# Patient Record
Sex: FEMALE | Race: WHITE
[De-identification: names, ages, dates, MRNs, and addresses within clinical notes are randomized per-mention and may not be internally consistent; named-entity substitution may affect disease eponyms.]

---

## 2017-10-06 ENCOUNTER — HOSPITAL ENCOUNTER (OUTPATIENT)
Dept: HOSPITAL 75 - LAB | Age: 57
LOS: 90 days | Discharge: HOME | End: 2018-01-04
Attending: INTERNAL MEDICINE
Payer: COMMERCIAL

## 2017-10-06 DIAGNOSIS — M34.9: Primary | ICD-10-CM

## 2017-10-06 LAB
ALBUMIN SERPL-MCNC: 4.1 GM/DL (ref 3.2–4.5)
ALP SERPL-CCNC: 140 U/L (ref 40–136)
ALT SERPL-CCNC: 100 U/L (ref 0–55)
BASOPHILS # BLD AUTO: 0 10^3/UL (ref 0–0.1)
BASOPHILS NFR BLD AUTO: 1 % (ref 0–10)
BILIRUB SERPL-MCNC: 0.5 MG/DL (ref 0.1–1)
BUN/CREAT SERPL: 14
CALCIUM SERPL-MCNC: 9.9 MG/DL (ref 8.5–10.1)
CHLORIDE SERPL-SCNC: 108 MMOL/L (ref 98–107)
CO2 SERPL-SCNC: 23 MMOL/L (ref 21–32)
CREAT SERPL-MCNC: 0.65 MG/DL (ref 0.6–1.3)
EOSINOPHIL # BLD AUTO: 0.1 10^3/UL (ref 0–0.3)
EOSINOPHIL NFR BLD AUTO: 1 % (ref 0–10)
ERYTHROCYTE [DISTWIDTH] IN BLOOD BY AUTOMATED COUNT: 13.8 % (ref 10–14.5)
GFR SERPLBLD BASED ON 1.73 SQ M-ARVRAT: > 60 ML/MIN
GLUCOSE SERPL-MCNC: 106 MG/DL (ref 70–105)
HCT VFR BLD CALC: 39 % (ref 35–52)
HGB BLD-MCNC: 12.5 G/DL (ref 11.5–16)
LYMPHOCYTES # BLD AUTO: 1.6 X 10^3 (ref 1–4)
LYMPHOCYTES NFR BLD AUTO: 36 % (ref 12–44)
MANUAL DIFFERENTIAL PERFORMED BLD QL: NO
MCH RBC QN AUTO: 29 PG (ref 25–34)
MCHC RBC AUTO-ENTMCNC: 32 G/DL (ref 32–36)
MCV RBC AUTO: 92 FL (ref 80–99)
MONOCYTES # BLD AUTO: 0.5 X 10^3 (ref 0–1)
MONOCYTES NFR BLD AUTO: 12 % (ref 0–12)
NEUTROPHILS # BLD AUTO: 2.3 X 10^3 (ref 1.8–7.8)
NEUTROPHILS NFR BLD AUTO: 51 % (ref 42–75)
PLATELET # BLD: 426 10^3/UL (ref 130–400)
PMV BLD AUTO: 8.9 FL (ref 7.4–10.4)
POTASSIUM SERPL-SCNC: 3.9 MMOL/L (ref 3.6–5)
PROT SERPL-MCNC: 8.6 GM/DL (ref 6.4–8.2)
RBC # BLD AUTO: 4.29 10^6/UL (ref 4.35–5.85)
SODIUM SERPL-SCNC: 138 MMOL/L (ref 135–145)
WBC # BLD AUTO: 4.6 10^3/UL (ref 4.3–11)

## 2017-10-06 PROCEDURE — 36415 COLL VENOUS BLD VENIPUNCTURE: CPT

## 2017-10-06 PROCEDURE — 85025 COMPLETE CBC W/AUTO DIFF WBC: CPT

## 2017-10-06 PROCEDURE — 80053 COMPREHEN METABOLIC PANEL: CPT

## 2018-02-19 ENCOUNTER — HOSPITAL ENCOUNTER (OUTPATIENT)
Dept: HOSPITAL 75 - WOUNDCARE | Age: 58
End: 2018-02-19
Attending: SURGERY
Payer: COMMERCIAL

## 2018-02-19 DIAGNOSIS — L97.312: ICD-10-CM

## 2018-02-19 DIAGNOSIS — L97.322: ICD-10-CM

## 2018-02-19 DIAGNOSIS — I70.244: ICD-10-CM

## 2018-02-19 DIAGNOSIS — M34.9: ICD-10-CM

## 2018-02-19 DIAGNOSIS — L97.422: ICD-10-CM

## 2018-02-19 DIAGNOSIS — I70.234: Primary | ICD-10-CM

## 2018-02-19 DIAGNOSIS — L97.412: ICD-10-CM

## 2018-02-19 PROCEDURE — 99215 OFFICE O/P EST HI 40 MIN: CPT

## 2018-02-28 ENCOUNTER — HOSPITAL ENCOUNTER (OUTPATIENT)
Dept: HOSPITAL 75 - WOUNDCARE | Age: 58
End: 2018-02-28
Attending: SURGERY
Payer: COMMERCIAL

## 2018-02-28 DIAGNOSIS — L97.412: ICD-10-CM

## 2018-02-28 DIAGNOSIS — L97.322: ICD-10-CM

## 2018-02-28 DIAGNOSIS — I70.244: ICD-10-CM

## 2018-02-28 DIAGNOSIS — L97.312: ICD-10-CM

## 2018-02-28 DIAGNOSIS — M34.9: ICD-10-CM

## 2018-02-28 DIAGNOSIS — I70.234: Primary | ICD-10-CM

## 2018-02-28 DIAGNOSIS — L97.422: ICD-10-CM

## 2018-02-28 PROCEDURE — 11042 DBRDMT SUBQ TIS 1ST 20SQCM/<: CPT

## 2018-02-28 PROCEDURE — 11045 DBRDMT SUBQ TISS EACH ADDL: CPT

## 2018-03-12 ENCOUNTER — HOSPITAL ENCOUNTER (OUTPATIENT)
Dept: HOSPITAL 75 - WOUNDCARE | Age: 58
End: 2018-03-12
Attending: SURGERY
Payer: COMMERCIAL

## 2018-03-12 DIAGNOSIS — L97.322: ICD-10-CM

## 2018-03-12 DIAGNOSIS — I87.333: ICD-10-CM

## 2018-03-12 DIAGNOSIS — I70.244: ICD-10-CM

## 2018-03-12 DIAGNOSIS — I70.231: Primary | ICD-10-CM

## 2018-03-12 DIAGNOSIS — M34.9: ICD-10-CM

## 2018-03-12 DIAGNOSIS — L97.312: ICD-10-CM

## 2018-03-12 PROCEDURE — 99214 OFFICE O/P EST MOD 30 MIN: CPT

## 2018-04-04 ENCOUNTER — HOSPITAL ENCOUNTER (OUTPATIENT)
Dept: HOSPITAL 75 - WOUNDCARE | Age: 58
End: 2018-04-04
Attending: SURGERY
Payer: COMMERCIAL

## 2018-04-04 ENCOUNTER — HOSPITAL ENCOUNTER (OUTPATIENT)
Dept: HOSPITAL 75 - LAB | Age: 58
End: 2018-04-04
Attending: SURGERY
Payer: COMMERCIAL

## 2018-04-04 DIAGNOSIS — I70.234: Primary | ICD-10-CM

## 2018-04-04 DIAGNOSIS — L97.312: ICD-10-CM

## 2018-04-04 DIAGNOSIS — M34.9: ICD-10-CM

## 2018-04-04 DIAGNOSIS — L97.322: ICD-10-CM

## 2018-04-04 DIAGNOSIS — I70.244: ICD-10-CM

## 2018-04-04 DIAGNOSIS — I87.333: ICD-10-CM

## 2018-04-04 LAB
ALBUMIN SERPL-MCNC: 4 GM/DL (ref 3.2–4.5)
ALP SERPL-CCNC: 98 U/L (ref 40–136)
ALT SERPL-CCNC: 72 U/L (ref 0–55)
BASOPHILS # BLD AUTO: 0 10^3/UL (ref 0–0.1)
BASOPHILS NFR BLD AUTO: 0 % (ref 0–10)
BILIRUB SERPL-MCNC: 0.4 MG/DL (ref 0.1–1)
BUN/CREAT SERPL: 12
CALCIUM SERPL-MCNC: 9.3 MG/DL (ref 8.5–10.1)
CHLORIDE SERPL-SCNC: 109 MMOL/L (ref 98–107)
CO2 SERPL-SCNC: 25 MMOL/L (ref 21–32)
CREAT SERPL-MCNC: 0.57 MG/DL (ref 0.6–1.3)
EOSINOPHIL # BLD AUTO: 0.1 10^3/UL (ref 0–0.3)
EOSINOPHIL NFR BLD AUTO: 3 % (ref 0–10)
ERYTHROCYTE [DISTWIDTH] IN BLOOD BY AUTOMATED COUNT: 13.6 % (ref 10–14.5)
GFR SERPLBLD BASED ON 1.73 SQ M-ARVRAT: > 60 ML/MIN
GLUCOSE SERPL-MCNC: 98 MG/DL (ref 70–105)
HCT VFR BLD CALC: 36 % (ref 35–52)
HGB BLD-MCNC: 11.9 G/DL (ref 11.5–16)
LYMPHOCYTES # BLD AUTO: 1.4 X 10^3 (ref 1–4)
LYMPHOCYTES NFR BLD AUTO: 25 % (ref 12–44)
MANUAL DIFFERENTIAL PERFORMED BLD QL: NO
MCH RBC QN AUTO: 31 PG (ref 25–34)
MCHC RBC AUTO-ENTMCNC: 33 G/DL (ref 32–36)
MCV RBC AUTO: 92 FL (ref 80–99)
MONOCYTES # BLD AUTO: 0.6 X 10^3 (ref 0–1)
MONOCYTES NFR BLD AUTO: 10 % (ref 0–12)
NEUTROPHILS # BLD AUTO: 3.6 X 10^3 (ref 1.8–7.8)
NEUTROPHILS NFR BLD AUTO: 62 % (ref 42–75)
PLATELET # BLD: 316 10^3/UL (ref 130–400)
PMV BLD AUTO: 9.1 FL (ref 7.4–10.4)
POTASSIUM SERPL-SCNC: 3.8 MMOL/L (ref 3.6–5)
PROT SERPL-MCNC: 8 GM/DL (ref 6.4–8.2)
RBC # BLD AUTO: 3.9 10^6/UL (ref 4.35–5.85)
SODIUM SERPL-SCNC: 140 MMOL/L (ref 135–145)
WBC # BLD AUTO: 5.7 10^3/UL (ref 4.3–11)

## 2018-04-04 PROCEDURE — 36415 COLL VENOUS BLD VENIPUNCTURE: CPT

## 2018-04-04 PROCEDURE — 80053 COMPREHEN METABOLIC PANEL: CPT

## 2018-04-04 PROCEDURE — 11045 DBRDMT SUBQ TISS EACH ADDL: CPT

## 2018-04-04 PROCEDURE — 85025 COMPLETE CBC W/AUTO DIFF WBC: CPT

## 2018-04-04 PROCEDURE — 11042 DBRDMT SUBQ TIS 1ST 20SQCM/<: CPT

## 2018-06-18 ENCOUNTER — HOSPITAL ENCOUNTER (OUTPATIENT)
Dept: HOSPITAL 75 - LAB | Age: 58
End: 2018-06-18
Attending: INTERNAL MEDICINE
Payer: COMMERCIAL

## 2018-06-18 DIAGNOSIS — R94.5: Primary | ICD-10-CM

## 2018-06-18 PROCEDURE — 82550 ASSAY OF CK (CPK): CPT

## 2018-06-18 PROCEDURE — 82085 ASSAY OF ALDOLASE: CPT

## 2018-06-18 PROCEDURE — 36415 COLL VENOUS BLD VENIPUNCTURE: CPT

## 2018-06-18 PROCEDURE — 82552 ASSAY OF CPK IN BLOOD: CPT

## 2018-09-10 ENCOUNTER — HOSPITAL ENCOUNTER (OUTPATIENT)
Dept: HOSPITAL 75 - CARD | Age: 58
End: 2018-09-10
Attending: FAMILY MEDICINE
Payer: COMMERCIAL

## 2018-09-10 DIAGNOSIS — M34.9: Primary | ICD-10-CM

## 2018-09-10 PROCEDURE — 93306 TTE W/DOPPLER COMPLETE: CPT

## 2018-09-10 PROCEDURE — 94060 EVALUATION OF WHEEZING: CPT

## 2018-09-10 PROCEDURE — 94726 PLETHYSMOGRAPHY LUNG VOLUMES: CPT

## 2018-09-10 PROCEDURE — 94729 DIFFUSING CAPACITY: CPT

## 2018-11-27 ENCOUNTER — HOSPITAL ENCOUNTER (OUTPATIENT)
Dept: HOSPITAL 75 - RAD | Age: 58
End: 2018-11-27
Attending: NURSE PRACTITIONER
Payer: COMMERCIAL

## 2018-11-27 DIAGNOSIS — J98.4: Primary | ICD-10-CM

## 2018-11-27 DIAGNOSIS — R94.2: ICD-10-CM

## 2018-11-27 DIAGNOSIS — M34.9: ICD-10-CM

## 2018-11-27 DIAGNOSIS — J98.11: ICD-10-CM

## 2018-11-27 LAB
ALBUMIN SERPL-MCNC: 3.8 GM/DL (ref 3.2–4.5)
ALP SERPL-CCNC: 118 U/L (ref 40–136)
ALT SERPL-CCNC: 36 U/L (ref 0–55)
BASOPHILS # BLD AUTO: 0 10^3/UL (ref 0–0.1)
BASOPHILS NFR BLD AUTO: 1 % (ref 0–10)
BILIRUB SERPL-MCNC: 0.3 MG/DL (ref 0.1–1)
BUN/CREAT SERPL: 21
CALCIUM SERPL-MCNC: 9.7 MG/DL (ref 8.5–10.1)
CHLORIDE SERPL-SCNC: 109 MMOL/L (ref 98–107)
CO2 SERPL-SCNC: 20 MMOL/L (ref 21–32)
CREAT SERPL-MCNC: 0.58 MG/DL (ref 0.6–1.3)
EOSINOPHIL # BLD AUTO: 0.3 10^3/UL (ref 0–0.3)
EOSINOPHIL NFR BLD AUTO: 6 % (ref 0–10)
ERYTHROCYTE [DISTWIDTH] IN BLOOD BY AUTOMATED COUNT: 15.3 % (ref 10–14.5)
GFR SERPLBLD BASED ON 1.73 SQ M-ARVRAT: > 60 ML/MIN
GLUCOSE SERPL-MCNC: 97 MG/DL (ref 70–105)
HCT VFR BLD CALC: 34 % (ref 35–52)
HGB BLD-MCNC: 10.6 G/DL (ref 11.5–16)
LYMPHOCYTES # BLD AUTO: 1.4 X 10^3 (ref 1–4)
LYMPHOCYTES NFR BLD AUTO: 31 % (ref 12–44)
MANUAL DIFFERENTIAL PERFORMED BLD QL: NO
MCH RBC QN AUTO: 28 PG (ref 25–34)
MCHC RBC AUTO-ENTMCNC: 31 G/DL (ref 32–36)
MCV RBC AUTO: 92 FL (ref 80–99)
MONOCYTES # BLD AUTO: 0.4 X 10^3 (ref 0–1)
MONOCYTES NFR BLD AUTO: 10 % (ref 0–12)
NEUTROPHILS # BLD AUTO: 2.3 X 10^3 (ref 1.8–7.8)
NEUTROPHILS NFR BLD AUTO: 52 % (ref 42–75)
PLATELET # BLD: 415 10^3/UL (ref 130–400)
PMV BLD AUTO: 8.9 FL (ref 7.4–10.4)
POTASSIUM SERPL-SCNC: 3.8 MMOL/L (ref 3.6–5)
PROT SERPL-MCNC: 8.2 GM/DL (ref 6.4–8.2)
SODIUM SERPL-SCNC: 139 MMOL/L (ref 135–145)
WBC # BLD AUTO: 4.3 10^3/UL (ref 4.3–11)

## 2018-11-27 PROCEDURE — 71275 CT ANGIOGRAPHY CHEST: CPT

## 2018-12-14 ENCOUNTER — HOSPITAL ENCOUNTER (OUTPATIENT)
Dept: HOSPITAL 75 - RAD | Age: 58
End: 2018-12-14
Attending: INTERNAL MEDICINE
Payer: COMMERCIAL

## 2018-12-14 DIAGNOSIS — M19.031: ICD-10-CM

## 2018-12-14 DIAGNOSIS — M19.032: ICD-10-CM

## 2018-12-14 DIAGNOSIS — M34.9: Primary | ICD-10-CM

## 2018-12-14 PROCEDURE — 77075 RADEX OSSEOUS SURVEY COMPL: CPT

## 2018-12-14 NOTE — DIAGNOSTIC IMAGING REPORT
EXAMINATION: Bone survey.



INDICATION: Scleroderma.



Multiple images of the axial and appendicular skeleton were

obtained.



FINDINGS: There is no fracture or acute bony abnormality

identified. There is no lytic or blastic lesion identified

either. However, there is severe degenerative disease involving

both wrists and carpal bones. The right wrist is the more

severely affected.



The soft tissues are unremarkable.



IMPRESSION:

1. There is no evidence for an acute bony abnormality. There is

no lytic or blastic lesion identified either.

2. There is severe degenerative disease involving the wrist

joints and the carpal bones.



Dictated by: 



  Dictated on workstation # [id number]

## 2019-01-10 LAB
BASOPHILS # BLD AUTO: 0 10^3/UL (ref 0–0.1)
BASOPHILS NFR BLD AUTO: 1 % (ref 0–10)
BASOPHILS NFR BLD MANUAL: 0 %
EOSINOPHIL # BLD AUTO: 0.2 10^3/UL (ref 0–0.3)
EOSINOPHIL NFR BLD AUTO: 5 % (ref 0–10)
EOSINOPHIL NFR BLD MANUAL: 5 %
ERYTHROCYTE [DISTWIDTH] IN BLOOD BY AUTOMATED COUNT: 14.4 % (ref 10–14.5)
HCT VFR BLD CALC: 39 % (ref 35–52)
HGB BLD-MCNC: 12.4 G/DL (ref 11.5–16)
LYMPHOCYTES # BLD AUTO: 1.3 X 10^3 (ref 1–4)
LYMPHOCYTES NFR BLD AUTO: 34 % (ref 12–44)
MCH RBC QN AUTO: 29 PG (ref 25–34)
MCHC RBC AUTO-ENTMCNC: 32 G/DL (ref 32–36)
MCV RBC AUTO: 90 FL (ref 80–99)
MONOCYTES # BLD AUTO: 0.4 X 10^3 (ref 0–1)
MONOCYTES NFR BLD AUTO: 11 % (ref 0–12)
MONOCYTES NFR BLD: 12 %
NEUTROPHILS # BLD AUTO: 2 X 10^3 (ref 1.8–7.8)
NEUTROPHILS NFR BLD AUTO: 50 % (ref 42–75)
NEUTS BAND NFR BLD MANUAL: 46 %
NEUTS BAND NFR BLD: 5 %
PLATELET # BLD: 413 10^3/UL (ref 130–400)
PMV BLD AUTO: 9 FL (ref 7.4–10.4)
RBC MORPH BLD: NORMAL
RETICS #: 39 10E9/L (ref 24–90)
RETICS/RBC NFR: 0.89 % (ref 0.5–2.4)
VARIANT LYMPHS NFR BLD MANUAL: 32 %
WBC # BLD AUTO: 3.9 10^3/UL (ref 4.3–11)

## 2019-01-24 ENCOUNTER — HOSPITAL ENCOUNTER (OUTPATIENT)
Dept: HOSPITAL 75 - ONC | Age: 59
LOS: 32 days | Discharge: HOME | End: 2019-02-25
Attending: INTERNAL MEDICINE
Payer: COMMERCIAL

## 2019-01-24 DIAGNOSIS — M34.9: ICD-10-CM

## 2019-01-24 DIAGNOSIS — J98.4: ICD-10-CM

## 2019-01-24 DIAGNOSIS — D47.2: Primary | ICD-10-CM

## 2019-01-24 DIAGNOSIS — I10: ICD-10-CM

## 2019-01-24 DIAGNOSIS — D64.9: ICD-10-CM

## 2019-01-24 DIAGNOSIS — Z79.899: ICD-10-CM

## 2019-01-24 PROCEDURE — 85045 AUTOMATED RETICULOCYTE COUNT: CPT

## 2019-01-24 PROCEDURE — 84155 ASSAY OF PROTEIN SERUM: CPT

## 2019-01-24 PROCEDURE — 82570 ASSAY OF URINE CREATININE: CPT

## 2019-01-24 PROCEDURE — 82746 ASSAY OF FOLIC ACID SERUM: CPT

## 2019-01-24 PROCEDURE — 82728 ASSAY OF FERRITIN: CPT

## 2019-01-24 PROCEDURE — 99213 OFFICE O/P EST LOW 20 MIN: CPT

## 2019-01-24 PROCEDURE — 86335 IMMUNFIX E-PHORSIS/URINE/CSF: CPT

## 2019-01-24 PROCEDURE — 82232 ASSAY OF BETA-2 PROTEIN: CPT

## 2019-01-24 PROCEDURE — 88237 TISSUE CULTURE BONE MARROW: CPT

## 2019-01-24 PROCEDURE — 36415 COLL VENOUS BLD VENIPUNCTURE: CPT

## 2019-01-24 PROCEDURE — 80053 COMPREHEN METABOLIC PANEL: CPT

## 2019-01-24 PROCEDURE — 85007 BL SMEAR W/DIFF WBC COUNT: CPT

## 2019-01-24 PROCEDURE — 88264 CHROMOSOME ANALYSIS 20-25: CPT

## 2019-01-24 PROCEDURE — 84156 ASSAY OF PROTEIN URINE: CPT

## 2019-01-24 PROCEDURE — 38221 DX BONE MARROW BIOPSIES: CPT

## 2019-01-24 PROCEDURE — 86334 IMMUNOFIX E-PHORESIS SERUM: CPT

## 2019-01-24 PROCEDURE — 82784 ASSAY IGA/IGD/IGG/IGM EACH: CPT

## 2019-01-24 PROCEDURE — 85025 COMPLETE CBC W/AUTO DIFF WBC: CPT

## 2019-01-24 PROCEDURE — 83540 ASSAY OF IRON: CPT

## 2019-01-24 PROCEDURE — 84165 PROTEIN E-PHORESIS SERUM: CPT

## 2019-01-24 PROCEDURE — 83883 ASSAY NEPHELOMETRY NOT SPEC: CPT

## 2019-01-24 PROCEDURE — 85027 COMPLETE CBC AUTOMATED: CPT

## 2019-01-24 PROCEDURE — 84166 PROTEIN E-PHORESIS/URINE/CSF: CPT

## 2019-01-24 PROCEDURE — 82607 VITAMIN B-12: CPT

## 2019-03-12 ENCOUNTER — HOSPITAL ENCOUNTER (OUTPATIENT)
Dept: HOSPITAL 75 - LAB | Age: 59
End: 2019-03-12
Attending: INTERNAL MEDICINE
Payer: COMMERCIAL

## 2019-03-12 DIAGNOSIS — Z79.899: ICD-10-CM

## 2019-03-12 DIAGNOSIS — M34.9: Primary | ICD-10-CM

## 2019-03-12 LAB
ALBUMIN SERPL-MCNC: 3.9 GM/DL (ref 3.2–4.5)
ALP SERPL-CCNC: 104 U/L (ref 40–136)
ALT SERPL-CCNC: 32 U/L (ref 0–55)
BASOPHILS # BLD AUTO: 0 10^3/UL (ref 0–0.1)
BASOPHILS NFR BLD AUTO: 1 % (ref 0–10)
BILIRUB SERPL-MCNC: 0.5 MG/DL (ref 0.1–1)
BUN/CREAT SERPL: 15
CALCIUM SERPL-MCNC: 9.6 MG/DL (ref 8.5–10.1)
CHLORIDE SERPL-SCNC: 106 MMOL/L (ref 98–107)
CO2 SERPL-SCNC: 24 MMOL/L (ref 21–32)
CREAT SERPL-MCNC: 0.6 MG/DL (ref 0.6–1.3)
EOSINOPHIL # BLD AUTO: 0.1 10^3/UL (ref 0–0.3)
EOSINOPHIL NFR BLD AUTO: 3 % (ref 0–10)
ERYTHROCYTE [DISTWIDTH] IN BLOOD BY AUTOMATED COUNT: 14.5 % (ref 10–14.5)
ERYTHROCYTE [SEDIMENTATION RATE] IN BLOOD: 56 MM/HR (ref 0–30)
GFR SERPLBLD BASED ON 1.73 SQ M-ARVRAT: > 60 ML/MIN
GLUCOSE SERPL-MCNC: 94 MG/DL (ref 70–105)
HCT VFR BLD CALC: 37 % (ref 35–52)
HGB BLD-MCNC: 12.1 G/DL (ref 11.5–16)
LYMPHOCYTES # BLD AUTO: 1.6 X 10^3 (ref 1–4)
LYMPHOCYTES NFR BLD AUTO: 42 % (ref 12–44)
MANUAL DIFFERENTIAL PERFORMED BLD QL: NO
MCH RBC QN AUTO: 29 PG (ref 25–34)
MCHC RBC AUTO-ENTMCNC: 33 G/DL (ref 32–36)
MCV RBC AUTO: 90 FL (ref 80–99)
MONOCYTES # BLD AUTO: 0.3 X 10^3 (ref 0–1)
MONOCYTES NFR BLD AUTO: 9 % (ref 0–12)
NEUTROPHILS # BLD AUTO: 1.8 X 10^3 (ref 1.8–7.8)
NEUTROPHILS NFR BLD AUTO: 46 % (ref 42–75)
PLATELET # BLD: 349 10^3/UL (ref 130–400)
PMV BLD AUTO: 8.8 FL (ref 7.4–10.4)
POTASSIUM SERPL-SCNC: 3.8 MMOL/L (ref 3.6–5)
PROT SERPL-MCNC: 8 GM/DL (ref 6.4–8.2)
SODIUM SERPL-SCNC: 139 MMOL/L (ref 135–145)
WBC # BLD AUTO: 3.9 10^3/UL (ref 4.3–11)

## 2019-03-12 PROCEDURE — 85652 RBC SED RATE AUTOMATED: CPT

## 2019-03-12 PROCEDURE — 86141 C-REACTIVE PROTEIN HS: CPT

## 2019-03-12 PROCEDURE — 80053 COMPREHEN METABOLIC PANEL: CPT

## 2019-03-12 PROCEDURE — 85025 COMPLETE CBC W/AUTO DIFF WBC: CPT

## 2019-03-12 PROCEDURE — 36415 COLL VENOUS BLD VENIPUNCTURE: CPT

## 2019-04-08 LAB
ALBUMIN SERPL-MCNC: 3.9 GM/DL (ref 3.2–4.5)
ALP SERPL-CCNC: 103 U/L (ref 40–136)
ALT SERPL-CCNC: 30 U/L (ref 0–55)
BASOPHILS # BLD AUTO: 0 10^3/UL (ref 0–0.1)
BASOPHILS NFR BLD AUTO: 1 % (ref 0–10)
BILIRUB SERPL-MCNC: 0.5 MG/DL (ref 0.1–1)
BUN/CREAT SERPL: 16
CALCIUM SERPL-MCNC: 9.7 MG/DL (ref 8.5–10.1)
CHLORIDE SERPL-SCNC: 107 MMOL/L (ref 98–107)
CO2 SERPL-SCNC: 23 MMOL/L (ref 21–32)
CREAT SERPL-MCNC: 0.63 MG/DL (ref 0.6–1.3)
EOSINOPHIL # BLD AUTO: 0.1 10^3/UL (ref 0–0.3)
EOSINOPHIL NFR BLD AUTO: 3 % (ref 0–10)
ERYTHROCYTE [DISTWIDTH] IN BLOOD BY AUTOMATED COUNT: 14.5 % (ref 10–14.5)
GFR SERPLBLD BASED ON 1.73 SQ M-ARVRAT: > 60 ML/MIN
GLUCOSE SERPL-MCNC: 92 MG/DL (ref 70–105)
HCT VFR BLD CALC: 37 % (ref 35–52)
HGB BLD-MCNC: 12.3 G/DL (ref 11.5–16)
LYMPHOCYTES # BLD AUTO: 1.5 X 10^3 (ref 1–4)
LYMPHOCYTES NFR BLD AUTO: 40 % (ref 12–44)
MANUAL DIFFERENTIAL PERFORMED BLD QL: NO
MCH RBC QN AUTO: 30 PG (ref 25–34)
MCHC RBC AUTO-ENTMCNC: 33 G/DL (ref 32–36)
MCV RBC AUTO: 92 FL (ref 80–99)
MONOCYTES # BLD AUTO: 0.4 X 10^3 (ref 0–1)
MONOCYTES NFR BLD AUTO: 11 % (ref 0–12)
NEUTROPHILS # BLD AUTO: 1.7 X 10^3 (ref 1.8–7.8)
NEUTROPHILS NFR BLD AUTO: 46 % (ref 42–75)
PLATELET # BLD: 329 10^3/UL (ref 130–400)
PMV BLD AUTO: 9.4 FL (ref 7.4–10.4)
POTASSIUM SERPL-SCNC: 4.2 MMOL/L (ref 3.6–5)
PROT SERPL-MCNC: 7.7 GM/DL (ref 6.4–8.2)
SODIUM SERPL-SCNC: 137 MMOL/L (ref 135–145)
WBC # BLD AUTO: 3.8 10^3/UL (ref 4.3–11)

## 2019-04-25 ENCOUNTER — HOSPITAL ENCOUNTER (OUTPATIENT)
Dept: HOSPITAL 75 - ONC | Age: 59
LOS: 73 days | Discharge: HOME | End: 2019-07-07
Attending: INTERNAL MEDICINE
Payer: COMMERCIAL

## 2019-04-25 DIAGNOSIS — D64.9: ICD-10-CM

## 2019-04-25 DIAGNOSIS — D47.2: Primary | ICD-10-CM

## 2019-04-25 DIAGNOSIS — Z79.899: ICD-10-CM

## 2019-04-25 DIAGNOSIS — I10: ICD-10-CM

## 2019-04-25 DIAGNOSIS — J98.4: ICD-10-CM

## 2019-04-25 DIAGNOSIS — M34.9: ICD-10-CM

## 2019-04-25 PROCEDURE — 83615 LACTATE (LD) (LDH) ENZYME: CPT

## 2019-04-25 PROCEDURE — 84155 ASSAY OF PROTEIN SERUM: CPT

## 2019-04-25 PROCEDURE — 82232 ASSAY OF BETA-2 PROTEIN: CPT

## 2019-04-25 PROCEDURE — 84156 ASSAY OF PROTEIN URINE: CPT

## 2019-04-25 PROCEDURE — 83883 ASSAY NEPHELOMETRY NOT SPEC: CPT

## 2019-04-25 PROCEDURE — 82784 ASSAY IGA/IGD/IGG/IGM EACH: CPT

## 2019-04-25 PROCEDURE — 85025 COMPLETE CBC W/AUTO DIFF WBC: CPT

## 2019-04-25 PROCEDURE — 80053 COMPREHEN METABOLIC PANEL: CPT

## 2019-04-25 PROCEDURE — 84165 PROTEIN E-PHORESIS SERUM: CPT

## 2019-04-25 PROCEDURE — 36415 COLL VENOUS BLD VENIPUNCTURE: CPT

## 2019-04-25 PROCEDURE — 84166 PROTEIN E-PHORESIS/URINE/CSF: CPT

## 2019-04-25 PROCEDURE — 82570 ASSAY OF URINE CREATININE: CPT

## 2019-04-25 PROCEDURE — 99213 OFFICE O/P EST LOW 20 MIN: CPT

## 2019-06-05 ENCOUNTER — HOSPITAL ENCOUNTER (OUTPATIENT)
Dept: HOSPITAL 75 - LAB | Age: 59
End: 2019-06-05
Attending: INTERNAL MEDICINE
Payer: COMMERCIAL

## 2019-06-05 DIAGNOSIS — Z79.899: ICD-10-CM

## 2019-06-05 DIAGNOSIS — Z51.81: Primary | ICD-10-CM

## 2019-06-05 PROCEDURE — 36415 COLL VENOUS BLD VENIPUNCTURE: CPT

## 2019-09-01 ENCOUNTER — HOSPITAL ENCOUNTER (OUTPATIENT)
Dept: HOSPITAL 75 - LAB | Age: 59
End: 2019-09-01
Attending: INTERNAL MEDICINE
Payer: COMMERCIAL

## 2019-09-01 DIAGNOSIS — Z79.899: ICD-10-CM

## 2019-09-01 DIAGNOSIS — M34.9: Primary | ICD-10-CM

## 2019-09-01 LAB
ALBUMIN SERPL-MCNC: 3.8 GM/DL (ref 3.2–4.5)
ALP SERPL-CCNC: 131 U/L (ref 40–136)
ALT SERPL-CCNC: 24 U/L (ref 0–55)
BASOPHILS # BLD AUTO: 0 10^3/UL (ref 0–0.1)
BASOPHILS NFR BLD AUTO: 1 % (ref 0–10)
BILIRUB SERPL-MCNC: 0.3 MG/DL (ref 0.1–1)
BUN/CREAT SERPL: 39
CALCIUM SERPL-MCNC: 9.3 MG/DL (ref 8.5–10.1)
CHLORIDE SERPL-SCNC: 109 MMOL/L (ref 98–107)
CK SERPL-CCNC: 106 U/L (ref 29–168)
CO2 SERPL-SCNC: 20 MMOL/L (ref 21–32)
CREAT SERPL-MCNC: 0.59 MG/DL (ref 0.6–1.3)
EOSINOPHIL # BLD AUTO: 0.1 10^3/UL (ref 0–0.3)
EOSINOPHIL NFR BLD AUTO: 3 % (ref 0–10)
ERYTHROCYTE [DISTWIDTH] IN BLOOD BY AUTOMATED COUNT: 15.5 % (ref 10–14.5)
ERYTHROCYTE [SEDIMENTATION RATE] IN BLOOD: 73 MM/HR (ref 0–30)
GFR SERPLBLD BASED ON 1.73 SQ M-ARVRAT: > 60 ML/MIN
GLUCOSE SERPL-MCNC: 99 MG/DL (ref 70–105)
HCT VFR BLD CALC: 36 % (ref 35–52)
HGB BLD-MCNC: 11.2 G/DL (ref 11.5–16)
LYMPHOCYTES # BLD AUTO: 1.6 X 10^3 (ref 1–4)
LYMPHOCYTES NFR BLD AUTO: 46 % (ref 12–44)
MANUAL DIFFERENTIAL PERFORMED BLD QL: NO
MCH RBC QN AUTO: 28 PG (ref 25–34)
MCHC RBC AUTO-ENTMCNC: 32 G/DL (ref 32–36)
MCV RBC AUTO: 89 FL (ref 80–99)
MONOCYTES # BLD AUTO: 0.4 X 10^3 (ref 0–1)
MONOCYTES NFR BLD AUTO: 11 % (ref 0–12)
NEUTROPHILS # BLD AUTO: 1.4 X 10^3 (ref 1.8–7.8)
NEUTROPHILS NFR BLD AUTO: 39 % (ref 42–75)
PLATELET # BLD: 403 10^3/UL (ref 130–400)
PMV BLD AUTO: 9 FL (ref 7.4–10.4)
POTASSIUM SERPL-SCNC: 4 MMOL/L (ref 3.6–5)
PROT SERPL-MCNC: 8.8 GM/DL (ref 6.4–8.2)
SODIUM SERPL-SCNC: 136 MMOL/L (ref 135–145)
WBC # BLD AUTO: 3.5 10^3/UL (ref 4.3–11)

## 2019-09-01 PROCEDURE — 82550 ASSAY OF CK (CPK): CPT

## 2019-09-01 PROCEDURE — 80053 COMPREHEN METABOLIC PANEL: CPT

## 2019-09-01 PROCEDURE — 85025 COMPLETE CBC W/AUTO DIFF WBC: CPT

## 2019-09-01 PROCEDURE — 85652 RBC SED RATE AUTOMATED: CPT

## 2019-09-01 PROCEDURE — 36415 COLL VENOUS BLD VENIPUNCTURE: CPT

## 2019-09-01 PROCEDURE — 86141 C-REACTIVE PROTEIN HS: CPT

## 2019-10-10 LAB
ALBUMIN SERPL-MCNC: 3.7 GM/DL (ref 3.2–4.5)
ALP SERPL-CCNC: 110 U/L (ref 40–136)
ALT SERPL-CCNC: 23 U/L (ref 0–55)
BASOPHILS # BLD AUTO: 0 10^3/UL (ref 0–0.1)
BASOPHILS NFR BLD AUTO: 1 % (ref 0–10)
BILIRUB SERPL-MCNC: 0.5 MG/DL (ref 0.1–1)
BUN/CREAT SERPL: 18
CALCIUM SERPL-MCNC: 9.2 MG/DL (ref 8.5–10.1)
CHLORIDE SERPL-SCNC: 107 MMOL/L (ref 98–107)
CO2 SERPL-SCNC: 24 MMOL/L (ref 21–32)
CREAT SERPL-MCNC: 0.61 MG/DL (ref 0.6–1.3)
EOSINOPHIL # BLD AUTO: 0.1 10^3/UL (ref 0–0.3)
EOSINOPHIL NFR BLD AUTO: 2 % (ref 0–10)
ERYTHROCYTE [DISTWIDTH] IN BLOOD BY AUTOMATED COUNT: 14.4 % (ref 10–14.5)
GFR SERPLBLD BASED ON 1.73 SQ M-ARVRAT: > 60 ML/MIN
GLUCOSE SERPL-MCNC: 98 MG/DL (ref 70–105)
HCT VFR BLD CALC: 36 % (ref 35–52)
HGB BLD-MCNC: 11.6 G/DL (ref 11.5–16)
LYMPHOCYTES # BLD AUTO: 1.4 X 10^3 (ref 1–4)
LYMPHOCYTES NFR BLD AUTO: 37 % (ref 12–44)
MANUAL DIFFERENTIAL PERFORMED BLD QL: NO
MCH RBC QN AUTO: 29 PG (ref 25–34)
MCHC RBC AUTO-ENTMCNC: 32 G/DL (ref 32–36)
MCV RBC AUTO: 89 FL (ref 80–99)
MONOCYTES # BLD AUTO: 0.3 X 10^3 (ref 0–1)
MONOCYTES NFR BLD AUTO: 9 % (ref 0–12)
NEUTROPHILS # BLD AUTO: 1.9 X 10^3 (ref 1.8–7.8)
NEUTROPHILS NFR BLD AUTO: 51 % (ref 42–75)
PLATELET # BLD: 336 10^3/UL (ref 130–400)
PMV BLD AUTO: 8.9 FL (ref 7.4–10.4)
POTASSIUM SERPL-SCNC: 3.8 MMOL/L (ref 3.6–5)
PROT SERPL-MCNC: 8.1 GM/DL (ref 6.4–8.2)
SODIUM SERPL-SCNC: 138 MMOL/L (ref 135–145)
WBC # BLD AUTO: 3.8 10^3/UL (ref 4.3–11)

## 2019-10-11 LAB
PROT 24H UR-MCNC: 8 MG/DL (ref 6–12)
PROT 24H UR-MRATE: 144 MG/24H (ref 0–149)

## 2019-10-24 ENCOUNTER — HOSPITAL ENCOUNTER (OUTPATIENT)
Dept: HOSPITAL 75 - ONC | Age: 59
LOS: 76 days | Discharge: HOME | End: 2020-01-08
Attending: INTERNAL MEDICINE
Payer: COMMERCIAL

## 2019-10-24 DIAGNOSIS — D47.2: Primary | ICD-10-CM

## 2019-10-24 DIAGNOSIS — J98.4: ICD-10-CM

## 2019-10-24 DIAGNOSIS — I10: ICD-10-CM

## 2019-10-24 DIAGNOSIS — M34.9: ICD-10-CM

## 2019-10-24 DIAGNOSIS — D64.9: ICD-10-CM

## 2019-10-24 DIAGNOSIS — Z79.899: ICD-10-CM

## 2019-10-24 PROCEDURE — 90471 IMMUNIZATION ADMIN: CPT

## 2019-10-24 PROCEDURE — 83615 LACTATE (LD) (LDH) ENZYME: CPT

## 2019-10-24 PROCEDURE — 99213 OFFICE O/P EST LOW 20 MIN: CPT

## 2019-10-24 PROCEDURE — 85025 COMPLETE CBC W/AUTO DIFF WBC: CPT

## 2019-10-24 PROCEDURE — 84156 ASSAY OF PROTEIN URINE: CPT

## 2019-10-24 PROCEDURE — 84155 ASSAY OF PROTEIN SERUM: CPT

## 2019-10-24 PROCEDURE — 84165 PROTEIN E-PHORESIS SERUM: CPT

## 2019-10-24 PROCEDURE — 83883 ASSAY NEPHELOMETRY NOT SPEC: CPT

## 2019-10-24 PROCEDURE — 82784 ASSAY IGA/IGD/IGG/IGM EACH: CPT

## 2019-10-24 PROCEDURE — 80053 COMPREHEN METABOLIC PANEL: CPT

## 2020-03-18 ENCOUNTER — HOSPITAL ENCOUNTER (INPATIENT)
Dept: HOSPITAL 75 - ER | Age: 60
LOS: 9 days | Discharge: NURSING, REHAB OR LONG-TERM CARE | DRG: 871 | End: 2020-03-27
Attending: FAMILY MEDICINE | Admitting: FAMILY MEDICINE
Payer: COMMERCIAL

## 2020-03-18 VITALS — SYSTOLIC BLOOD PRESSURE: 92 MMHG | DIASTOLIC BLOOD PRESSURE: 60 MMHG

## 2020-03-18 VITALS — DIASTOLIC BLOOD PRESSURE: 52 MMHG | SYSTOLIC BLOOD PRESSURE: 90 MMHG

## 2020-03-18 VITALS — DIASTOLIC BLOOD PRESSURE: 61 MMHG | SYSTOLIC BLOOD PRESSURE: 115 MMHG

## 2020-03-18 VITALS — SYSTOLIC BLOOD PRESSURE: 90 MMHG | DIASTOLIC BLOOD PRESSURE: 53 MMHG

## 2020-03-18 VITALS — SYSTOLIC BLOOD PRESSURE: 97 MMHG | DIASTOLIC BLOOD PRESSURE: 57 MMHG

## 2020-03-18 VITALS — DIASTOLIC BLOOD PRESSURE: 52 MMHG | SYSTOLIC BLOOD PRESSURE: 84 MMHG

## 2020-03-18 VITALS — DIASTOLIC BLOOD PRESSURE: 71 MMHG | SYSTOLIC BLOOD PRESSURE: 102 MMHG

## 2020-03-18 VITALS — HEIGHT: 67.99 IN | WEIGHT: 178.35 LBS | BODY MASS INDEX: 27.03 KG/M2

## 2020-03-18 VITALS — DIASTOLIC BLOOD PRESSURE: 58 MMHG | SYSTOLIC BLOOD PRESSURE: 98 MMHG

## 2020-03-18 VITALS — DIASTOLIC BLOOD PRESSURE: 68 MMHG | SYSTOLIC BLOOD PRESSURE: 101 MMHG

## 2020-03-18 VITALS — DIASTOLIC BLOOD PRESSURE: 60 MMHG | SYSTOLIC BLOOD PRESSURE: 92 MMHG

## 2020-03-18 VITALS — SYSTOLIC BLOOD PRESSURE: 89 MMHG | DIASTOLIC BLOOD PRESSURE: 58 MMHG

## 2020-03-18 DIAGNOSIS — M79.10: ICD-10-CM

## 2020-03-18 DIAGNOSIS — Z66: ICD-10-CM

## 2020-03-18 DIAGNOSIS — R10.9: ICD-10-CM

## 2020-03-18 DIAGNOSIS — R44.3: ICD-10-CM

## 2020-03-18 DIAGNOSIS — R53.1: ICD-10-CM

## 2020-03-18 DIAGNOSIS — A41.01: Primary | ICD-10-CM

## 2020-03-18 DIAGNOSIS — R16.1: ICD-10-CM

## 2020-03-18 DIAGNOSIS — D64.9: ICD-10-CM

## 2020-03-18 DIAGNOSIS — E87.2: ICD-10-CM

## 2020-03-18 DIAGNOSIS — L95.9: ICD-10-CM

## 2020-03-18 DIAGNOSIS — K21.9: ICD-10-CM

## 2020-03-18 DIAGNOSIS — R82.79: ICD-10-CM

## 2020-03-18 DIAGNOSIS — E87.6: ICD-10-CM

## 2020-03-18 DIAGNOSIS — E83.42: ICD-10-CM

## 2020-03-18 DIAGNOSIS — E86.0: ICD-10-CM

## 2020-03-18 DIAGNOSIS — I66.8: ICD-10-CM

## 2020-03-18 DIAGNOSIS — R51: ICD-10-CM

## 2020-03-18 DIAGNOSIS — I10: ICD-10-CM

## 2020-03-18 DIAGNOSIS — I63.89: ICD-10-CM

## 2020-03-18 DIAGNOSIS — G93.41: ICD-10-CM

## 2020-03-18 DIAGNOSIS — K80.20: ICD-10-CM

## 2020-03-18 DIAGNOSIS — R65.21: ICD-10-CM

## 2020-03-18 DIAGNOSIS — M34.9: ICD-10-CM

## 2020-03-18 DIAGNOSIS — J10.1: ICD-10-CM

## 2020-03-18 DIAGNOSIS — N17.0: ICD-10-CM

## 2020-03-18 LAB
ALBUMIN SERPL-MCNC: 2.4 GM/DL (ref 3.2–4.5)
ALBUMIN SERPL-MCNC: 3 GM/DL (ref 3.2–4.5)
ALP SERPL-CCNC: 268 U/L (ref 40–136)
ALP SERPL-CCNC: 328 U/L (ref 40–136)
ALT SERPL-CCNC: 62 U/L (ref 0–55)
ALT SERPL-CCNC: 74 U/L (ref 0–55)
AMORPH SED URNS QL MICRO: (no result) /LPF
ANISOCYTOSIS BLD QL SMEAR: SLIGHT
APTT BLD: 34 SEC (ref 24–35)
APTT PPP: YELLOW S
BACTERIA #/AREA URNS HPF: (no result) /HPF
BASOPHILS # BLD AUTO: 0 10^3/UL (ref 0–0.1)
BASOPHILS # BLD AUTO: 0.1 10^3/UL (ref 0–0.1)
BASOPHILS NFR BLD AUTO: 0 % (ref 0–10)
BASOPHILS NFR BLD AUTO: 0 % (ref 0–10)
BASOPHILS NFR BLD MANUAL: 0 %
BILIRUB SERPL-MCNC: 1.9 MG/DL (ref 0.1–1)
BILIRUB SERPL-MCNC: 2.3 MG/DL (ref 0.1–1)
BILIRUB UR QL STRIP: (no result)
BLD SMEAR INTERP: YES
BUN/CREAT SERPL: 17
BUN/CREAT SERPL: 19
BURR CELLS BLD QL SMEAR: SLIGHT
CALCIUM SERPL-MCNC: 9.4 MG/DL (ref 8.5–10.1)
CALCIUM SERPL-MCNC: 9.6 MG/DL (ref 8.5–10.1)
CHLORIDE SERPL-SCNC: 100 MMOL/L (ref 98–107)
CHLORIDE SERPL-SCNC: 97 MMOL/L (ref 98–107)
CO2 SERPL-SCNC: 15 MMOL/L (ref 21–32)
CO2 SERPL-SCNC: 16 MMOL/L (ref 21–32)
CREAT SERPL-MCNC: 2.54 MG/DL (ref 0.6–1.3)
CREAT SERPL-MCNC: 2.95 MG/DL (ref 0.6–1.3)
EOSINOPHIL # BLD AUTO: 0 10^3/UL (ref 0–0.3)
EOSINOPHIL # BLD AUTO: 0 10^3/UL (ref 0–0.3)
EOSINOPHIL NFR BLD AUTO: 0 % (ref 0–10)
EOSINOPHIL NFR BLD AUTO: 0 % (ref 0–10)
EOSINOPHIL NFR BLD MANUAL: 0 %
ERYTHROCYTE [DISTWIDTH] IN BLOOD BY AUTOMATED COUNT: 17.3 % (ref 10–14.5)
ERYTHROCYTE [DISTWIDTH] IN BLOOD BY AUTOMATED COUNT: 17.7 % (ref 10–14.5)
FIBRINOGEN PPP-MCNC: (no result) MG/DL
GFR SERPLBLD BASED ON 1.73 SQ M-ARVRAT: 16 ML/MIN
GFR SERPLBLD BASED ON 1.73 SQ M-ARVRAT: 19 ML/MIN
GLUCOSE SERPL-MCNC: 60 MG/DL (ref 70–105)
GLUCOSE SERPL-MCNC: 82 MG/DL (ref 70–105)
GLUCOSE UR STRIP-MCNC: NEGATIVE MG/DL
GRAN CASTS #/AREA URNS LPF: (no result) /LPF
HCT VFR BLD CALC: 38 % (ref 35–52)
HCT VFR BLD CALC: 39 % (ref 35–52)
HGB BLD-MCNC: 12 G/DL (ref 11.5–16)
HGB BLD-MCNC: 12.6 G/DL (ref 11.5–16)
HYALINE CASTS #/AREA URNS LPF: (no result) /LPF
INR PPP: 1.1 (ref 0.8–1.4)
KETONES UR QL STRIP: (no result)
LEUKOCYTE ESTERASE UR QL STRIP: NEGATIVE
LYMPHOCYTES # BLD AUTO: 1.6 X 10^3 (ref 1–4)
LYMPHOCYTES # BLD AUTO: 1.9 X 10^3 (ref 1–4)
LYMPHOCYTES NFR BLD AUTO: 10 % (ref 12–44)
LYMPHOCYTES NFR BLD AUTO: 10 % (ref 12–44)
MAGNESIUM SERPL-MCNC: 1.9 MG/DL (ref 1.6–2.4)
MANUAL DIFFERENTIAL PERFORMED BLD QL: NO
MANUAL DIFFERENTIAL PERFORMED BLD QL: YES
MCH RBC QN AUTO: 28 PG (ref 25–34)
MCH RBC QN AUTO: 29 PG (ref 25–34)
MCHC RBC AUTO-ENTMCNC: 31 G/DL (ref 32–36)
MCHC RBC AUTO-ENTMCNC: 33 G/DL (ref 32–36)
MCV RBC AUTO: 87 FL (ref 80–99)
MCV RBC AUTO: 92 FL (ref 80–99)
MONOCYTES # BLD AUTO: 0.8 X 10^3 (ref 0–1)
MONOCYTES # BLD AUTO: 0.9 X 10^3 (ref 0–1)
MONOCYTES NFR BLD AUTO: 5 % (ref 0–12)
MONOCYTES NFR BLD AUTO: 5 % (ref 0–12)
MONOCYTES NFR BLD: 5 %
NEUTROPHILS # BLD AUTO: 12.8 X 10^3 (ref 1.8–7.8)
NEUTROPHILS # BLD AUTO: 15.9 X 10^3 (ref 1.8–7.8)
NEUTROPHILS NFR BLD AUTO: 84 % (ref 42–75)
NEUTROPHILS NFR BLD AUTO: 85 % (ref 42–75)
NEUTS BAND NFR BLD MANUAL: 69 %
NEUTS BAND NFR BLD: 18 %
NITRITE UR QL STRIP: NEGATIVE
PH UR STRIP: 5 [PH] (ref 5–9)
PHOSPHATE SERPL-MCNC: 2.9 MG/DL (ref 2.3–4.7)
PLATELET # BLD: 164 10^3/UL (ref 130–400)
PLATELET # BLD: 223 10^3/UL (ref 130–400)
PMV BLD AUTO: 10.7 FL (ref 7.4–10.4)
PMV BLD AUTO: 10.9 FL (ref 7.4–10.4)
POTASSIUM SERPL-SCNC: 3.2 MMOL/L (ref 3.6–5)
POTASSIUM SERPL-SCNC: 3.3 MMOL/L (ref 3.6–5)
PROT SERPL-MCNC: 6.6 GM/DL (ref 6.4–8.2)
PROT SERPL-MCNC: 7.7 GM/DL (ref 6.4–8.2)
PROT UR QL STRIP: (no result)
PROTHROMBIN TIME: 14.1 SEC (ref 12.2–14.7)
RBC #/AREA URNS HPF: (no result) /HPF
SODIUM SERPL-SCNC: 132 MMOL/L (ref 135–145)
SODIUM SERPL-SCNC: 135 MMOL/L (ref 135–145)
SP GR UR STRIP: 1.02 (ref 1.02–1.02)
TOXIC GRANULES BLD QL SMEAR: (no result)
VARIANT LYMPHS NFR BLD MANUAL: 1 %
VARIANT LYMPHS NFR BLD MANUAL: 7 %
WBC # BLD AUTO: 15.3 10^3/UL (ref 4.3–11)
WBC # BLD AUTO: 18.8 10^3/UL (ref 4.3–11)
WBC #/AREA URNS HPF: (no result) /HPF

## 2020-03-18 PROCEDURE — 94760 N-INVAS EAR/PLS OXIMETRY 1: CPT

## 2020-03-18 PROCEDURE — 85610 PROTHROMBIN TIME: CPT

## 2020-03-18 PROCEDURE — 87040 BLOOD CULTURE FOR BACTERIA: CPT

## 2020-03-18 PROCEDURE — 94640 AIRWAY INHALATION TREATMENT: CPT

## 2020-03-18 PROCEDURE — 71045 X-RAY EXAM CHEST 1 VIEW: CPT

## 2020-03-18 PROCEDURE — 70498 CT ANGIOGRAPHY NECK: CPT

## 2020-03-18 PROCEDURE — 81000 URINALYSIS NONAUTO W/SCOPE: CPT

## 2020-03-18 PROCEDURE — 70551 MRI BRAIN STEM W/O DYE: CPT

## 2020-03-18 PROCEDURE — 87081 CULTURE SCREEN ONLY: CPT

## 2020-03-18 PROCEDURE — 82140 ASSAY OF AMMONIA: CPT

## 2020-03-18 PROCEDURE — 51702 INSERT TEMP BLADDER CATH: CPT

## 2020-03-18 PROCEDURE — 82150 ASSAY OF AMYLASE: CPT

## 2020-03-18 PROCEDURE — 96361 HYDRATE IV INFUSION ADD-ON: CPT

## 2020-03-18 PROCEDURE — 83605 ASSAY OF LACTIC ACID: CPT

## 2020-03-18 PROCEDURE — 80053 COMPREHEN METABOLIC PANEL: CPT

## 2020-03-18 PROCEDURE — 85025 COMPLETE CBC W/AUTO DIFF WBC: CPT

## 2020-03-18 PROCEDURE — 87186 SC STD MICRODIL/AGAR DIL: CPT

## 2020-03-18 PROCEDURE — 87185 SC STD ENZYME DETCJ PER NZM: CPT

## 2020-03-18 PROCEDURE — 70450 CT HEAD/BRAIN W/O DYE: CPT

## 2020-03-18 PROCEDURE — 93880 EXTRACRANIAL BILAT STUDY: CPT

## 2020-03-18 PROCEDURE — 93325 DOPPLER ECHO COLOR FLOW MAPG: CPT

## 2020-03-18 PROCEDURE — 87088 URINE BACTERIA CULTURE: CPT

## 2020-03-18 PROCEDURE — 85007 BL SMEAR W/DIFF WBC COUNT: CPT

## 2020-03-18 PROCEDURE — 96365 THER/PROPH/DIAG IV INF INIT: CPT

## 2020-03-18 PROCEDURE — 93970 EXTREMITY STUDY: CPT

## 2020-03-18 PROCEDURE — 87449 NOS EACH ORGANISM AG IA: CPT

## 2020-03-18 PROCEDURE — 85730 THROMBOPLASTIN TIME PARTIAL: CPT

## 2020-03-18 PROCEDURE — 87184 SC STD DISK METHOD PER PLATE: CPT

## 2020-03-18 PROCEDURE — 87899 AGENT NOS ASSAY W/OPTIC: CPT

## 2020-03-18 PROCEDURE — 80048 BASIC METABOLIC PNL TOTAL CA: CPT

## 2020-03-18 PROCEDURE — 96375 TX/PRO/DX INJ NEW DRUG ADDON: CPT

## 2020-03-18 PROCEDURE — 87205 SMEAR GRAM STAIN: CPT

## 2020-03-18 PROCEDURE — 82962 GLUCOSE BLOOD TEST: CPT

## 2020-03-18 PROCEDURE — 80076 HEPATIC FUNCTION PANEL: CPT

## 2020-03-18 PROCEDURE — 76937 US GUIDE VASCULAR ACCESS: CPT

## 2020-03-18 PROCEDURE — 36569 INSJ PICC 5 YR+ W/O IMAGING: CPT

## 2020-03-18 PROCEDURE — 84100 ASSAY OF PHOSPHORUS: CPT

## 2020-03-18 PROCEDURE — 87070 CULTURE OTHR SPECIMN AEROBIC: CPT

## 2020-03-18 PROCEDURE — 36415 COLL VENOUS BLD VENIPUNCTURE: CPT

## 2020-03-18 PROCEDURE — 02HV33Z INSERTION OF INFUSION DEVICE INTO SUPERIOR VENA CAVA, PERCUTANEOUS APPROACH: ICD-10-PCS | Performed by: SURGERY

## 2020-03-18 PROCEDURE — 83735 ASSAY OF MAGNESIUM: CPT

## 2020-03-18 PROCEDURE — 87804 INFLUENZA ASSAY W/OPTIC: CPT

## 2020-03-18 PROCEDURE — 85027 COMPLETE CBC AUTOMATED: CPT

## 2020-03-18 PROCEDURE — 87077 CULTURE AEROBIC IDENTIFY: CPT

## 2020-03-18 PROCEDURE — 83690 ASSAY OF LIPASE: CPT

## 2020-03-18 PROCEDURE — 74176 CT ABD & PELVIS W/O CONTRAST: CPT

## 2020-03-18 RX ADMIN — POTASSIUM CHLORIDE SCH MLS/HR: 200 INJECTION, SOLUTION INTRAVENOUS at 20:26

## 2020-03-18 RX ADMIN — SODIUM CHLORIDE SCH MLS/HR: 900 INJECTION INTRAVENOUS at 23:39

## 2020-03-18 RX ADMIN — POTASSIUM CHLORIDE SCH MLS/HR: 200 INJECTION, SOLUTION INTRAVENOUS at 19:12

## 2020-03-18 RX ADMIN — Medication SCH MLS/HR: at 23:22

## 2020-03-18 RX ADMIN — OSELTAMIVIR PHOSPHATE ONE MG: 75 CAPSULE ORAL at 10:58

## 2020-03-18 RX ADMIN — VASOPRESSIN SCH MLS/HR: 20 INJECTION INTRAVENOUS at 20:01

## 2020-03-18 RX ADMIN — IPRATROPIUM BROMIDE AND ALBUTEROL SULFATE SCH ML: .5; 3 SOLUTION RESPIRATORY (INHALATION) at 18:56

## 2020-03-18 RX ADMIN — VASOPRESSIN SCH MLS/HR: 20 INJECTION INTRAVENOUS at 14:42

## 2020-03-18 RX ADMIN — IPRATROPIUM BROMIDE AND ALBUTEROL SULFATE SCH ML: .5; 3 SOLUTION RESPIRATORY (INHALATION) at 22:30

## 2020-03-18 RX ADMIN — SODIUM CHLORIDE, SODIUM LACTATE, POTASSIUM CHLORIDE, AND CALCIUM CHLORIDE SCH MLS/HR: 600; 310; 30; 20 INJECTION, SOLUTION INTRAVENOUS at 23:39

## 2020-03-18 RX ADMIN — Medication SCH MLS/HR: at 14:42

## 2020-03-18 RX ADMIN — OSELTAMIVIR PHOSPHATE ONE MG: 75 CAPSULE ORAL at 10:39

## 2020-03-18 NOTE — DIAGNOSTIC IMAGING REPORT
Indication: Sepsis workup.



Time of exam 9:32 AM



No prior studies are available for comparison.



The heart size is normal. The pulmonary vascularity is

unremarkable. The lungs are clear. No infiltrate, effusion or

pneumothorax is detected.



Impression: No acute cardiopulmonary process is detected.



Dictated by: 



  Dictated on workstation # [id number]

## 2020-03-18 NOTE — ED GENERAL
General


Chief Complaint:  General Problems/Pain


Stated Complaint:  MULTIPLE COMPLAINTS


Nursing Triage Note:  


TO ED PER  [facility]  CALLED EARLIER AND REPORTED SHE HAD A COUGH 


FOR 6 MONTHS, HAS SCLERDERMA, ULCERS ON ANKLES, HALUCINATIONS, PATIENT  UNSURE 


WHY SHE IS HERE.  REPORTS IS ABLE TO TURN SELF IN BED. UNSURE OF PMH OF MEDS. 


PLACED ON 02 ON ADMIT SAO2 UP TO 97%


Nursing Sepsis Screen:  No Definite Risk


Source of Information:  Patient, Family


Exam Limitations:  No Limitations





History of Present Illness


Date Seen by Provider:  Mar 18, 2020


Time Seen by Provider:  09:25


Initial Comments


Here with report of cough, body aches and overall not feeling well. Also has 

abdominal pain. Does have history of scleroderma. She has been taking Aleve for 

the pain. She does arrive with a fever. Denies nausea or vomiting. Last bowel 

movement was yesterday. States that she has decreased urination.


Timing/Duration:  24 Hours, Getting Worse


Severity:  Moderate


Associated Systoms:  Fever/Chills





Allergies and Home Medications


Allergies


Coded Allergies:  


     Penicillins (Verified  Allergy, Unknown, 3/18/20)





Patient Home Medication List


Home Medication List Reviewed:  Yes





Review of Systems


Review of Systems


Constitutional:  see HPI, chills, fever


EENTM:  no symptoms reported


Respiratory:  cough; No short of breath


Cardiovascular:  No chest pain, No edema


Gastrointestinal:  abdominal pain; No nausea, No vomiting


Genitourinary:  decreased output; No dysuria


Pregnant:  No


Musculoskeletal:  muscle pain, muscle stiffness


Skin:  change in color, lesions (Scleroderma. Multiple superficial wounds.)


Psychiatric/Neurological:  No Symptoms Reported


Hematologic/Lymphatic:  No Symptoms Reported





All Other Systems Reviewed


Negative Unless Noted:  Yes





Past Medical-Social-Family Hx


Past Med/Social Hx:  Reviewed Nursing Past Med/Soc Hx


Patient Social History


Alcohol Use:  Denies Use


Recreational Drug Use:  No


Smoking Status:  Never a Smoker


Recent Foreign Travel:  No


Contact w/Someone Who Travel:  No


Recent Infectious Disease Expo:  No





Past Medical History


Surgeries:  No


Cardiac:  Yes


Hypertension


Genitourinary:  No


Gastrointestinal:  Yes


Gastroesophageal Reflux


Integumentary:  Yes (SCLERODERMA)





Family Medical History


Reviewed Nursing Family Hx


No Pertinent Family Hx





Physical Exam-Suspected Sepsis


Physical Exam


Vital Signs





Vital Signs - First Documented








 3/18/20





 09:20


 


Temp 37.3


 


Pulse 99


 


Resp 18


 


B/P (MAP) 99/55 (70)


 


Pulse Ox 88


 


O2 Delivery Room Air


 


O2 Flow Rate 2.00





Capillary Refill : Less Than 3 Seconds








Blood Pressure Mean:                    70








Height, Weight, BMI


Height: '"


Weight: lbs. oz. kg; 22.00 BMI


Method:


General Appearance:  No Apparent Distress, WD/WN


HEENT:  PERRL/EOMI, Pharynx Normal


Neck:  Non Tender, Supple


Respiratory:  Lungs Clear, Normal Breath Sounds


Cardiovascular:  Regular Rate, Rhythm, No Murmur


Gastrointestinal:  Non Tender, Soft


Back:  Normal Inspection, No CVA Tenderness, No Vertebral Tenderness


Extremity:  Non Tender, Other (decreased range of motion due to scleroderma 

scarring and contractures to both upper and lower extremities)


Neurologic/Psychiatric:  Alert, Oriented x3


Skin:  warm/dry, other (scleroderma skin changes noted to face and extremities)





Focused Exam


Lactate Level


3/18/20 09:40: Lactic Acid Level 2.07*H





Lactic Acid Level





Laboratory Tests








Test


 3/18/20


09:40


 


Lactic Acid Level


 2.07 MMOL/L


(0.50-2.00)  *H











Progress/Results/Core Measures


Suspected Sepsis


Recent Fever Within 48 Hours:  No


Infection Criteria Present:  None


New/Unexplained  Altered Menta:  No


Sepsis Screen:  No Definite Risk


SIRS


Temperature: 


Pulse: 99 


Respiratory Rate: 18


 


Laboratory Tests


3/18/20 09:40: White Blood Count 18.8H


Blood Pressure 99 /55 


Mean: 70


 


3/18/20 09:40: Lactic Acid Level 2.07*H


Laboratory Tests


3/18/20 09:40: 


Creatinine 2.95H, INR Comment 1.1, Platelet Count 223, Total Bilirubin 2.3H








Results/Orders


Lab Results





Laboratory Tests








Test


 3/18/20


09:40 3/18/20


10:49 Range/Units


 


 


White Blood Count


 18.8 H


 


 4.3-11.0


10^3/uL


 


Red Blood Count


 4.44 


 


 4.35-5.85


10^6/uL


 


Hemoglobin 12.6   11.5-16.0  G/DL


 


Hematocrit 39   35-52  %


 


Mean Corpuscular Volume 87   80-99  FL


 


Mean Corpuscular Hemoglobin 28   25-34  PG


 


Mean Corpuscular Hemoglobin


Concent 33 


 


 32-36  G/DL





 


Red Cell Distribution Width 17.3 H  10.0-14.5  %


 


Platelet Count


 223 


 


 130-400


10^3/uL


 


Mean Platelet Volume 10.9 H  7.4-10.4  FL


 


Neutrophils (%) (Auto) 85 H  42-75  %


 


Lymphocytes (%) (Auto) 10 L  12-44  %


 


Monocytes (%) (Auto) 5   0-12  %


 


Eosinophils (%) (Auto) 0   0-10  %


 


Basophils (%) (Auto) 0   0-10  %


 


Neutrophils # (Auto) 15.9 H  1.8-7.8  X 10^3


 


Lymphocytes # (Auto) 1.9   1.0-4.0  X 10^3


 


Monocytes # (Auto) 0.9   0.0-1.0  X 10^3


 


Eosinophils # (Auto)


 0.0 


 


 0.0-0.3


10^3/uL


 


Basophils # (Auto)


 0.0 


 


 0.0-0.1


10^3/uL


 


Neutrophils % (Manual) 69    %


 


Lymphocytes % (Manual) 7    %


 


Monocytes % (Manual) 5    %


 


Eosinophils % (Manual) 0    %


 


Basophils % (Manual) 0    %


 


Band Neutrophils 18    %


 


Reactive Lymphocytes 1    %


 


Toxic Granulation 2+    


 


Anisocytosis SLIGHT    


 


James Cells SLIGHT    


 


Prothrombin Time 14.1   12.2-14.7  SEC


 


INR Comment 1.1   0.8-1.4  


 


Activated Partial


Thromboplast Time 34 


 


 24-35  SEC





 


Sodium Level 135   135-145  MMOL/L


 


Potassium Level 3.3 L  3.6-5.0  MMOL/L


 


Chloride Level 97 L    MMOL/L


 


Carbon Dioxide Level 16 L  21-32  MMOL/L


 


Anion Gap 22 H  5-14  MMOL/L


 


Blood Urea Nitrogen 49 H  7-18  MG/DL


 


Creatinine


 2.95 H


 


 0.60-1.30


MG/DL


 


Estimat Glomerular Filtration


Rate 16 


 


  





 


BUN/Creatinine Ratio 17    


 


Glucose Level 60 *L    MG/DL


 


Lactic Acid Level


 2.07 *H


 


 0.50-2.00


MMOL/L


 


Calcium Level 9.6   8.5-10.1  MG/DL


 


Corrected Calcium 10.4 H  8.5-10.1  MG/DL


 


Total Bilirubin 2.3 H  0.1-1.0  MG/DL


 


Aspartate Amino Transf


(AST/SGOT) 161 H


 


 5-34  U/L





 


Alanine Aminotransferase


(ALT/SGPT) 74 H


 


 0-55  U/L





 


Alkaline Phosphatase 328 H    U/L


 


Total Protein 7.7   6.4-8.2  GM/DL


 


Albumin 3.0 L  3.2-4.5  GM/DL


 


Urine Color  YELLOW   


 


Urine Clarity  CLOUDY   


 


Urine pH  5.0  5-9  


 


Urine Specific Gravity  1.025 H 1.016-1.022  


 


Urine Protein  3+ H NEGATIVE  


 


Urine Glucose (UA)  NEGATIVE  NEGATIVE  


 


Urine Ketones  1+ H NEGATIVE  


 


Urine Nitrite  NEGATIVE  NEGATIVE  


 


Urine Bilirubin  2+ H NEGATIVE  


 


Urine Urobilinogen  1.0  < = 1.0  MG/DL


 


Urine Leukocyte Esterase  NEGATIVE  NEGATIVE  


 


Urine RBC (Auto)  NEGATIVE  NEGATIVE  


 


Urine RBC  NONE   /HPF


 


Urine WBC  5-10 H  /HPF


 


Urine Squamous Epithelial


Cells 


 10-25 H


  /HPF





 


Urine Crystals  PRESENT H  /LPF


 


Urine Amorphous Sediment


 


 MOD ROGER


URATES H  /LPF





 


Urine Bacteria  FEW H  /HPF


 


Urine Casts  PRESENT   /LPF


 


Urine Hyaline Casts  2-5 H  /LPF


 


Urine Granular Casts  2-5 H  /LPF


 


Urine Mucus  MODERATE H  /LPF


 


Urine Culture Indicated


 


 CULTURE


PENDING  











Micro Results





Microbiology


3/18/20 Influenza Types A,B Antigen (ANG) - Final, Complete


          





My Orders





Orders - SHERIDAN MONTILLA MD


Cbc With Automated Diff (3/18/20 09:24)


Comprehensive Metabolic Panel (3/18/20 09:24)


Blood Culture (3/18/20 09:24)


Sputum Culture (3/18/20 09:24)


Urinalysis (3/18/20 09:24)


Urine Culture (3/18/20 09:24)


Protime With Inr (3/18/20 09:24)


Partial Thromboplastin Time (3/18/20 09:24)


Chest 1 View, Ap/Pa Only (3/18/20 09:24)


Ed Iv/Invasive Line Start (3/18/20 09:24)


Ed Iv/Invasive Line Start (3/18/20 09:24)


Vital Signs Adult Sepsis Patie Q15M (3/18/20 09:24)


O2 (3/18/20 09:24)


Remove Rings In Anticipation O (3/18/20 09:24)


Lactic Acid Analyzer (3/18/20 09:24)


Influenza A And B Antigens (3/18/20 09:46)


Lactated Ringers (Lr 1000 Ml Iv Solution (3/18/20 09:46)


Fentanyl  Injection (Sublimaze Injection (3/18/20 09:46)


Catheter(Urinary) Insert & Ass 03,15 (3/18/20 09:46)


Manual Differential (3/18/20 09:40)


Oseltamivir  75 Mg Capsule (Tamiflu  75 (3/18/20 10:30)


D5 Ns 1000 Ml Iv Solution (Dextrose 5%/0 (3/18/20 10:30)


Oseltamivir 30 Mg Capsule (Tamiflu 30 Mg (3/18/20 11:00)


Lactated Ringers (Lr 1000 Ml Iv Solution (3/18/20 11:00)


Cefepime Injection (Maxipime Injection) (3/18/20 12:00)





Medications Given in ED





Current Medications








 Medications  Dose


 Ordered  Sig/Cynthia


 Route  Start Time


 Stop Time Status Last Admin


Dose Admin


 


 Lactated Ringer's  1,000 ml @ 


 0 mls/hr  Q0M ONCE


 IV  3/18/20 11:00


 3/18/20 11:01 DC 3/18/20 11:08


1,000 MLS/HR


 


 Oseltamivir


 Phosphate  30 mg  ONCE  ONCE


 PO  3/18/20 11:00


 3/18/20 11:01 DC 3/18/20 11:08


30 MG








Vital Signs/I&O











 3/18/20 3/18/20





 09:20 09:20


 


Temp 37.3 


 


Pulse 99 


 


Resp 18 


 


B/P (MAP) 99/55 (70) 


 


Pulse Ox 88 88


 


O2 Delivery Room Air Nasal Cannula


 


O2 Flow Rate  2.00





Capillary Refill : Less Than 3 Seconds








Blood Pressure Mean:                    70








Progress Note :  


Progress Note


Seen and evaluated. IV, labs, UA, Cespedes catheter, chest x-ray, blood cultures, 

lactic acid and influenza screen ordered. LR 1 L bolus. Fentanyl 50 g IV for 

pain. Monitor patient. 1026: Patient is influenza B positive. Tamiflu 75 mg by 

mouth ordered. This was changed to Tamiflu 30 mg by mouth as she was found to be

in acute renal failure and this is the renal dosing. D5NS at 125 an hour 

initiated due to lower glucose. Monitor patient. 1040: I did discuss the case 

with Dr. Garcia and she accepts patient for admission, inpatient status. 

Pending UA. 1055: Small amount of urine obtained now. This is dark and cloudy. 

We will give additional LR 1 L bolus and send UA. Findings concerns discussed 

with patient and family who agree with plan. 1150: Does have questionable 

urinary tract infection. She is also had blood pressures that have been below 90

systolic. There is a question on if its because of her arm being bent and the 

scleroderma and contractures. Blood pressure improves with appropriate 

positioning of the arm. That being said, she is normally hypertensive and this 

would be a low blood pressure. We will continue IV fluids. I will add cefepime 1

g IV now. I have updated Dr. Garcia and she agrees. Patient will go to the ICU.

Admit, inpatient status. Patient agrees with plan. Patient is receiving fluids 

in range greater than 30 mL/kg IV. I attest to focused exam at this time.





Diagnostic Imaging





   Diagonstic Imaging:  Xray


   Plain Films/CT/US/NM/MRI:  chest


Comments


                 [facility].


                                [place], [place]





NAME:   YOEL DARDEN J


MED REC#:   [id number]


ACCOUNT#:   [id number]


PT STATUS:   REG ER


:   1960


PHYSICIAN:   SHERIDAN MONTILLA MD


ADMIT DATE:   20/ER


                                   ***Draft***


Date of Exam:20





CHEST 1 VIEW, AP/PA ONLY








Indication: Sepsis workup.





Time of exam 9:32 AM





No prior studies are available for comparison.





The heart size is normal. The pulmonary vascularity is


unremarkable. The lungs are clear. No infiltrate, effusion or


pneumothorax is detected.





Impression: No acute cardiopulmonary process is detected.





  Dictated on workstation # [id number]








Dict:   [id number]


Trans:   [id number]


CVB [id number]





Interpreted by:     ROBERTO RANOLD MD


Electronically signed by:





Departure


Communication (Admissions)


Time/Spoke to Admitting Phy:  10:40





Impression





   Primary Impression:  


   Influenza B


   Additional Impressions:  


   Acute renal failure


   Qualified Codes:  N17.9 - Acute kidney failure, unspecified


   Dehydration


   UTI (urinary tract infection)


   Qualified Codes:  N30.00 - Acute cystitis without hematuria


Disposition:   ADMITTED AS INPATIENT


Condition:  Stable





Admissions


Decision to Admit Reason:  Admit from ER (General)


Decision to Admit/Date:  Mar 18, 2020


Time/Decision to Admit Time:  10:40





Departure-Patient Inst.


Referrals:  


YENY GARCIA DO (PCP/Family)


Primary Care Physician











SHERIDAN MONTILLA MD          Mar 18, 2020 10:23

## 2020-03-18 NOTE — XMS REPORT
Continuity of Care Document

                             Created on: 2020



YEOL DARDEN

External Reference #: [id number]

: 1960

Sex: Female



Demographics





                          Address                   [address] at the [facility] [place]  [address]

 

                          Home Phone                [phone]

 

                          Preferred Language        Unknown

 

                          Marital Status            Unknown

 

                          Moravian Affiliation     Unknown

 

                          Race                      Unknown

 

                          Ethnic Group              Unknown





Author





                          Organization              Unknown

 

                          Address                   Unknown

 

                          Phone                     Unavailable



              



Allergies

      



             Active           Description           Code           Type         

  Severity   

                Reaction           Onset           Reported/Identified          

 

Relationship to Patient                 Clinical Status        

 

                Yes             No Allergy Information Available           

[id number]           

Drug Allergy           Unknown           N/A                             

018   

                                                             



                  



Medications

      



There is no data.                  



Problems

      



             Date Dx Coded           Attending           Type           Code    

       

Diagnosis                               Diagnosed By        

 

                10/09/2017           MERLIN MINA, FABRICIO WHARTON Ot    

          M34.9    

                          SYSTEMIC SCLEROSIS, UNSPECIFIED                    

 

                10/18/2017           MERLIN MINA, FABRICIO WHARTON Ot    

          M34.9    

                          SYSTEMIC SCLEROSIS, UNSPECIFIED                    

 

                11/15/2017           MERLIN MINA, FABRICIO WHARTON Ot    

          M34.9    

                          SYSTEMIC SCLEROSIS, UNSPECIFIED                    

 

                2018           MERLIN MINA, FABRICIO WHARTON Ot    

          M34.9    

                          SYSTEMIC SCLEROSIS, UNSPECIFIED                    

 

                2018           MERLIN MINA, FABRICIO WHARTON Ot    

          M34.9    

                          SYSTEMIC SCLEROSIS, UNSPECIFIED                    

 

                2018           CIARA ARCHER MD           Ot           

   I70.234         

                          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              

      

 

                2018           CIARA ARCHER MD           Ot           

   I70.244         

                          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              

      

 

                2018           CIARA ARCHER MD           Ot           

   L97.312         

                          NON-PRS CHRONIC ULCER OF RIGHT ANKLE W F              

      

 

                2018           CIARA ARCHER MD           Ot           

   L97.322         

                          NON-PRESSURE CHRONIC ULCER OF LEFT ANKLE              

      

 

                2018           CIARA ARCHER MD           Ot           

   L97.412         

                          NON-PRS CHR ULCER OF RIGHT HEEL AND MIDF              

      

 

                2018           CIARA ARCHER MD           Ot           

   L97.422         

                          NON-PRS CHR ULCER OF LEFT HEEL AND MIDFO              

      

 

             2018           CIARA ARCHER MD, Ot           M34

.9           

SYSTEMIC SCLEROSIS, UNSPECIFIED                    

 

                2018           CIARA ARCHER MD           Ot           

   I70.234         

                          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              

      

 

                2018           CIARA ARCHER MD           Ot           

   I70.244         

                          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              

      

 

                2018           CIARA ARCHER MD           Ot           

   L97.312         

                          NON-PRS CHRONIC ULCER OF RIGHT ANKLE W F              

      

 

                2018           CIARA ARCHER MD           Ot           

   L97.322         

                          NON-PRESSURE CHRONIC ULCER OF LEFT ANKLE              

      

 

                2018           CIARA ARCHER MD           Ot           

   L97.412         

                          NON-PRS CHR ULCER OF RIGHT HEEL AND MIDF              

      

 

                2018           CIARA ARCHER MD           Ot           

   L97.422         

                          NON-PRS CHR ULCER OF LEFT HEEL AND MIDFO              

      

 

             2018           CIARA ARCHER MD, Ot           M34

.9           

SYSTEMIC SCLEROSIS, UNSPECIFIED                    

 

                2018           CIARA ARCHER MD           Ot           

   I70.234         

                          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              

      

 

                2018           CIARA ARCHER MD           Ot           

   I70.244         

                          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              

      

 

                2018           CIARA ARCHER MD           Ot           

   L97.312         

                          NON-PRS CHRONIC ULCER OF RIGHT ANKLE W F              

      

 

                2018           CIARA ARCHER MD           Ot           

   L97.322         

                          NON-PRESSURE CHRONIC ULCER OF LEFT ANKLE              

      

 

                2018           CIARA ARCHER MD           Ot           

   L97.412         

                          NON-PRS CHR ULCER OF RIGHT HEEL AND MIDF              

      

 

                2018           CIARA ARCHER MD           Ot           

   L97.422         

                          NON-PRS CHR ULCER OF LEFT HEEL AND MIDFO              

      

 

             2018           CIARA ARCHER MD, Ot           M34

.9           

SYSTEMIC SCLEROSIS, UNSPECIFIED                    

 

                2018           CIARA ARCHER MD           Ot           

   I70.231         

                          ATHSCL NATIVE ARTERIES OF RIGHT LEG W UL              

      

 

                2018           CIARA ARCHER MD           Ot           

   I70.244         

                          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              

      

 

                2018           CIARA ARCHER MD           Ot           

   I87.333         

                          CHRONIC VENOUS HTN W ULCER AND INFLAM OF              

      

 

                2018           CIARA ARCHER MD           Ot           

   L97.312         

                          NON-PRS CHRONIC ULCER OF RIGHT ANKLE W F              

      

 

                2018           CIARA ARCHER MD           Ot           

   L97.322         

                          NON-PRESSURE CHRONIC ULCER OF LEFT ANKLE              

      

 

             2018           CIARA ARCHER MD           Ot           M34

.9           

SYSTEMIC SCLEROSIS, UNSPECIFIED                    

 

                2018           CIARA ARCHER MD           Ot           

   I70.234         

                          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              

      

 

                2018           CIARA ARCHER MD           Ot           

   I70.244         

                          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              

      

 

                2018           CIARA ARCHER MD           Ot           

   I87.333         

                          CHRONIC VENOUS HTN W ULCER AND INFLAM OF              

      

 

                2018           CIARA ARCHER MD           Ot           

   L97.312         

                          NON-PRS CHRONIC ULCER OF RIGHT ANKLE W F              

      

 

                2018           CIARA ARCHER MD, Ot           

   L97.322         

                          NON-PRESSURE CHRONIC ULCER OF LEFT ANKLE              

      

 

             2018           CIARA ARCHER MD, Ot           M34

.9           

SYSTEMIC SCLEROSIS, UNSPECIFIED                    

 

                2018           CIARA ARCHER MD           Ot           

   I70.234         

                          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              

      

 

                2018           CIARA ARCHER MD           Ot           

   L97.312         

                          NON-PRS CHRONIC ULCER OF RIGHT ANKLE W F              

      

 

                2018           CIARA ARCHER MD           Ot           

   I70.234         

                          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              

      

 

                2018           CIARA ARCHER MD, Ot           

   L97.312         

                          NON-PRS CHRONIC ULCER OF RIGHT ANKLE W F              

      

 

             2018           JSOH MINA, WILLIAM P           Ot           R94.

5           

ABNORMAL RESULTS OF LIVER FUNCTION STUDI                    

 

             2018           JOSH MINA, WILLIAM P           Ot           R94.

5           

ABNORMAL RESULTS OF LIVER FUNCTION STUDI                    

 

                2018           CIARA ARCHER MD           Ot           

   I70.234         

                          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              

      

 

                2018           CIARA ARCHER MD           Ot           

   I70.244         

                          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              

      

 

                2018           CIARA ARCHER MD           Ot           

   L97.312         

                          NON-PRS CHRONIC ULCER OF RIGHT ANKLE W F              

      

 

                2018           CIARA ARCHER MD           Ot           

   L97.322         

                          NON-PRESSURE CHRONIC ULCER OF LEFT ANKLE              

      

 

                2018           CIARA ARCHER MD           Ot           

   L97.412         

                          NON-PRS CHR ULCER OF RIGHT HEEL AND MIDF              

      

 

                2018           CIARA ARCHER MD           Ot           

   L97.422         

                          NON-PRS CHR ULCER OF LEFT HEEL AND MIDFO              

      

 

             2018           CIARA ARCHER MD, Ot           M34

.9           

SYSTEMIC SCLEROSIS, UNSPECIFIED                    

 

                2018           CIARA ARCHER MD           Ot           

   I70.234         

                          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              

      

 

                2018           CIARA ARCHER MD           Ot           

   I70.244         

                          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              

      

 

                2018           CIARA ARCHER MD           Ot           

   L97.312         

                          NON-PRS CHRONIC ULCER OF RIGHT ANKLE W F              

      

 

                2018           CIARA ARCHER MD           Ot           

   L97.322         

                          NON-PRESSURE CHRONIC ULCER OF LEFT ANKLE              

      

 

                2018           CIARA ARCHER MD           Ot           

   L97.412         

                          NON-PRS CHR ULCER OF RIGHT HEEL AND MIDF              

      

 

                2018           CIARA ARCHER MD           Ot           

   L97.422         

                          NON-PRS CHR ULCER OF LEFT HEEL AND MIDFO              

      

 

             2018           CIARA ARCHER MD, Ot           M34

.9           

SYSTEMIC SCLEROSIS, UNSPECIFIED                    

 

                2018           CIARA ARCHER MD           Ot           

   I70.231         

                          ATHSCL NATIVE ARTERIES OF RIGHT LEG W UL              

      

 

                2018           CIARA ARCHER MD           Ot           

   I70.244         

                          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              

      

 

                2018           CIARA ACRHER MD           Ot           

   I87.333         

                          CHRONIC VENOUS HTN W ULCER AND INFLAM OF              

      

 

                2018           CIARA ARCHER MD           Ot           

   L97.312         

                          NON-PRS CHRONIC ULCER OF RIGHT ANKLE W F              

      

 

                2018           CIARA ARCHER MD           Ot           

   L97.322         

                          NON-PRESSURE CHRONIC ULCER OF LEFT ANKLE              

      

 

             2018           CIARA ARCHER MD, Ot           M34

.9           

SYSTEMIC SCLEROSIS, UNSPECIFIED                    

 

                2018           CIARA ARCHER MD           Ot           

   I70.234         

                          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              

      

 

                2018           CIARA ARCHER MD           Ot           

   I70.244         

                          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              

      

 

                2018           CIARA ARCHER MD           Ot           

   I87.333         

                          CHRONIC VENOUS HTN W ULCER AND INFLAM OF              

      

 

                2018           CIARA ARCHER MD           Ot           

   L97.312         

                          NON-PRS CHRONIC ULCER OF RIGHT ANKLE W F              

      

 

                2018           CIARA ARCHER MD           Ot           

   L97.322         

                          NON-PRESSURE CHRONIC ULCER OF LEFT ANKLE              

      

 

             2018           CIARA ARCHER MD, Ot           M34

.9           

SYSTEMIC SCLEROSIS, UNSPECIFIED                    

 

                2018           YENY DINH DO           Ot      

        M34.9      

                          SYSTEMIC SCLEROSIS, UNSPECIFIED                    

 

                2018           YENY DINH DO           Ot      

        M34.9      

                          SYSTEMIC SCLEROSIS, UNSPECIFIED                    

 

                2018           CIARA ARCHER MD           Ot           

   I70.234         

                          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              

      

 

                2018           CIARA ARCHER MD           Ot           

   I70.244         

                          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              

      

 

                2018           CIARA ARCHER MD           Ot           

   L97.312         

                          NON-PRS CHRONIC ULCER OF RIGHT ANKLE W F              

      

 

                2018           CIARA ARCHER MD           Ot           

   L97.322         

                          NON-PRESSURE CHRONIC ULCER OF LEFT ANKLE              

      

 

                2018           CIARA ARCHER MD           Ot           

   L97.412         

                          NON-PRS CHR ULCER OF RIGHT HEEL AND MIDF              

      

 

                2018           CIARA ARCHER MD           Ot           

   L97.422         

                          NON-PRS CHR ULCER OF LEFT HEEL AND MIDFO              

      

 

             2018           CIARA ARCHER MD, Ot           M34

.9           

SYSTEMIC SCLEROSIS, UNSPECIFIED                    

 

                2018           CIARA ARCHER MD           Ot           

   I70.234         

                          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              

      

 

                2018           CIARA ARCHER MD           Ot           

   I70.244         

                          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              

      

 

                2018           CIARA ARCHER MD           Ot           

   L97.312         

                          NON-PRS CHRONIC ULCER OF RIGHT ANKLE W F              

      

 

                2018           CIARA ARCHER MD           Ot           

   L97.322         

                          NON-PRESSURE CHRONIC ULCER OF LEFT ANKLE              

      

 

                2018           CIARA ARCHER MD           Ot           

   L97.412         

                          NON-PRS CHR ULCER OF RIGHT HEEL AND MIDF              

      

 

                2018           CIARA ARCHER MD           Ot           

   L97.422         

                          NON-PRS CHR ULCER OF LEFT HEEL AND MIDFO              

      

 

             2018           CIARA ARCHER MD, Ot           M34

.9           

SYSTEMIC SCLEROSIS, UNSPECIFIED                    

 

                2018           CIARA ARCHER MD           Ot           

   I70.231         

                          ATHSCL NATIVE ARTERIES OF RIGHT LEG W UL              

      

 

                2018           CIARA ARCHER MD           Ot           

   I70.244         

                          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              

      

 

                2018           CIARA ARCHER MD           Ot           

   I87.333         

                          CHRONIC VENOUS HTN W ULCER AND INFLAM OF              

      

 

                2018           CIARA ARCHER MD           Ot           

   L97.312         

                          NON-PRS CHRONIC ULCER OF RIGHT ANKLE W F              

      

 

                2018           CIARA ARCHER MD           Ot           

   L97.322         

                          NON-PRESSURE CHRONIC ULCER OF LEFT ANKLE              

      

 

             2018           CIARA ARCHER MD, Ot           M34

.9           

SYSTEMIC SCLEROSIS, UNSPECIFIED                    

 

                2018           CIARA ARCHER MD           Ot           

   I70.234         

                          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              

      

 

                2018           CIARA ARCHER MD           Ot           

   I70.244         

                          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              

      

 

                2018           CIARA ARCHER MD           Ot           

   I87.333         

                          CHRONIC VENOUS HTN W ULCER AND INFLAM OF              

      

 

                2018           CIARA ARCHER MD, Ot           

   L97.312         

                          NON-PRS CHRONIC ULCER OF RIGHT ANKLE W F              

      

 

                2018           CIARA ARCHER MD           Ot           

   L97.322         

                          NON-PRESSURE CHRONIC ULCER OF LEFT ANKLE              

      

 

             2018           CIARA ARCHER MD, Ot           M34

.9           

SYSTEMIC SCLEROSIS, UNSPECIFIED                    

 

                2018           YENY DINH DO           Ot      

        M34.9      

                          SYSTEMIC SCLEROSIS, UNSPECIFIED                    

 

                2018           MERLIN MINA, FABRICIO WHARTON           Ot    

          M34.9    

                          SYSTEMIC SCLEROSIS, UNSPECIFIED                    

 

                2018           ROBINA QUEEN           Ot      

        J98.11     

                          ATELECTASIS                        

 

                2018           ROBINA QUEEN APRN           Ot      

        J98.4      

                          OTHER DISORDERS OF LUNG                    

 

                2018           ROBINA QUEEN           Ot      

        M34.9      

                          SYSTEMIC SCLEROSIS, UNSPECIFIED                    

 

                2018           ROBINA QUEEN           Ot      

        R94.2      

                          ABNORMAL RESULTS OF PULMONARY FUNCTION S              

      

 

                2018           CIARA ARCHER MD           Ot           

   I70.234         

                          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              

      

 

                2018           CIARA ARCHER MD           Ot           

   I70.244         

                          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              

      

 

                2018           CIARA ARCHER MD           Ot           

   L97.312         

                          NON-PRS CHRONIC ULCER OF RIGHT ANKLE W F              

      

 

                2018           CIARA ARCHER MD           Ot           

   L97.322         

                          NON-PRESSURE CHRONIC ULCER OF LEFT ANKLE              

      

 

                2018           CIARA ARCHER MD           Ot           

   L97.412         

                          NON-PRS CHR ULCER OF RIGHT HEEL AND MIDF              

      

 

                2018           CIARA ARCHER MD           Ot           

   L97.422         

                          NON-PRS CHR ULCER OF LEFT HEEL AND MIDFO              

      

 

             2018           CIARA ARCHER MD, Ot           M34

.9           

SYSTEMIC SCLEROSIS, UNSPECIFIED                    

 

                2018           CIARA ARCHER MD           Ot           

   I70.234         

                          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              

      

 

                2018           CIARA ARCHER MD           Ot           

   I70.244         

                          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              

      

 

                2018           CIARA ARCHER MD           Ot           

   L97.312         

                          NON-PRS CHRONIC ULCER OF RIGHT ANKLE W F              

      

 

                2018           CIARA ARCHER MD, Ot           

   L97.322         

                          NON-PRESSURE CHRONIC ULCER OF LEFT ANKLE              

      

 

                2018           CIARA ARCHER MD, Ot           

   L97.412         

                          NON-PRS CHR ULCER OF RIGHT HEEL AND MIDF              

      

 

                2018           CIARA ARCHER MD, Ot           

   L97.422         

                          NON-PRS CHR ULCER OF LEFT HEEL AND MIDFO              

      

 

             2018           CIARA ARCHER MD, Ot           M34

.9           

SYSTEMIC SCLEROSIS, UNSPECIFIED                    

 

                2018           CIARA ARCHER MD           Ot           

   I70.231         

                          ATHSCL NATIVE ARTERIES OF RIGHT LEG W UL              

      

 

                2018           CIARA ARCHER MD, Ot           

   I70.244         

                          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              

      

 

                2018           CIARA ARCHER MD, Ot           

   I87.333         

                          CHRONIC VENOUS HTN W ULCER AND INFLAM OF              

      

 

                2018           CIARA ARCHER MD, Ot           

   L97.312         

                          NON-PRS CHRONIC ULCER OF RIGHT ANKLE W F              

      

 

                2018           CIARA ARCHER MD, Ot           

   L97.322         

                          NON-PRESSURE CHRONIC ULCER OF LEFT ANKLE              

      

 

             2018           CIARA ARCHER MD, Ot           M34

.9           

SYSTEMIC SCLEROSIS, UNSPECIFIED                    

 

                2018           CIARA ARCHER MD           Ot           

   I70.234         

                          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              

      

 

                2018           CIARA ARCHER MD           Ot           

   I70.244         

                          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              

      

 

                2018           CIARA ARCHER MD           Ot           

   I87.333         

                          CHRONIC VENOUS HTN W ULCER AND INFLAM OF              

      

 

                2018           CIARA ARCHER MD           Ot           

   L97.312         

                          NON-PRS CHRONIC ULCER OF RIGHT ANKLE W F              

      

 

                2018           CIARA ARCHER MD, Ot           

   L97.322         

                          NON-PRESSURE CHRONIC ULCER OF LEFT ANKLE              

      

 

             2018           CIARA ARCHER MD, Ot           M34

.9           

SYSTEMIC SCLEROSIS, UNSPECIFIED                    

 

                2018           YENY DINH DO           Ot      

        M34.9      

                          SYSTEMIC SCLEROSIS, UNSPECIFIED                    

 

             2018           LYNSEY SANTILLAN           Ot           D47.2  

         

MONOCLONAL GAMMOPATHY                            

 

             2018           LYNSEY SANTILLAN           Ot           D64.9  

         

ANEMIA, UNSPECIFIED                              

 

             2018           LYNSEY SANTILLAN           Ot           I10    

       

ESSENTIAL (PRIMARY) HYPERTENSION                    

 

             2018           LYNSEY SANTILLAN           Ot           J98.4  

         

OTHER DISORDERS OF LUNG                          

 

             2018           LYNSEY SANTILLAN N           Ot           M34.9  

         

SYSTEMIC SCLEROSIS, UNSPECIFIED                    

 

             2018           LYNSEY SANTILLAN           Ot           Z79.899

           

OTHER LONG TERM (CURRENT) DRUG THERAPY                    

 

                2018           ROBINA QUEEN APRN           Ot      

        J98.11     

                          ATELECTASIS                        

 

                2018           ROBINA QUEEN APRN           Ot      

        J98.4      

                          OTHER DISORDERS OF LUNG                    

 

                2018           BRETROBINA ONTIVEROS APRN           Ot      

        M34.9      

                          SYSTEMIC SCLEROSIS, UNSPECIFIED                    

 

                2018           ROBINA QUEEN APRN           Ot      

        R94.2      

                          ABNORMAL RESULTS OF PULMONARY FUNCTION S              

      

 

             2018           LYNSEY SANTILLAN N           Ot           M19.031

           

PRIMARY OSTEOARTHRITIS, RIGHT WRIST                    

 

             2018           LYNSEY SANTILLAN N           Ot           M19.032

           

PRIMARY OSTEOARTHRITIS, LEFT WRIST                    

 

             2018           LYNSEY SANTILLAN N           Ot           M34.9  

         

SYSTEMIC SCLEROSIS, UNSPECIFIED                    

 

             2018           LYNSEY SANTILLAN N           Ot           M19.031

           

PRIMARY OSTEOARTHRITIS, RIGHT WRIST                    

 

             2018           LYNSEY SANTILLAN N           Ot           M19.032

           

PRIMARY OSTEOARTHRITIS, LEFT WRIST                    

 

             2018           LYNSEY SANTILLAN N           Ot           M34.9  

         

SYSTEMIC SCLEROSIS, UNSPECIFIED                    

 

                2018           CIARA ARCHER MD           Ot           

   I70.234         

                          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              

      

 

                2018           CIARA ARCHER MD           Ot           

   I70.244         

                          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              

      

 

                2018           CIARA ARCHER MD           Ot           

   L97.312         

                          NON-PRS CHRONIC ULCER OF RIGHT ANKLE W F              

      

 

                2018           CIARA ARCHER MD           Ot           

   L97.322         

                          NON-PRESSURE CHRONIC ULCER OF LEFT ANKLE              

      

 

                2018           CIARA ARCHER MD           Ot           

   L97.412         

                          NON-PRS CHR ULCER OF RIGHT HEEL AND MIDF              

      

 

                2018           CIARA ARCHER MD           Ot           

   L97.422         

                          NON-PRS CHR ULCER OF LEFT HEEL AND MIDFO              

      

 

             2018           CIARA ARCHER MD           Ot           M34

.9           

SYSTEMIC SCLEROSIS, UNSPECIFIED                    

 

                2018           CIARA ARCHER MD           Ot           

   I70.234         

                          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              

      

 

                2018           CIARA ARCHER MD           Ot           

   I70.244         

                          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              

      

 

                2018           CIARA ARCHER MD, Ot           

   L97.312         

                          NON-PRS CHRONIC ULCER OF RIGHT ANKLE W F              

      

 

                2018           CIARA ARCHER MD           Ot           

   L97.322         

                          NON-PRESSURE CHRONIC ULCER OF LEFT ANKLE              

      

 

                2018           CIARA ARCHER MD, Ot           

   L97.412         

                          NON-PRS CHR ULCER OF RIGHT HEEL AND MIDF              

      

 

                2018           CIARA ARCHER MD, Ot           

   L97.422         

                          NON-PRS CHR ULCER OF LEFT HEEL AND MIDFO              

      

 

             2018           CIARA ARCHER MD, Ot           M34

.9           

SYSTEMIC SCLEROSIS, UNSPECIFIED                    

 

                2018           CIARA ARCEHR MD           Ot           

   I70.231         

                          ATHSCL NATIVE ARTERIES OF RIGHT LEG W UL              

      

 

                2018           CIARA ARCHER MD, Ot           

   I70.244         

                          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              

      

 

                2018           CIARA ARCHER MD           Ot           

   I87.333         

                          CHRONIC VENOUS HTN W ULCER AND INFLAM OF              

      

 

                2018           CIARA ARCHER MD           Ot           

   L97.312         

                          NON-PRS CHRONIC ULCER OF RIGHT ANKLE W F              

      

 

                2018           CIARA ARCHER MD           Ot           

   L97.322         

                          NON-PRESSURE CHRONIC ULCER OF LEFT ANKLE              

      

 

             2018           CIARA ARCHER MD, Ot           M34

.9           

SYSTEMIC SCLEROSIS, UNSPECIFIED                    

 

                2018           CIARA ARCHER MD           Ot           

   I70.234         

                          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              

      

 

                2018           CIARA ARCHER MD           Ot           

   I70.244         

                          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              

      

 

                2018           CIARA ARCHER MD           Ot           

   I87.333         

                          CHRONIC VENOUS HTN W ULCER AND INFLAM OF              

      

 

                2018           CIARA ARCHER MD           Ot           

   L97.312         

                          NON-PRS CHRONIC ULCER OF RIGHT ANKLE W F              

      

 

                2018           CIARA ARCHER MD           Ot           

   L97.322         

                          NON-PRESSURE CHRONIC ULCER OF LEFT ANKLE              

      

 

             2018           CIARA ARCHER MD, Ot           M34

.9           

SYSTEMIC SCLEROSIS, UNSPECIFIED                    

 

                2018           YENY DINH DO           Ot      

        M34.9      

                          SYSTEMIC SCLEROSIS, UNSPECIFIED                    

 

             2018           LYNSEY SANTILLAN           Ot           D47.2  

         

MONOCLONAL GAMMOPATHY                            

 

             2018           LYNSEY SANTILLAN           Ot           D64.9  

         

ANEMIA, UNSPECIFIED                              

 

             2018           LYNSEY SANTILLAN           Ot           I10    

       

ESSENTIAL (PRIMARY) HYPERTENSION                    

 

             2018           TYRELL, BOBAN N           Ot           J98.4  

         

OTHER DISORDERS OF LUNG                          

 

             2018           TYRELL, CONNORAN N           Ot           M34.9  

         

SYSTEMIC SCLEROSIS, UNSPECIFIED                    

 

             2018           TYRELL, BOBAN N           Ot           Z79.899

           

OTHER LONG TERM (CURRENT) DRUG THERAPY                    

 

                2018           ROBINA QUEEN APRN           Ot      

        J98.11     

                          ATELECTASIS                        

 

                2018           BRETROBINA ONTIVEROS APRN           Ot      

        J98.4      

                          OTHER DISORDERS OF LUNG                    

 

                2018           BRETROBINA E APRN           Ot      

        M34.9      

                          SYSTEMIC SCLEROSIS, UNSPECIFIED                    

 

                2018           BRETROBINA ONTIVEROS E APRN           Ot      

        R94.2      

                          ABNORMAL RESULTS OF PULMONARY FUNCTION S              

      

 

             2018           TYRELL BOBAN N           Ot           M19.031

           

PRIMARY OSTEOARTHRITIS, RIGHT WRIST                    

 

             2018           TYRELL, BOBAN N           Ot           M19.032

           

PRIMARY OSTEOARTHRITIS, LEFT WRIST                    

 

             2018           TYRELL BOBAN N           Ot           M34.9  

         

SYSTEMIC SCLEROSIS, UNSPECIFIED                    

 

             2018           TYRELL BOBAN N           Ot           M19.031

           

PRIMARY OSTEOARTHRITIS, RIGHT WRIST                    

 

             2018           TYRELL, BOBAN N           Ot           M19.032

           

PRIMARY OSTEOARTHRITIS, LEFT WRIST                    

 

             2018           TYRELL, BOBAN N           Ot           M34.9  

         

SYSTEMIC SCLEROSIS, UNSPECIFIED                    

 

             01/10/2019           TYRELL, BOBAN N           Ot           D47.2  

         

MONOCLONAL GAMMOPATHY                            

 

             01/10/2019           TYRELL, BOBAN N           Ot           D64.9  

         

ANEMIA, UNSPECIFIED                              

 

             01/10/2019           TYRELL, BOBAN N           Ot           I10    

       

ESSENTIAL (PRIMARY) HYPERTENSION                    

 

             01/10/2019           TYRELL BOBAN N           Ot           J98.4  

         

OTHER DISORDERS OF LUNG                          

 

             01/10/2019           TYRELL, BOBAN N           Ot           M34.9  

         

SYSTEMIC SCLEROSIS, UNSPECIFIED                    

 

             01/10/2019           TYRELL, BOBAN N           Ot           Z79.899

           

OTHER LONG TERM (CURRENT) DRUG THERAPY                    

 

             2019           TYRELL, BOBAN N           Ot           D47.2  

         

MONOCLONAL GAMMOPATHY                            

 

             2019           TYRELL, BOBAN N           Ot           D64.9  

         

ANEMIA, UNSPECIFIED                              

 

             2019           TYRELL, BOBAN N           Ot           I10    

       

ESSENTIAL (PRIMARY) HYPERTENSION                    

 

             2019           TYRELL BOBAN N           Ot           J98.4  

         

OTHER DISORDERS OF LUNG                          

 

             2019           TYRELL, BOBAN N           Ot           M34.9  

         

SYSTEMIC SCLEROSIS, UNSPECIFIED                    

 

             2019           LYNSEY SANTILLAN           Ot           Z79.899

           

OTHER LONG TERM (CURRENT) DRUG THERAPY                    

 

             2019           LYNSEY SANTILLAN           Ot           D47.2  

         

MONOCLONAL GAMMOPATHY                            

 

             2019           LYNSEY SANTILLAN N           Ot           D64.9  

         

ANEMIA, UNSPECIFIED                              

 

             2019           LYNSEY SANTILLAN N           Ot           I10    

       

ESSENTIAL (PRIMARY) HYPERTENSION                    

 

             2019           LYNSEY SANTILLAN USMAN           Ot           J98.4  

         

OTHER DISORDERS OF LUNG                          

 

             2019           LYNSEY SANTILLAN N           Ot           M34.9  

         

SYSTEMIC SCLEROSIS, UNSPECIFIED                    

 

             2019           LYNSEY SANTILLAN N           Ot           Z79.899

           

OTHER LONG TERM (CURRENT) DRUG THERAPY                    

 

             2019           LYNSEY SANTILLAN           Ot           D47.2  

         

MONOCLONAL GAMMOPATHY                            

 

             2019           LYNSEY SANTILLAN USMAN           Ot           D64.9  

         

ANEMIA, UNSPECIFIED                              

 

             2019           LYNSEY SANTILLAN N           Ot           I10    

       

ESSENTIAL (PRIMARY) HYPERTENSION                    

 

             2019           LYNSEY SANTILLAN USMAN           Ot           J98.4  

         

OTHER DISORDERS OF LUNG                          

 

             2019           LYNSEY SANTILLAN USMAN           Ot           M34.9  

         

SYSTEMIC SCLEROSIS, UNSPECIFIED                    

 

             2019           LYNSEY SANTILLAN USMAN           Ot           Z79.899

           

OTHER LONG TERM (CURRENT) DRUG THERAPY                    

 

             2019           ZARA TORO MD, Ot           M34.9

           

SYSTEMIC SCLEROSIS, UNSPECIFIED                    

 

             2019           ZARA TORO MD           Ot           Z79.8

99           

OTHER LONG TERM (CURRENT) DRUG THERAPY                    

 

                2019           CIARA ARCHER MD           Ot           

   I70.234         

                          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              

      

 

                2019           CIARA ARCHER MD           Ot           

   I70.244         

                          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              

      

 

                2019           CIARA ARCHER MD, Ot           

   L97.312         

                          NON-PRS CHRONIC ULCER OF RIGHT ANKLE W F              

      

 

                2019           CIARA ARCHER MD, Ot           

   L97.322         

                          NON-PRESSURE CHRONIC ULCER OF LEFT ANKLE              

      

 

                2019           CIARA ARCHER MD, Ot           

   L97.412         

                          NON-PRS CHR ULCER OF RIGHT HEEL AND MIDF              

      

 

                2019           CIARA ARCHER MD, Ot           

   L97.422         

                          NON-PRS CHR ULCER OF LEFT HEEL AND MIDFO              

      

 

             2019           CIARA ARCHER MD, Ot           M34

.9           

SYSTEMIC SCLEROSIS, UNSPECIFIED                    

 

                2019           CIARA ARCHER MD           Ot           

   I70.234         

                          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              

      

 

                2019           CIARA ARCHER MD           Ot           

   I70.244         

                          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              

      

 

                2019           CIARA ARCHER MD           Ot           

   L97.312         

                          NON-PRS CHRONIC ULCER OF RIGHT ANKLE W F              

      

 

                2019           CIARA ARCHER MD           Ot           

   L97.322         

                          NON-PRESSURE CHRONIC ULCER OF LEFT ANKLE              

      

 

                2019           CIARA ARCHER MD           Ot           

   L97.412         

                          NON-PRS CHR ULCER OF RIGHT HEEL AND MIDF              

      

 

                2019           CIARA ARCHER MD           Ot           

   L97.422         

                          NON-PRS CHR ULCER OF LEFT HEEL AND MIDFO              

      

 

             2019           CIARA ARCHER MD, Ot           M34

.9           

SYSTEMIC SCLEROSIS, UNSPECIFIED                    

 

                2019           CIARA ARCHER MD           Ot           

   I70.231         

                          ATHSCL NATIVE ARTERIES OF RIGHT LEG W UL              

      

 

                2019           CIARA ARCHER MD           Ot           

   I70.244         

                          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              

      

 

                2019           CIARA ARCHER MD           Ot           

   I87.333         

                          CHRONIC VENOUS HTN W ULCER AND INFLAM OF              

      

 

                2019           CIARA ARCHER MD           Ot           

   L97.312         

                          NON-PRS CHRONIC ULCER OF RIGHT ANKLE W F              

      

 

                2019           CIARA ARCHER MD           Ot           

   L97.322         

                          NON-PRESSURE CHRONIC ULCER OF LEFT ANKLE              

      

 

             2019           CIARA ARCHER MD, Ot           M34

.9           

SYSTEMIC SCLEROSIS, UNSPECIFIED                    

 

                2019           CIARA ARCHER MD           Ot           

   I70.234         

                          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              

      

 

                2019           CIARA ARCHER MD           Ot           

   I70.244         

                          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              

      

 

                2019           CIARA ARCHER MD           Ot           

   I87.333         

                          CHRONIC VENOUS HTN W ULCER AND INFLAM OF              

      

 

                2019           CIARA ARCHER MD           Ot           

   L97.312         

                          NON-PRS CHRONIC ULCER OF RIGHT ANKLE W F              

      

 

                2019           CIARA ARCHER MD           Ot           

   L97.322         

                          NON-PRESSURE CHRONIC ULCER OF LEFT ANKLE              

      

 

             2019           CIARA ARCHER MD, Ot           M34

.9           

SYSTEMIC SCLEROSIS, UNSPECIFIED                    

 

                2019           ORENDER DO, YENY S           Ot      

        M34.9      

                          SYSTEMIC SCLEROSIS, UNSPECIFIED                    

 

                2019           ROBINA QUEEN APRN           Ot      

        J98.11     

                          ATELECTASIS                        

 

                2019           ROBINA QUEEN APRN           Ot      

        J98.4      

                          OTHER DISORDERS OF LUNG                    

 

                2019           ROBINA QUEEN APRN           Ot      

        M34.9      

                          SYSTEMIC SCLEROSIS, UNSPECIFIED                    

 

                2019           ROBINA QUEEN APRN           Ot      

        R94.2      

                          ABNORMAL RESULTS OF PULMONARY FUNCTION S              

      

 

             2019           TYRELL LYNSEY N           Ot           M19.031

           

PRIMARY OSTEOARTHRITIS, RIGHT WRIST                    

 

             2019           TYRELL, LYNSEY N           Ot           M19.032

           

PRIMARY OSTEOARTHRITIS, LEFT WRIST                    

 

             2019           TYRELL, LYNSEY N           Ot           M34.9  

         

SYSTEMIC SCLEROSIS, UNSPECIFIED                    

 

             2019           TYRELLLYNSEY N           Ot           D47.2  

         

MONOCLONAL GAMMOPATHY                            

 

             2019           TYRELLLYNSEY N           Ot           D64.9  

         

ANEMIA, UNSPECIFIED                              

 

             2019           TYRELL, BOBAN N           Ot           I10    

       

ESSENTIAL (PRIMARY) HYPERTENSION                    

 

             2019           LYNSEY SANTILLAN N           Ot           J98.4  

         

OTHER DISORDERS OF LUNG                          

 

             2019           TYRELLLYNSEY N           Ot           M34.9  

         

SYSTEMIC SCLEROSIS, UNSPECIFIED                    

 

             2019           TYRELLLYNSEY N           Ot           Z79.899

           

OTHER LONG TERM (CURRENT) DRUG THERAPY                    

 

             2019           ZARA TORO MD           Ot           M34.9

           

SYSTEMIC SCLEROSIS, UNSPECIFIED                    

 

             2019           ZARA TORO MD           Ot           Z79.8

99           

OTHER LONG TERM (CURRENT) DRUG THERAPY                    

 

             2019           ZARA TORO MD           Ot           Z51.8

1           

ENCOUNTER FOR THERAPEUTIC DRUG LEVEL MON                    

 

             2019           ZARA TORO MD B           Ot           Z79.8

99           

OTHER LONG TERM (CURRENT) DRUG THERAPY                    

 

             2019           ZARA TORO MD           Ot           Z51.8

1           

ENCOUNTER FOR THERAPEUTIC DRUG LEVEL MON                    

 

             2019           ZARA TORO MD B           Ot           Z79.8

99           

OTHER LONG TERM (CURRENT) DRUG THERAPY                    

 

             2019           LYNSEY SANTILLAN N           Ot           D47.2  

         

MONOCLONAL GAMMOPATHY                            

 

             2019           LYNSEY SANTILLAN N           Ot           D64.9  

         

ANEMIA, UNSPECIFIED                              

 

             2019           TYRELL, BOBAN N           Ot           I10    

       

ESSENTIAL (PRIMARY) HYPERTENSION                    

 

             2019           LYNSEY SANTILLAN N           Ot           J98.4  

         

OTHER DISORDERS OF LUNG                          

 

             2019           LYNSEY SANTILLAN N           Ot           M34.9  

         

SYSTEMIC SCLEROSIS, UNSPECIFIED                    

 

             2019           LYNSEY SANTILLAN N           Ot           Z79.899

           

OTHER LONG TERM (CURRENT) DRUG THERAPY                    

 

             2019           LYNSEY SANTILLAN N           Ot           D47.2  

         

MONOCLONAL GAMMOPATHY                            

 

             2019           LYNSEY SANTILLAN N           Ot           D64.9  

         

ANEMIA, UNSPECIFIED                              

 

             2019           TYRELLLYNSEY N           Ot           I10    

       

ESSENTIAL (PRIMARY) HYPERTENSION                    

 

             2019           LYNSEY SANTILLAN N           Ot           J98.4  

         

OTHER DISORDERS OF LUNG                          

 

             2019           TYRLEL, LYNSEY N           Ot           M34.9  

         

SYSTEMIC SCLEROSIS, UNSPECIFIED                    

 

             2019           LYNSEY SANTILLAN N           Ot           Z79.899

           

OTHER LONG TERM (CURRENT) DRUG THERAPY                    

 

             2019           CORTEZ MINA, ZARA B           Ot           M34.9

           

SYSTEMIC SCLEROSIS, UNSPECIFIED                    

 

             2019           CORTEZ MINA, ZARA B           Ot           Z79.8

99           

OTHER LONG TERM (CURRENT) DRUG THERAPY                    

 

             2020           LYNSEY SANTILLAN N           Ot           D47.2  

         

MONOCLONAL GAMMOPATHY                            

 

             2020           LYNSEY SANTILLAN N           Ot           D64.9  

         

ANEMIA, UNSPECIFIED                              

 

             2020           TYRELL BOBMARKOS N           Ot           I10    

       

ESSENTIAL (PRIMARY) HYPERTENSION                    

 

             2020           LYNSEY SANTILLAN N           Ot           J98.4  

         

OTHER DISORDERS OF LUNG                          

 

             2020           LYNSEY SANTILLAN N           Ot           M34.9  

         

SYSTEMIC SCLEROSIS, UNSPECIFIED                    

 

             2020           TYRELL BOBMARKOS N           Ot           Z79.899

           

OTHER LONG TERM (CURRENT) DRUG THERAPY                    



                                                                                
                                                                                
                                                                                
                                                                                
                                                                                
                                                                                
                                                                                
             



Procedures

      



There is no data.                  



Results

      



                    Test                Result              Range        

 

                                        Complete blood count (CBC) with automate

d white blood cell (WBC) differential - 

10/06/17 12:48         

 

                          Blood leukocytes automated count (number/volume)      

     4.6 10*3/uL          

                                        4.3-11.0        

 

                          Blood erythrocytes automated count (number/volume)    

       4.29 10*6/uL       

                                        4.35-5.85        

 

                    Venous blood hemoglobin measurement (mass/volume)           

12.5 g/dL           

11.5-16.0        

 

                    Blood hematocrit (volume fraction)           39 %           

     35-52        

 

                    Automated erythrocyte mean corpuscular volume           92 [

foz_us]           

80-99        

 

                                        Automated erythrocyte mean corpuscular h

emoglobin (mass per erythrocyte)        

                          29 pg                     25-34        

 

                                        Automated erythrocyte mean corpuscular h

emoglobin concentration measurement 

(mass/volume)             32 g/dL                   32-36        

 

                    Automated erythrocyte distribution width ratio           13.

8 %              10.0-

14.5        

 

                    Automated blood platelet count (count/volume)           426 

10*3/uL           

130-400        

 

                          Automated blood platelet mean volume measurement      

     8.9 [foz_us]         

                                        7.4-10.4        

 

                    Automated blood neutrophils/100 leukocytes           51 %   

             42-75       

 

 

                    Automated blood lymphocytes/100 leukocytes           36 %   

             12-44       

 

 

                    Blood monocytes/100 leukocytes           12 %               

 0-12        

 

                    Automated blood eosinophils/100 leukocytes           1 %    

             0-10        

 

                    Automated blood basophils/100 leukocytes           1 %      

           0-10        

 

                    Blood neutrophils automated count (number/volume)           

2.3 10*3            

1.8-7.8        

 

                    Blood lymphocytes automated count (number/volume)           

1.6 10*3            

1.0-4.0        

 

                    Blood monocytes automated count (number/volume)           0.

5 10*3            

0.0-1.0        

 

                    Automated eosinophil count           0.1 10*3/uL           0

.0-0.3        

 

                    Automated blood basophil count (count/volume)           0.0 

10*3/uL           

0.0-0.1        

 

                                        Comprehensive metabolic panel - 10/06/17

 12:48         

 

                          Serum or plasma sodium measurement (moles/volume)     

      138 mmol/L          

                                        135-145        

 

                          Serum or plasma potassium measurement (moles/volume)  

         3.9 mmol/L       

                                        3.6-5.0        

 

                          Serum or plasma chloride measurement (moles/volume)   

        108 mmol/L        

                                                

 

                    Carbon dioxide           23 mmol/L           21-32        

 

                          Serum or plasma anion gap determination (moles/volume)

           7 mmol/L       

                                        5-14        

 

                          Serum or plasma urea nitrogen measurement (mass/volume

)           9 mg/dL       

                                        7-18        

 

                          Serum or plasma creatinine measurement (mass/volume)  

         0.65 mg/dL       

                                        0.60-1.30        

 

                    Serum or plasma urea nitrogen/creatinine mass ratio         

  14                  NRG 

       

 

                                        Serum or plasma creatinine measurement w

ith calculation of estimated glomerular 

filtration rate           >                         NRG        

 

                    Serum or plasma glucose measurement (mass/volume)           

106 mg/dL           

        

 

                    Serum or plasma calcium measurement (mass/volume)           

9.9 mg/dL           

8.5-10.1        

 

                          Serum or plasma total bilirubin measurement (mass/volu

me)           0.5 mg/dL   

                                        0.1-1.0        

 

                                        Serum or plasma alkaline phosphatase haylie

surement (enzymatic activity/volume)    

                          140 U/L                           

 

                                        Serum or plasma aspartate aminotransfera

se measurement (enzymatic 

activity/volume)           71 U/L                    5-34        

 

                                        Serum or plasma alanine aminotransferase

 measurement (enzymatic activity/volume)

                          100 U/L                   0-55        

 

                    Serum or plasma protein measurement (mass/volume)           

8.6 g/dL            

6.4-8.2        

 

                    Serum or plasma albumin measurement (mass/volume)           

4.1 g/dL            

3.2-4.5        

 

                                        Complete blood count (CBC) with automate

d white blood cell (WBC) differential - 

18 15:48         

 

                          Blood leukocytes automated count (number/volume)      

     5.7 10*3/uL          

                                        4.3-11.0        

 

                          Blood erythrocytes automated count (number/volume)    

       3.90 10*6/uL       

                                        4.35-5.85        

 

                    Venous blood hemoglobin measurement (mass/volume)           

11.9 g/dL           

11.5-16.0        

 

                    Blood hematocrit (volume fraction)           36 %           

     35-52        

 

                    Automated erythrocyte mean corpuscular volume           92 [

foz_us]           

80-99        

 

                                        Automated erythrocyte mean corpuscular h

emoglobin (mass per erythrocyte)        

                          31 pg                     25-34        

 

                                        Automated erythrocyte mean corpuscular h

emoglobin concentration measurement 

(mass/volume)             33 g/dL                   32-36        

 

                    Automated erythrocyte distribution width ratio           13.

6 %              10.0-

14.5        

 

                    Automated blood platelet count (count/volume)           316 

10*3/uL           

130-400        

 

                          Automated blood platelet mean volume measurement      

     9.1 [foz_us]         

                                        7.4-10.4        

 

                    Automated blood neutrophils/100 leukocytes           62 %   

             42-75       

 

 

                    Automated blood lymphocytes/100 leukocytes           25 %   

             12-44       

 

 

                    Blood monocytes/100 leukocytes           10 %               

 0-12        

 

                    Automated blood eosinophils/100 leukocytes           3 %    

             0-10        

 

                    Automated blood basophils/100 leukocytes           0 %      

           0-10        

 

                    Blood neutrophils automated count (number/volume)           

3.6 10*3            

1.8-7.8        

 

                    Blood lymphocytes automated count (number/volume)           

1.4 10*3            

1.0-4.0        

 

                    Blood monocytes automated count (number/volume)           0.

6 10*3            

0.0-1.0        

 

                    Automated eosinophil count           0.1 10*3/uL           0

.0-0.3        

 

                    Automated blood basophil count (count/volume)           0.0 

10*3/uL           

0.0-0.1        

 

                                        Comprehensive metabolic panel - 04/04/18

 15:48         

 

                          Serum or plasma sodium measurement (moles/volume)     

      140 mmol/L          

                                        135-145        

 

                          Serum or plasma potassium measurement (moles/volume)  

         3.8 mmol/L       

                                        3.6-5.0        

 

                          Serum or plasma chloride measurement (moles/volume)   

        109 mmol/L        

                                                

 

                    Carbon dioxide           25 mmol/L           21-32        

 

                          Serum or plasma anion gap determination (moles/volume)

           6 mmol/L       

                                        5-14        

 

                          Serum or plasma urea nitrogen measurement (mass/volume

)           7 mg/dL       

                                        7-18        

 

                          Serum or plasma creatinine measurement (mass/volume)  

         0.57 mg/dL       

                                        0.60-1.30        

 

                    Serum or plasma urea nitrogen/creatinine mass ratio         

  12                  NRG 

       

 

                                        Serum or plasma creatinine measurement w

ith calculation of estimated glomerular 

filtration rate           >                         NRG        

 

                    Serum or plasma glucose measurement (mass/volume)           

98 mg/dL            

        

 

                    Serum or plasma calcium measurement (mass/volume)           

9.3 mg/dL           

8.5-10.1        

 

                          Serum or plasma total bilirubin measurement (mass/volu

me)           0.4 mg/dL   

                                        0.1-1.0        

 

                                        Serum or plasma alkaline phosphatase haylie

surement (enzymatic activity/volume)    

                          98 U/L                            

 

                                        Serum or plasma aspartate aminotransfera

se measurement (enzymatic 

activity/volume)           52 U/L                    5-34        

 

                                        Serum or plasma alanine aminotransferase

 measurement (enzymatic activity/volume)

                          72 U/L                    0-55        

 

                    Serum or plasma protein measurement (mass/volume)           

8.0 g/dL            

6.4-8.2        

 

                    Serum or plasma albumin measurement (mass/volume)           

4.0 g/dL            

3.2-4.5        

 

                                        Serum or plasma creatine kinase measurem

ent (enzymatic activity/volume) - 

18 14:17         

 

                                        Serum or plasma creatine kinase measurem

ent (enzymatic activity/volume)         

                          235 U/L                           

 

                                        Serum aldolase measurement - 18 14

:17         

 

                    Serum aldolase measurement           6.8 U/L             1.5

-8.1        

 

                                        Creatine kinase isoforms assay - 

8 14:17         

 

                                        Serum or plasma creatine kinase MB measu

rement (enzymatic activity/volume)      

                          4 %                       0-4        

 

                                        Serum or plasma creatine kinase BB measu

rement (enzymatic activity/volume) by 

electrophoresis           0 %                       0-0        

 

                                        Serum or plasma creatine kinase MM fract

ion/total creatine kinase enzymatic 

ratio                     96 %                              

 

                                        Cerebrospinal fluid creatine kinase nguyễn

urement (enzymatic activity/volume)     

                          235 U/L                           

 

                    Macroenzyme creatine kinase (CK) type 1 measurement         

  0 %                 0-0 

       

 

                    Macroenzyme creatine kinase (CK) type 2 measurement         

  0 %                 0-0 

       

 

                                        Complete blood count (CBC) with automate

d white blood cell (WBC) differential - 

19 16:43         

 

                          Blood leukocytes automated count (number/volume)      

     3.9 10*3/uL          

                                        4.3-11.0        

 

                          Blood erythrocytes automated count (number/volume)    

       4.11 10*6/uL       

                                        4.35-5.85        

 

                    Venous blood hemoglobin measurement (mass/volume)           

12.1 g/dL           

11.5-16.0        

 

                    Blood hematocrit (volume fraction)           37 %           

     35-52        

 

                    Automated erythrocyte mean corpuscular volume           90 [

foz_us]           

80-99        

 

                                        Automated erythrocyte mean corpuscular h

emoglobin (mass per erythrocyte)        

                          29 pg                     25-34        

 

                                        Automated erythrocyte mean corpuscular h

emoglobin concentration measurement 

(mass/volume)             33 g/dL                   32-36        

 

                    Automated erythrocyte distribution width ratio           14.

5 %              10.0-

14.5        

 

                    Automated blood platelet count (count/volume)           349 

10*3/uL           

130-400        

 

                          Automated blood platelet mean volume measurement      

     8.8 [foz_us]         

                                        7.4-10.4        

 

                    Automated blood neutrophils/100 leukocytes           46 %   

             42-75       

 

 

                    Automated blood lymphocytes/100 leukocytes           42 %   

             12-44       

 

 

                    Blood monocytes/100 leukocytes           9 %                

 0-12        

 

                    Automated blood eosinophils/100 leukocytes           3 %    

             0-10        

 

                    Automated blood basophils/100 leukocytes           1 %      

           0-10        

 

                    Blood neutrophils automated count (number/volume)           

1.8 10*3            

1.8-7.8        

 

                    Blood lymphocytes automated count (number/volume)           

1.6 10*3            

1.0-4.0        

 

                    Blood monocytes automated count (number/volume)           0.

3 10*3            

0.0-1.0        

 

                    Automated eosinophil count           0.1 10*3/uL           0

.0-0.3        

 

                    Automated blood basophil count (count/volume)           0.0 

10*3/uL           

0.0-0.1        

 

                                        Comprehensive metabolic panel - 19

 16:43         

 

                          Serum or plasma sodium measurement (moles/volume)     

      139 mmol/L          

                                        135-145        

 

                          Serum or plasma potassium measurement (moles/volume)  

         3.8 mmol/L       

                                        3.6-5.0        

 

                          Serum or plasma chloride measurement (moles/volume)   

        106 mmol/L        

                                                

 

                    Carbon dioxide           24 mmol/L           21-32        

 

                          Serum or plasma anion gap determination (moles/volume)

           9 mmol/L       

                                        5-14        

 

                          Serum or plasma urea nitrogen measurement (mass/volume

)           9 mg/dL       

                                        7-18        

 

                          Serum or plasma creatinine measurement (mass/volume)  

         0.60 mg/dL       

                                        0.60-1.30        

 

                    Serum or plasma urea nitrogen/creatinine mass ratio         

  15                  NRG 

       

 

                                        Serum or plasma creatinine measurement w

ith calculation of estimated glomerular 

filtration rate           >                         NRG        

 

                    Serum or plasma glucose measurement (mass/volume)           

94 mg/dL            

        

 

                    Serum or plasma calcium measurement (mass/volume)           

9.6 mg/dL           

8.5-10.1        

 

                          Serum or plasma total bilirubin measurement (mass/volu

me)           0.5 mg/dL   

                                        0.1-1.0        

 

                                        Serum or plasma alkaline phosphatase haylie

surement (enzymatic activity/volume)    

                          104 U/L                           

 

                                        Serum or plasma aspartate aminotransfera

se measurement (enzymatic 

activity/volume)           35 U/L                    5-34        

 

                                        Serum or plasma alanine aminotransferase

 measurement (enzymatic activity/volume)

                          32 U/L                    0-55        

 

                    Serum or plasma protein measurement (mass/volume)           

8.0 g/dL            

6.4-8.2        

 

                    Serum or plasma albumin measurement (mass/volume)           

3.9 g/dL            

3.2-4.5        

 

                    CALCIUM CORRECTED           9.7 mg/dL           8.5-10.1    

    

 

                                        Serum or plasma C reactive protein measu

rement (mass/volume) - 19 16:43   

      

 

                          Serum or plasma C reactive protein measurement (mass/v

olume)           1.01 

mg/dL                                   0.00-0.50        

 

                                        Erythrocyte sedimentation rate by harmeet

gren method - 19 16:43         

 

                    Erythrocyte sedimentation rate by westergren method         

  56 mm               0-

30        

 

                                        THIOPURINE METHYLTRANSFERASE A - 

9 16:00         

 

                    TPMT ACT            21.6 u[iU]/mL           24.0-44.0       

 

 

                                        Complete blood count (CBC) with automate

d white blood cell (WBC) differential - 

19 17:05         

 

                          Blood leukocytes automated count (number/volume)      

     3.5 10*3/uL          

                                        4.3-11.0        

 

                          Blood erythrocytes automated count (number/volume)    

       4.01 10*6/uL       

                                        4.35-5.85        

 

                    Venous blood hemoglobin measurement (mass/volume)           

11.2 g/dL           

11.5-16.0        

 

                    Blood hematocrit (volume fraction)           36 %           

     35-52        

 

                    Automated erythrocyte mean corpuscular volume           89 [

foz_us]           

80-99        

 

                                        Automated erythrocyte mean corpuscular h

emoglobin (mass per erythrocyte)        

                          28 pg                     25-34        

 

                                        Automated erythrocyte mean corpuscular h

emoglobin concentration measurement 

(mass/volume)             32 g/dL                   32-36        

 

                    Automated erythrocyte distribution width ratio           15.

5 %              10.0-

14.5        

 

                    Automated blood platelet count (count/volume)           403 

10*3/uL           

130-400        

 

                          Automated blood platelet mean volume measurement      

     9.0 [foz_us]         

                                        7.4-10.4        

 

                    Automated blood neutrophils/100 leukocytes           39 %   

             42-75       

 

 

                    Automated blood lymphocytes/100 leukocytes           46 %   

             12-44       

 

 

                    Blood monocytes/100 leukocytes           11 %               

 0-12        

 

                    Automated blood eosinophils/100 leukocytes           3 %    

             0-10        

 

                    Automated blood basophils/100 leukocytes           1 %      

           0-10        

 

                    Blood neutrophils automated count (number/volume)           

1.4 10*3            

1.8-7.8        

 

                    Blood lymphocytes automated count (number/volume)           

1.6 10*3            

1.0-4.0        

 

                    Blood monocytes automated count (number/volume)           0.

4 10*3            

0.0-1.0        

 

                    Automated eosinophil count           0.1 10*3/uL           0

.0-0.3        

 

                    Automated blood basophil count (count/volume)           0.0 

10*3/uL           

0.0-0.1        

 

                                        Erythrocyte sedimentation rate by harmeet

gren method - 19 17:05         

 

                    Erythrocyte sedimentation rate by westergren method         

  73 mm               0-

30        

 

                                        Comprehensive metabolic panel - 19

 17:05         

 

                          Serum or plasma sodium measurement (moles/volume)     

      136 mmol/L          

                                        135-145        

 

                          Serum or plasma potassium measurement (moles/volume)  

         4.0 mmol/L       

                                        3.6-5.0        

 

                          Serum or plasma chloride measurement (moles/volume)   

        109 mmol/L        

                                                

 

                    Carbon dioxide           20 mmol/L           21-32        

 

                          Serum or plasma anion gap determination (moles/volume)

           7 mmol/L       

                                        5-14        

 

                          Serum or plasma urea nitrogen measurement (mass/volume

)           23 mg/dL      

                                        7-18        

 

                          Serum or plasma creatinine measurement (mass/volume)  

         0.59 mg/dL       

                                        0.60-1.30        

 

                    Serum or plasma urea nitrogen/creatinine mass ratio         

  39                  NRG 

       

 

                                        Serum or plasma creatinine measurement w

ith calculation of estimated glomerular 

filtration rate           >                         NRG        

 

                    Serum or plasma glucose measurement (mass/volume)           

99 mg/dL            

        

 

                    Serum or plasma calcium measurement (mass/volume)           

9.3 mg/dL           

8.5-10.1        

 

                          Serum or plasma total bilirubin measurement (mass/volu

me)           0.3 mg/dL   

                                        0.1-1.0        

 

                                        Serum or plasma alkaline phosphatase haylie

surement (enzymatic activity/volume)    

                          131 U/L                           

 

                                        Serum or plasma aspartate aminotransfera

se measurement (enzymatic 

activity/volume)           31 U/L                    5-34        

 

                                        Serum or plasma alanine aminotransferase

 measurement (enzymatic activity/volume)

                          24 U/L                    0-55        

 

                    Serum or plasma protein measurement (mass/volume)           

8.8 g/dL            

6.4-8.2        

 

                    Serum or plasma albumin measurement (mass/volume)           

3.8 g/dL            

3.2-4.5        

 

                    CALCIUM CORRECTED           9.5 mg/dL           8.5-10.1    

    

 

                                        Serum or plasma creatine kinase measurem

ent (enzymatic activity/volume) - 

19 17:05         

 

                                        Serum or plasma creatine kinase measurem

ent (enzymatic activity/volume)         

                          106 U/L                           

 

                                        Serum or plasma C reactive protein measu

rement (mass/volume) - 19 17:05   

      

 

                          Serum or plasma C reactive protein measurement (mass/v

olume)           1.53 

mg/dL                                   0.00-0.50        



                                                



Encounters

      



                ACCT No.           Visit Date/Time           Discharge          

 Status         

             Pt. Type           Provider           Facility           Loc./Unit 

          

Complaint        

 

                    [id number]           10/24/2019 12:47:00           

020 00:01:00        

                DIS             Outpatient           LYNSEY SANTILLAN [facility]           ONC                                

 

                    [id number]           2019 16:52:00            23:59:59        

                CLS             Outpatient           ZARA TORO MD          

 [facility]           LAB                       SCLERODERMA        

 

                    [id number]           2019 10:29:00            00:01:00        

                DIS             Outpatient           LYNSEY SANTILLAN [facility]           ONC                                

 

                    [id number]           2019 15:32:00            23:59:59        

                CLS             Outpatient           ZARA TORO MD          

 [facility]           LAB                       LABS        

 

                    [id number]           2019 16:19:00            23:59:59        

                CLS             Outpatient           CORTEZ MINA, ZARA THOMSON          

 [facility]           LAB                       HIGH RISK MEDICATION   

     

 

                    [id number]           2019 09:42:00            00:01:00        

                DIS             Outpatient           LYNSEY SANTILLAN           V

[facility]           ONC                                

 

                    [id number]           2019 15:22:00            23:59:59        

                CLS             Preadmit           ROBINA QUEEN APRN     

      [facility] 

[facility]           RT                        RESTRICTIVE FERMIN

G DISEASE    

    

 

                    [id number]           2018 09:57:00            23:59:59        

                CLS             Outpatient           LYNSEY SANTILLAN

[facility]           RAD                                

 

                    [id number]           2018 15:33:00           

018 23:59:59        

                CLS             Preadmit           ROBINA QUEEN APRN     

      V[facility] 

[facility]           RAD                       DECREASED DIFFU

JF 

CAPACITY OF LUNG        

 

                    [id number]           2018 15:26:00           

018 23:59:59        

                CLS             Outpatient           ROBINA QUEEN APRN   

        [facility] 

[facility]           RAD                       DECREASED DIFFU

JF 

CAPACITY OF LUNG        

 

                    [id number]           2018 07:46:00            23:59:59        

                CLS             Preadmit           CORTEZ MINA, ZARA THOMSON           V

[facility]           RAD                       INTERSITIAL LUNG DISEAS

E        

 

                    [id number]           09/10/2018 11:47:00           09/10/2

018 23:59:59        

                CLS             Outpatient           YENY DINH DO S   

        [facility] 

[facility]           CARD                      SYSTEMIC SCLERO

DERMA      

  

 

                    [id number]           2018 13:56:00            23:59:59        

                CLS             Outpatient           JOSH MINA, WILLIAM NEWELL         

  [facility]           LAB                       R94.5        

 

                    [id number]           2018 11:55:00           

018 23:59:59        

                CLS             Preadmit           CIARA ARCHER MD          

 [facility]           WOUNDCARE                          

 

                    [id number]           2018 15:28:00            23:59:59        

                CLS             Outpatient           CIARA ARCHER MD        

   [facility]           LAB                       L97.312        

 

                    [id number]           2018 13:57:00            23:59:59        

                CLS             Outpatient           CIARA ARCHER MD        

   [facility]           WOUNDCARE                          

 

                    [id number]           2018 14:31:00            23:59:59        

                CLS             Outpatient           CIARA ARCHER MD        

   [facility]           [facility]                          

 

                    [id number]           2018 14:54:00            23:59:59        

                CLS             Outpatient           CIARA ARCHER MD        

   [facility]           [facility]                          

 

                    [id number]           2018 08:10:00            23:59:59        

                CLS             Outpatient           CIARA ARCHER MD        

   [facility]           [facility]                          

 

                    [id number]           2018 00:21:00            23:59:59        

                CLS             Preadmit           FABRICIO BOYER MD   

        Via 

[facility]           LAB                       M34.9        

 

                    [id number]           10/06/2017 12:35:00            00:01:00        

                DIS             Outpatient           FABRICIO BOYER MD 

          V[facility] 

[facility]           LAB                       M34.9

## 2020-03-18 NOTE — NUR
THIS RN TO BEDSIDE TO ATTEMPT PICC PLACEMENT

INITIAL XRAY POST PLACEMENT SHOWS PICC ADVANCING INTO IJ.

ATTEMPT TO REPOSITION UNSUCCESSFUL, UNABLE TO ADVANCE.

ATTEMPT TO REPLACE AT SAME INSERTION SITE UNSUCCESSFUL.

ATTEMPT TO PLACE MIDLINE UNSUCCESSFUL.

## 2020-03-18 NOTE — PULMONARY CONSULTATION
History of Present Illness


History of Present Illness


Date Seen by Provider:  Mar 18, 2020


Time Seen by Provider:  12:53


Date of Admission





History of Present Illness


60yo wtih hx of scleroderma presented to ED via EMS secondary to worsening cough

and SOB. Pt also complains of abdominal pain and generalized fatigue. Denies 

nausea or vomiting. Last bowel movement was yesterday. CXR appears no acute 

process.





Allergies and Home Medications


Allergies


Coded Allergies:  


     Penicillins (Verified  Allergy, Unknown, 3/18/20)





Past Medical-Social-Family Hx


Past Med/Social Hx:  Reviewed Nursing Past Med/Soc Hx


Patient Social History


Alcohol Use:  Denies Use


Recreational Drug Use:  No


Smoking Status:  Never a Smoker


Recent Foreign Travel:  No


Contact w/Someone Who Travel:  No


Recent Infectious Disease Expo:  No





Past Medical History


Surgeries:  No


Cardiac:  Yes


Hypertension


Genitourinary:  No


Gastrointestinal:  Yes


Gastroesophageal Reflux


Integumentary:  Yes (SCLERODERMA)





Family Medical History


Reviewed Nursing Family Hx


No Pertinent Family Hx





Sepsis Event


Evaluation


Height, Weight, BMI


Height: '"


Weight: lbs. oz. kg; 22.00 BMI


Method:





Exam


Exam





Vital Signs








  Date Time  Temp Pulse Resp B/P (MAP) Pulse Ox O2 Delivery O2 Flow Rate FiO2


 


3/18/20 12:21  90 18 90/62 98 Nasal Cannula 2.00 


 


3/18/20 09:20     88 Nasal Cannula 2.00 


 


3/18/20 09:20 37.3 99 18 99/55 (70) 88 Room Air  








Height & Weight


Height: '"


Weight: lbs. oz. kg; 22.00 BMI


Method:


General Appearance:  No Apparent Distress, WD/WN


HEENT:  PERRL/EOMI, Pharynx Normal


Neck:  Non Tender, Supple


Respiratory:  Lungs Clear, Normal Breath Sounds


Cardiovascular:  Regular Rate, Rhythm, No Murmur


Capillary Refill:  Less Than 3 Seconds


Extremity:  Non Tender, Other (decreased range of motion due to scleroderma 

scarring and contractures to both upper and lower extremities)


Neurologic/Psychiatric:  Alert, Oriented x3





Results


Lab


Laboratory Tests


3/18/20 09:40











Assessment/Plan


Assessment/Plan


Influenza B 


   -Tamiflu 


Metabolic lactic acidosis


   -IVF 


Dehydration 


   -IVF 


Hypokalemia 


   -recheck labs











CATERINA MCCOY DO              Mar 18, 2020 12:57

## 2020-03-18 NOTE — CONSULTATION - SURGERY
History of Present Illness


History of Present Illness


Patient Consulted On(gil/time)


3/18/20


 20:35


Date Seen by Provider:  Mar 18, 2020


Time Seen by Provider:  17:03


History of Present Illness


Consult requested for central line placement by Dr. Garcia.





Patient is a 59 year old female who has been sick for about a week.  Having 

weakness and increasing shortness of  air.  Not feeling well.  She has sc

leroderma.  Patient dehydrated and a couple attempts of a PICC line were made 

and unable to get PICC line placed successfully.


Patient positive for Flu B and blood pressure in 80's systolic.





Allergies and Home Medications


Allergies


Coded Allergies:  


     Penicillins (Verified  Allergy, Unknown, 3/18/20)





Home Medications


Lisinopril 20 Mg Tablet, 20 MG PO DAILY, (Reported)


Metoprolol Succinate 25 Mg Tab.er.24h, 25 MG PO DAILY, (Reported)


Omeprazole Magnesium 20 Mg Tablet.dr, 20 MG PO DAILY, (Reported)





Patient Home Medication List


Home Medication List Reviewed:  Yes





Past Medical-Social-Family Hx


Patient Social History


Alcohol Use:  Denies Use


Recreational Drug Use:  No


Smoking Status:  Never a Smoker


Recent Foreign Travel:  No


Contact w/Someone Who Travel:  No


Recent Infectious Disease Expo:  No





Immunizations Up To Date


Date of Pneumonia Vaccine:  Mar 18, 2018


Date of Influenza Vaccine:  Oct 1, 2019





Surgeries


History of Surgeries:  No





Cardiovascular


History of Cardiac Disorders:  Yes


Cardiac Disorders:  Hypertension





Genitourinary


History of Genitourinary Disor:  No





Gastrointestinal


History of Gastrointestinal Di:  Yes


Gastrointestinal Disorders:  Gastroesophageal Reflux





HEENT


History of HEENT Disorders:  No





Cancer


History of Cancer:  No





Integumentary


History of Skin or Integumenta:  Yes (SCLERODERMA)





Reviewed Nursing Assessment


Reviewed/Agree w Nursing PMH:  Yes





Family Medical History


Significant Family History:  No Pertinent Family Hx





Review of Systems-General


Constitutional:  fever, malaise, weakness


EENTM:  No ear pain, No blurred vision


Respiratory:  short of breath


Cardiovascular:  no symptoms reported; No chest pain


Gastrointestinal:  no symptoms reported; No abdominal pain, No dysphagia, No 

heartburn


Genitourinary:  no symptoms reported


Musculoskeletal:  muscle pain


Skin:  other (scleroderma)


Psychiatric/Neurological:  Denies Depressed, Denies Emotional Problems





Physical Exam-General Problems


Physical Exam


Vital Signs





Vital Signs - First Documented








 3/18/20





 09:20


 


Temp 37.3


 


Pulse 99


 


Resp 18


 


B/P (MAP) 99/55 (70)


 


Pulse Ox 88


 


O2 Delivery Room Air


 


O2 Flow Rate 2.00





Capillary Refill : Less Than 3 Seconds


General Appearance:  no apparent distress


HEENT:  PERRL/EOMI, normal ENT inspection


Neck:  non-tender, supple


Respiratory:  chest non-tender, no respiratory distress, no accessory muscle use


Cardiovascular:  tachycardia


Gastrointestinal:  non tender, soft


Rectal:  deferred


Back:  no CVA tenderness


Extremities:  non-tender, normal inspection


Neurologic/Psychiatric:  CNs II-XII nml as tested, alert, normal mood/affect, 

oriented x 3


Skin:  other (skin changes to scleroderma)


Lymphatic:  no adenopathy





Data Review


Labs


Laboratory Tests


3/18/20 09:40: 


White Blood Count 18.8H, Red Blood Count 4.44, Hemoglobin 12.6, Hematocrit 39, 

Mean Corpuscular Volume 87, Mean Corpuscular Hemoglobin 28, Mean Corpuscular 

Hemoglobin Concent 33, Red Cell Distribution Width 17.3H, Platelet Count 223, 

Mean Platelet Volume 10.9H, Neutrophils (%) (Auto) 85H, Lymphocytes (%) (Auto) 

10L, Monocytes (%) (Auto) 5, Eosinophils (%) (Auto) 0, Basophils (%) (Auto) 0, 

Neutrophils # (Auto) 15.9H, Lymphocytes # (Auto) 1.9, Monocytes # (Auto) 0.9, 

Eosinophils # (Auto) 0.0, Basophils # (Auto) 0.0, Neutrophils % (Manual) 69, 

Lymphocytes % (Manual) 7, Monocytes % (Manual) 5, Eosinophils % (Manual) 0, 

Basophils % (Manual) 0, Band Neutrophils 18, Reactive Lymphocytes 1, Toxic 

Granulation 2+, Anisocytosis SLIGHT, James Cells SLIGHT, Prothrombin Time 14.1, 

INR Comment 1.1, Activated Partial Thromboplast Time 34, Sodium Level 135, Pota

ssium Level 3.3L, Chloride Level 97L, Carbon Dioxide Level 16L, Anion Gap 22H, 

Blood Urea Nitrogen 49H, Creatinine 2.95H, Estimat Glomerular Filtration Rate 

16, BUN/Creatinine Ratio 17, Glucose Level 60*L, Lactic Acid Level 2.07*H, 

Calcium Level 9.6, Corrected Calcium 10.4H, Total Bilirubin 2.3H, Aspartate 

Amino Transf (AST/SGOT) 161H, Alanine Aminotransferase (ALT/SGPT) 74H, Alkaline 

Phosphatase 328H, Total Protein 7.7, Albumin 3.0L


3/18/20 10:40: 


3/18/20 10:49: 


Urine Color YELLOW, Urine Clarity CLOUDY, Urine pH 5.0, Urine Specific Gravity 

1.025H, Urine Protein 3+H, Urine Glucose (UA) NEGATIVE, Urine Ketones 1+H, Urine

Nitrite NEGATIVE, Urine Bilirubin 2+H, Urine Urobilinogen 1.0, Urine Leukocyte 

Esterase NEGATIVE, Urine RBC (Auto) NEGATIVE, Urine RBC NONE, Urine WBC 5-10H, 

Urine Squamous Epithelial Cells 10-25H, Urine Crystals PRESENTH, Urine Amorphous

Sediment MOD ROGER URATESH, Urine Bacteria FEWH, Urine Casts PRESENT, Urine 

Hyaline Casts 2-5H, Urine Granular Casts 2-5H, Urine Mucus MODERATEH, Urine 

Culture Indicated CULTURE PENDING


3/18/20 13:20: 


White Blood Count 15.3H, Red Blood Count 4.16L, Hemoglobin 12.0, Hematocrit 38, 

Mean Corpuscular Volume 92, Mean Corpuscular Hemoglobin 29, Mean Corpuscular 

Hemoglobin Concent 31L, Red Cell Distribution Width 17.7H, Platelet Count 164, 

Mean Platelet Volume 10.7H, Neutrophils (%) (Auto) 84H, Lymphocytes (%) (Auto) 

10L, Monocytes (%) (Auto) 5, Eosinophils (%) (Auto) 0, Basophils (%) (Auto) 0, 

Neutrophils # (Auto) 12.8H, Lymphocytes # (Auto) 1.6, Monocytes # (Auto) 0.8, 

Eosinophils # (Auto) 0.0, Basophils # (Auto) 0.1, Sodium Level 132L, Potassium 

Level 3.2L, Chloride Level 100, Carbon Dioxide Level 15L, Anion Gap 17H, Blood 

Urea Nitrogen 49H, Creatinine 2.54#H, Estimat Glomerular Filtration Rate 19, 

BUN/Creatinine Ratio 19, Glucose Level 82, Lactic Acid Level 1.41, Calcium Level

9.4, Corrected Calcium 10.7H, Total Bilirubin 1.9H, Aspartate Amino Transf (

AST/SGOT) 132H, Alanine Aminotransferase (ALT/SGPT) 62H, Alkaline Phosphatase 

268H, Total Protein 6.6, Albumin 2.4L, Phosphorus Level 2.9, Magnesium Level 

1.9, Smear Scan YES


3/18/20 18:27: Glucometer 102





Microbiology


3/18/20 Influenza Types A,B Antigen (ANG) - Final, Complete





Assessment/Plan


Influenza B


Dehydration


Acute renal failure


Scleroderma


Poor venous access








Attempts at PICC line made previously by PICC team and no success.  Patient 

discussed risks and benefits of having central line placed.  understands risks 

and benefits and wishes to proceed


will place right IJ u/s guided.


Chest x ray post placement





Clinical Quality Measures


DVT/VTE Risk/Contraindication:


Risk Factor Score Per Nursin


RFS Level Per Nursing on Admit:  4+=Very High











RICKI ÁLVAREZ DO              Mar 18, 2020 20:41

## 2020-03-18 NOTE — HISTORY & PHYSICAL
History of Present Illness


History of Present Illness


Reason for visit/HPI


1Dami is a 59 year old female with scleroderma who was brought to the emergency 

room with weakness and worsening shortness of breath.  She states she had been 

sick for about a week.  She was found to have Influenza B and to be severely 

dehydrated with a creatinine of 2.95 and was hypotensive.  It was decided to 

admit her to the ICU for IVFs and possible pressors if needed.  She was given ta

miflu in the ER.


Date of Admission


Mar 18, 2020 at 10:55


Date Seen by a Provider:  Mar 18, 2020


Time Seen by a Provider:  12:45


I consulted on this patient on


3/18/20


 16:56


Attending Physician


Faye Garcia DO


Admitting Physician


Faye Garcia DO


Consult








Allergies and Home Medications


Allergies


Coded Allergies:  


     Penicillins (Verified  Allergy, Unknown, 3/18/20)





Home Medications


Lisinopril 20 Mg Tablet, 20 MG PO DAILY, (Reported)


Metoprolol Succinate 25 Mg Tab.er.24h, 25 MG PO DAILY, (Reported)


Omeprazole Magnesium 20 Mg Tablet.dr, 20 MG PO DAILY, (Reported)





Patient Home Medication List


Home Medication List Reviewed:  Yes





Past Medical-Social-Family Hx


Past Med/Social Hx:  Reviewed Nursing Past Med/Soc Hx


Patient Social History


Marrital Status:  


Alcohol Use:  Denies Use


Recreational Drug Use:  No


Smoking Status:  Never a Smoker


Recent Foreign Travel:  No


Contact w/other who traveled:  No


Recent Infectious Disease Expo:  No





Immunizations Up To Date


Date of Pneumonia Vaccine:  Mar 18, 2018


Date of Influenza Vaccine:  Oct 1, 2019





Past Medical History


Cardiac:  Hypertension


Gastrointestinal:  Gastroesophageal Reflux





Family History


Reviewed Nursing Family Hx


No Pertinent Family Hx





Review of Systems


Constitutional:  fever, malaise, weakness


EENTM:  nose congestion


Respiratory:  short of breath


Cardiovascular:  No no symptoms reported, No see HPI, No chest pain, No edema, 

No Hx of Intervention, No palpitations, No syncope, No vascular heart diseas, No

other


Gastrointestinal:  loss of appetite


Genitourinary:  decreased output


Musculoskeletal:  muscle pain, muscle stiffness, muscle weakness


Skin:  other (vasculitis sores)


Psychiatric/Neurological:  Headache, Weakness





Physical Exam


Vital Signs





Vital Signs - First Documented








 3/18/20





 09:20


 


Temp 37.3


 


Pulse 99


 


Resp 18


 


B/P (MAP) 99/55 (70)


 


Pulse Ox 88


 


O2 Delivery Room Air


 


O2 Flow Rate 2.00





Capillary Refill : Less Than 3 Seconds


Height, Weight, BMI


Height: '"


Weight: lbs. oz. kg; 22.00 BMI


Method:


General Appearance:  Moderate Distress


HEENT:  Other (Mucous membranes dry)


Neck:  Supple


Respiratory:  Lungs Clear


Cardiovascular:  Gallop/S4, Tachycardia


Gastrointestinal:  Normal Bowel Sounds, Non Tender, Soft


Rectal:  Deferred


Back:  No CVA Tenderness


Extremity:  Non Tender, No Calf Tenderness, No Pedal Edema


Neurologic/Psychiatric:  Alert, Oriented x3


Skin:  Warm/Dry, Other (sores to hands and bilateral ankles/feet)





Assessment/Plan


Assessment and Plan


1.  Influenza B--treat with tamiflu


2.  Severe Dehydration with Acute Renal Failure--aggressive IVF rehydration and 

monitor BUN/Cr


3.  Hypotension--aggressive hydration and pressors if needed


4.  Metabolic Acidosis with Electrolyte Imbalance--Hydrate and replace 

electrolytes as needed


5.  Bacturia--culture urine and cover with maxipime until cultures final


6.  Scleroderma with Vasculitis--patient has not been on any immunosuppressive 

therapy or steroids for at least 3-4mos





Admission Diagnosis


Admission Status:  Inpatient Order (span 2 midnights)


Reason for Inpatient Admission:  


Will need at least 48hrs of IVFs and close monitoring





Clinical Quality Measures


DVT/VTE Risk/Contraindication:


Risk Factor Score Per Nursin


RFS Level Per Nursing on Admit:  4+=Very High











FAYE GARCIA DO        Mar 18, 2020 17:01

## 2020-03-18 NOTE — DIAGNOSTIC IMAGING REPORT
INDICATION: PICC line placement.



TECHNIQUE: Frontal chest obtained at 2:43 p.m. and compared to

same day at 9:32 a.m.



FINDINGS: Heart and mediastinal silhouette are normal in

appearance. Lungs are clear. There is no pneumothorax or pleural

fluid.



There is a PICC line in place from the left arm with catheter

going up the left internal jugular vein, above the level of the

film.



IMPRESSION: PICC line in place from left arm with tip going up

the left internal jugular vein, above the image level. Recommend

repositioning and follow-up.



Dictated by: 



  Dictated on workstation # [id number]

## 2020-03-18 NOTE — XMS REPORT
CCD document using C-CDA

                             Created on: 2019



Karyna Farris

External Reference #: [id number]

: 1960

Sex: Female



Demographics





                          Address                   [address] [place]  [id number]

 

                          Home Phone                [phone]

 

                          Preferred Language        Unknown

 

                          Marital Status            

 

                          Sabianist Affiliation     Unknown

 

                          Race                      White

 

                          Ethnic Group              Not  or 





Author





                          Author                    Karyna Summers D.O.

 

                          Organization              [facility]

 

                          Address                   [address] [place]  [address]



 

                          Phone                     [phone]







Care Team Providers





                    Care Team Member Name Role                Phone

 

                          PP                        Unavailable

 

                          CCM                       Unavailable







Summary Purpose

Interface Exchange



Insurance Providers





             Payer name   Policy type / Coverage type Covered party ID Effective

 Begin Date 

Effective End Date

 

             [employer] Commercial [employer] [id number]    2018     Un

known







Family history





Mother



                          Diagnosis                 Age At Onset

 

                          Diabetes mellitus Type 2  Unknown

 

                          Heart disease             Unknown







Father



                          Diagnosis                 Age At Onset

 

                          Coronary Artery Disease(CAD) Unknown

 

                          Diabetes mellitus Type 2  Unknown







Social History





                Social History Element Codes           Description     Effective

 Dates

 

                Marital status  Unknown                  2018

 

                Number of children Unknown         2               2018

 

                Employment      Unknown         Currently unemployed 2018

 

                Tobacco history SNOMED CT: 010122148 Has never smoked or chewed 

tobacco 

2018

 

                Alcohol history SNOMED CT: 795185338 Never drinks alcohol 2018

 

                Has the patient ever used illegal drugs? Unknown         Has nev

er used illegal drugs 

2018







Allergies, Adverse Reactions, Alerts





           Substance  Reaction   Codes      Entered Date Inactivated Date Status

 

           * NO KNOWN ENVIRONMENTAL ALLERGIES            Unknown    2018 N

o Inactive Date Active

 

           PENICILLINS reaction   Unknown    2018 No Inactive Date Active

 

           * NO KNOWN FOOD ALLERGIES            Unknown    2018 No Inactiv

e Date Active







Problems





                Condition       Codes           Effective Dates Condition Status

 

                          Chronic iridocyclitis, right eye ICD-9: 364.10

ICD-10: H20.11            2018                Active

 

                          Encounter for general adult medical examination withou

t abnormal findings ICD-9:

V70.0

ICD-10: Z00.00            10/24/2019                Active

 

                          Essential (primary) hypertension ICD-9: 401.9

ICD-10: I10               2018                Active

 

                          Progressive systemic sclerosis ICD-9: 710.1

ICD-10: M34.0             2018                Active

 

                          Otalgia, left ear         ICD-9: 388.70

ICD-10: H92.02            01/15/2019                Active

 

                          Otorrhagia, left ear      ICD-9: 388.69

ICD-10: H92.22            01/15/2019                Active

 

                          Unspecified perforation of tympanic membrane, left ear

 ICD-9: 384.20

ICD-10: H72.92            01/15/2019                Active

 

                          FLU VACCINE               ICD-9: V04.81

ICD-10: Z23               10/16/2018                Active

 

                          PNEUMOCOCCAL VACCINE      ICD-9: V03.82

ICD-10: Z23               10/16/2018                Active

 

                          Pain in right ankle and joints of right foot ICD-9: 71

9.47

ICD-10: M25.571           09/10/2018                Active

 

                          Tachycardia, unspecified  ICD-9: 785.0

ICD-10: R00.0             09/10/2018                Active

 

                Hypertension    Unknown         2018      Active

 

                          Abnormal levels of other serum enzymes ICD-9: 790.5

ICD-10: R74.8             2018                Active

 

                          Non-pressure chronic ulcer of left ankle with fat laye

r exposed ICD-9: 707.13

ICD-10: L97.322           2018                Active

 

                          Non-pressure chronic ulcer of right ankle with fat lay

er exposed ICD-9: 707.13

ICD-10: L97.312           2018                Active







Medications





          Medication Codes     Instructions Start Date Stop Date Status    Fill 

Instructions

 

                    prednisone 10 mg tablet RxNorm: 433645      Take 1 tablet by

 mouth twice daily for 3 

days then 1 daily for four days 2019      Active          

 

 

                    prednisone 10 mg tablet RxNorm: 694334      1 Tablet(s) Oral

 two times a day then 1 

daily for four days 2019      Inactive         

 

             lisinopril 20 mg tablet RxNorm: 958185 1 Tablet(s) Oral QD 10/28/20

19   2020

                          Active                     

 

                    metoprolol succinate ER 25 mg tablet,extended release 24 hr 

RxNorm: 069968      TAKE 

1 TABLET DAILY  10/28/2019      No Stop Date    Active           

 

                    metoprolol succinate ER 25 mg tablet,extended release 24 hr 

RxNorm: 890941      1 

Tablet(s) Oral QD 10/24/2019      No Stop Date    Active           

 

             meloxicam 15 mg tablet RxNorm: 590912 1 Tablet(s) Oral QD 10/24/201

9   No Stop 

Date                      Active                     

 

             Benadryl Allergy 25 mg tablet RxNorm: 6353334 1 Tablet(s) Oral QD 1

   No 

Stop Date                 Active                     

 

             lisinopril 20 mg tablet RxNorm: 215919 1 Tablet(s) Oral QD 10/24/20

19   10/27/2019

                          Inactive                   

 

                clindamycin HCl 300 mg capsule RxNorm: 856618  1 Capsule(s) Oral

 two times a day 

10/24/2019          10/31/2019          Inactive             

 

                meloxicam submicronized 10 mg capsule RxNorm: 5076259 1 Capsule(

s) Oral QD 

10/24/2019          10/24/2019          Inactive             

 

                    ciprofloxacin 0.2 % ear drops in a dropperette RxNorm: 56554

6      3 Drop(s) otic 

(ear) BID       01/15/2019      2019      Inactive         

 

                    CellCept 200 mg/mL oral suspension RxNorm: 554083      TAKE 

5 ML BY MOUTH TWICE DAILY

                2018      10/23/2019      Inactive         

 

                    metoprolol succinate ER 25 mg tablet,extended release 24 hr 

RxNorm: 870318      1 

Tablet(s) PO QD replaces bisoprolol 2018      Inactive    

     

 

                    metoprolol succinate ER 25 mg tablet,extended release 24 hr 

RxNorm: 684115      1 

Tablet(s) PO QD replaces bisoprolol 2018      Inactive    

     

 

             diclofenac potassium 50 mg tablet RxNorm: 243145 1 Tablet(s) PO QID

 10/16/2018   

10/25/2018                Inactive                   

 

             bisoprolol fumarate 10 mg tablet RxNorm: 885738 1 Tablet(s) PO QD 1

2018                Inactive                   

 

                CellCept 200 mg/mL oral suspension RxNorm: 172732  5 Milliliter(

s) PO BID 

2018          10/24/2018          Inactive             

 

                CellCept 200 mg/mL oral suspension RxNorm: 906901  5 Milliliter(

s) PO BID 

2018          Inactive             

 

             bisoprolol fumarate 10 mg tablet RxNorm: 878606 1 Tablet(s) PO QD 0

9/10/2018   

2018                Inactive                   

 

                CellCept 200 mg/mL oral suspension RxNorm: 602459  5 Milliliter(

s) PO BID 

2018          Inactive             

 

                lisinopril 40 mg tablet RxNorm: 305764  1 Tablet(s) PO QD replac

es 10mg daily 

2018          10/28/2019          Inactive            May change to 20mg p

o BID if pill is too big for 

patient to swallow or more cost effective

 

                CellCept 200 mg/mL oral suspension RxNorm: 184449  5 Milliliter(

s) PO BID 

2018          Inactive             

 

                    prednisone 10 mg tablet RxNorm: 503067      1 Tablet(s) PO B

ID for 1 week the 1 po 

daily for 1 week 2018      Inactive         

 

           famotidine 20 mg tablet RxNorm: 195061 1 Tablet(s) PO QHS No Start Da

te            Active

                                         

 

             lisinopril 10 mg tablet RxNorm: 740204 1 Tablet(s) PO QD No Start D

ate 

2018                Inactive                   

 

             ibuprofen 200 mg tablet RxNorm: 134121 Tablet(s) PO as needed No St

art Date 

10/15/2018                Inactive                   

 

           Aleve 220 mg tablet RxNorm: 388094 2 Tablet(s) PO BID No Start Date 0

2019 

Inactive                                 

 

             Benadryl 25 mg capsule RxNorm: 6752554 1 Capsule(s) PO QHS No Start

 Date 

2019                Inactive                   

 

             lisinopril 40 mg tablet RxNorm: 333669 1/2 Tablet(s) PO QD No Start

 Date 

10/23/2019                Inactive                   







Medication Administered

No Medication Administered data



Immunizations





                Vaccine         Codes           Date            Status

 

                Influenza       CVX: 141        10/16/2018      Complete

 

                Pneumococcal    CVX: 133        10/16/2018      Complete







Results





          Observation Observation Code Item      Item Code Result    Date      S

[facility] Location

 

          URIC ACID 13951     URIC ACID           3.2 mg/dL 09/10/2018 Unknown

 

          COMPLETE BLOOD COUNT [id number]   WBC                 7.2 10e9/L 09/10/20

18 Unknown

 

          COMPLETE BLOOD COUNT [id number]   RBC                 3.81 10e12/L 09/10/

2018 Unknown

 

          COMPLETE BLOOD COUNT [id number]   HEMOGLOBIN           11.1 g/dL 09/10/20

18 Unknown

 

          COMPLETE BLOOD COUNT [id number]   HEMATOCRIT           35.5 %    09/10/20

18 Unknown

 

          COMPLETE BLOOD COUNT [id number]   MCV                 93.2 fL   09/10/201

8 Unknown

 

          COMPLETE BLOOD COUNT [id number]   MCH                 29.1 pg   09/10/201

8 Unknown

 

          COMPLETE BLOOD COUNT [id number]   MCHC                31.3 g/dL 09/10/201

8 Unknown

 

          COMPLETE BLOOD COUNT [id number]   PLATELET COUNT           473 10e9/L 09/

10/2018 Unknown

 

          COMPLETE BLOOD COUNT [id number]   Mean Plt Volume           9.4 fL    09/

10/2018 Unknown

 

          COMPLETE BLOOD COUNT [id number]   Neut Auto           63.0 %    09/10/201

8 Unknown

 

          COMPLETE BLOOD COUNT [id number]   Lymph Auto           25.8 %    09/10/20

18 Unknown

 

          COMPLETE BLOOD COUNT [id number]   Mono Auto           8.8 %     09/10/201

8 Unknown

 

          COMPLETE BLOOD COUNT [id number]   RDW                 14.9 %    09/10/201

8 Unknown

 

          COMPLETE BLOOD COUNT [id number]   Eos Auto            2.1 %     09/10/201

8 Unknown

 

          COMPLETE BLOOD COUNT [id number]   Baso Auto           0.3 %     09/10/201

8 Unknown

 

          COMPLETE BLOOD COUNT [id number]   Neutrophil Abs           4.54 10e9/L 09

/10/2018 Unknown

 

          COMPLETE BLOOD COUNT [id number]   Lymphocyte Abs           1.86 10e9/L 09

/10/2018 Unknown

 

          COMPLETE BLOOD COUNT [id number]   Monocyte Abs           0.63 10e9/L  Unknown

 

          COMPLETE BLOOD COUNT [id number]   Eosinophil Abs           0.15 10e9/L 09

/10/2018 Unknown

 

          COMPLETE BLOOD COUNT [id number]   Basophil Abs           0.02 10e9/L  Unknown

 

          COMPLETE BLOOD COUNT [id number]   RDW-SD              48.6 fL   09/10/201

8 Unknown

 

          C-REACTIVE PROTEIN (CRP) QUANT 20298     C-Reactive Prot           8.5

 mg/dL 09/10/2018 

Unknown

 

          COMPREHENSIVE METABOLIC 54963     AST                 29 U/L    09/10/

2018 Unknown

 

          COMPREHENSIVE METABOLIC 41725     ALT                 46 U/L    09/10/

2018 Unknown

 

          COMPREHENSIVE METABOLIC 64025     BUN                 14 mg/dL  09/10/

2018 Unknown

 

          COMPREHENSIVE METABOLIC 76634     ALBUMIN             3.6 g/dL  09/10/

2018 Unknown

 

          COMPREHENSIVE METABOLIC 90285     CHLORIDE            105 mmol/L 09/10

/2018 Unknown

 

          COMPREHENSIVE METABOLIC 65391     Bili Total           0.4 mg/dL 09/10

/2018 Unknown

 

          COMPREHENSIVE METABOLIC 56816     ALK PHOS            161 U/L   09/10/

2018 Unknown

 

          COMPREHENSIVE METABOLIC 30263     SODIUM              140 mmol/L 09/10

/2018 Unknown

 

          COMPREHENSIVE METABOLIC 04823     CREATININE           0.41 mg/dL  Unknown

 

          COMPREHENSIVE METABOLIC 41447     CALCIUM             9.4 mg/dL 09/10/

2018 Unknown

 

          COMPREHENSIVE METABOLIC 43806     POTASSIUM           3.6 mmol/L 09/10

/2018 Unknown

 

          COMPREHENSIVE METABOLIC 50760     Total Protein           7.9 g/dL  09

/10/2018 Unknown

 

          COMPREHENSIVE METABOLIC 21748     Glucose             129 mg/dL 09/10/

2018 Unknown

 

          COMPREHENSIVE METABOLIC 39631     Bicarbonate           22 mmol/L  Unknown

 

          COMPREHENSIVE METABOLIC 87827     AGAP                13 mmol/L 09/10/

2018 Unknown

 

          ERYTHROCYTE SEDIMENTATION RATE 62712     Sed Rate            68 mm/hr 

 09/10/2018 Unknown

 

          GFR CALC  5337414   GFR Afr Amr           >60 mL/min 09/10/2018 Unknow

n

 

          GFR CALC  0705738   GFR Non Afr Amr           >60 mL/min 09/10/2018 Un

known







Procedures





                    Procedure           Codes               Date

 

                    CEFTRIAXONE SODIUM INJECTION CPT-4:         01/15/2019

 

                    THER/PROPH/DIAG INJ SC/IM CPT-4: 22868        01/15/2019

 

                    IIV4 VACCINE 3 YRS+ IM AND UP CPT-4: 01288        10/16/2018

 

                    PNEUMOCOCCAL VACC 13 SEAN IM CPT-4: 77367        10/16/2018

 

                    IMMUNIZATION ADMIN  CPT-4: 11046        10/16/2018

 

                    IMMUNIZATION ADMIN EACH ADD CPT-4: 28195        10/16/2018

 

                    ROUTINE VENIPUNCTURE CPT-4: 36981        09/10/2018

 

                    COMPREHEN METABOLIC PANEL CPT-4: 46052        09/10/2018

 

                    COMPLETE CBC W/AUTO DIFF WBC CPT-4: 05072        09/10/2018

 

                    RBC SED RATE AUTOMATED CPT-4: 13092        09/10/2018

 

                    ASSAY OF BLOOD/URIC ACID CPT-4: 86579        09/10/2018

 

                    C-REACTIVE PROTEIN  CPT-4: 21072        09/10/2018

 

                    THER/PROPH/DIAG INJ SC/IM CPT-4: 54783        2018

 

                    TRIAMCINOLONE ACET INJ NOS CPT-4:         2018

 

                    DEXAMETHASONE SODIUM PHOS CPT-4:         2018







Vital Signs





                          Date                      Vital

 

                    10/24/2019          Blood Pressure 1: 130/72 Code: 8480-6 Te

mperature: 36.7 (C) / 98.0 

(F)                                     Weight: 

 

                01/15/2019      Blood Pressure 1: 164/90 Code: 8480-6 Heart Rate

 1: 96 bpm 

Respiratory Rate: 16 bpm SpO2: 97%           Temperature: 36.8 (C) / 98.2 (F) We

ight: 

 

             10/16/2018   Blood Pressure 1: 144/82 Code: 8480-6 Heart Rate 1: 80

 bpm Height:      

Respiratory Rate: 20 bpm SpO2: 97%           Temperature: 36.6 (C) / 97.8 (F) We

ight: 

 

                09/10/2018      Blood Pressure 1: 126/78 Code: 8480-6 Heart Rate

 1: 112 bpm 

Respiratory Rate: 18 bpm                Temperature: 36.8 (C) / 98.2 (F)

 

             2018   Blood Pressure 1: 166/96 Code: 8480-6 Heart Rate 1: 11

6 bpm Height:      

Respiratory Rate: 20 bpm SpO2: 95%           Temperature: 37.3 (C) / 99.2 (F) We

ight: 

 

             2018   Blood Pressure 1: 134/82 Code: 8480-6 Heart Rate 1: 92

 bpm Height:      

Respiratory Rate: 20 bpm SpO2: 97%           Temperature: 36.6 (C) / 97.8 (F) We

ight: 







Functional Status

No Functional Status data



Reason For Visit





                    Reason For Visit    Effective Dates     Notes

 

                    well woman exam (40-65 years) 10/24/2019           

 

                    otalgia             01/15/2019          patient started taki

ng the whole pill of her Lisinopril since

Thanksgi

 

                    follow up           10/16/2018           

 

                    follow up           09/10/2018           

 

                    sores               2018          Discuss restarting c

ellcept

 

                    ~generic            2018          New Patient---establ

ishing visit







Encounters





             Encounter    Performer    Location     Codes        Date

 

                                        (10440) PREV VISIT EST AGE 40-64

Diagnosis: Encounter for general adult medical examination without abnormal 
findings[ICD10: Z00.00]

Diagnosis: Essential (primary) hypertension[ICD10: I10]

Diagnosis: Chronic iridocyclitis, right eye[ICD10: H20.11]

Diagnosis: Progressive systemic sclerosis[ICD10: M34.0] [facility] CPT-4: 86588              10/24/2019

 

                                        (98335) OFFICE/OUTPATIENT VISIT EST

Diagnosis: Otalgia, left ear[ICD10: H92.02]

Diagnosis: Unspecified perforation of tympanic membrane, left ear[ICD10: H72.92]

Diagnosis: Otorrhagia, left ear[ICD10: H92.22] Niurka SUMMERS [facility]            CPT-4: 92823              01/15/2019

 

                                        (47900) OFFICE/OUTPATIENT VISIT EST

Diagnosis: FLU VACCINE[ICD10: Z23]

Diagnosis: PNEUMOCOCCAL VACCINE[ICD10: Z23]

Diagnosis: Essential (primary) hypertension[ICD10: I10]

Diagnosis: Progressive systemic sclerosis[ICD10: M34.0] [facility] CPT-4: 84116              10/16/2018

 

                                        (98614) OFFICE/OUTPATIENT VISIT EST

Diagnosis: Essential (primary) hypertension[ICD10: I10]

Diagnosis: Tachycardia, unspecified[ICD10: R00.0]

Diagnosis: Progressive systemic sclerosis[ICD10: M34.0]

Diagnosis: Pain in right ankle and joints of right foot[ICD10: M25.571] 

[facility]INI S[facility] CPT-4: 18549        09/10/2018

 

                                        (98640) OFFICE/OUTPATIENT VISIT EST

Diagnosis: Essential (primary) hypertension[ICD10: I10]

Diagnosis: Progressive systemic sclerosis[ICD10: M34.0]

Diagnosis: Chronic iridocyclitis, right eye[ICD10: H20.11] [facility]y [facility] CPT-4: 32607              2018

 

                                        (23678) OFFICE/OUTPATIENT VISIT NEW

Diagnosis: Progressive systemic sclerosis[ICD10: M34.0]

Diagnosis: Non-pressure chronic ulcer of left ankle with fat layer 
exposed[ICD10: L97.322]

Diagnosis: Non-pressure chronic ulcer of right ankle with fat layer 
exposed[ICD10: L97.312]

Diagnosis: Abnormal levels of other serum enzymes[ICD10: R74.8]

Diagnosis: Essential (primary) hypertension[ICD10: I10] [facility] PRITCHETTLINE S[facility] CPT-4: 49147              2018







Plan of Care





             Planned Activity Notes        Codes        Status       Date

 

                          Visit Diagnosis Plan: Progressive systemic sclerosis D

iscussion: Following 

routinely with Dr. Taylor Discussed scleroderma center

                                        ICD-9 : 710.1

ICD-10 : M34.0

                                                    10/24/2019

 

                                        Visit Diagnosis Plan: Encounter for [facility] adult medical examination without 

abnormal findings                       Discussion: Mediterranean diet Recommend

 PT to work on 

strengthening/gait/wheelchair Will inquire about flu shot at Dr. Ovalle's 
office

                                        ICD-9 : V70.0

ICD-10 : Z00.00

                                                    10/24/2019

 

                          Visit Diagnosis Plan: Essential (primary) hypertension

 Discussion: Stable

                                        ICD-9 : 401.9

ICD-10 : I10

                                                    10/24/2019

 

                          Visit Diagnosis Plan: Chronic iridocyclitis, right eye

 Discussion: Established 

with new retinal specialist in [place]

                                        ICD-9 : 364.10

ICD-10 : H20.11

                                                    10/24/2019

 

                                        Appointment: Yeny Summers

WPtel:[phone] [address] [facility]

                                              Annual Well Visit 10/24/2019

 

                          Patient Education: clindamycin HCl- [employer] Coupon 

[id number] 

[email or web address]://[email or web address]/samplemd/resources/[email or web address]

                                        Completed           10/24/2019

 

                          Visit Diagnosis Plan: Otorrhagia, left ear Discussion:

 consulted with dr. summers. rocephin injection given in office. cipro drops prescribed to start 
tonight. referral made to dr. villalpando for evaluation of ear due to severity of 
bleeding and continuous pain. discussed wit hpatient that elevated bp most 
likely r/t severity of pain and bleeding of ear. will recheck bp once symptoms 
improve.

                                        ICD-9 : 388.69

ICD-10 : H92.22

                                                    01/15/2019

 

                          Visit Diagnosis Plan: Unspecified perforation of tympa

roger membrane, left ear 

Discussion: see other plan

                                        ICD-9 : 384.20

ICD-10 : H72.92

                                                    01/15/2019

 

                          Visit Diagnosis Plan: Otalgia, left ear Discussion: se

e other plan

                                        ICD-9 : 388.70

ICD-10 : H92.02

                                                    01/15/2019

 

                                        Appointment: Niurka Galindo

                                        [address] [place], [place] [id number]

                                              ACUTE ILLNESS   01/15/2019

 

                          Visit Diagnosis Plan: Progressive systemic sclerosis D

iscussion: Has fwup with 

rheumatology to go over test results Flu/Prevnar given

                                        ICD-9 : 710.1

ICD-10 : M34.0

                                                    10/16/2018

 

                          Visit Diagnosis Plan: Essential (primary) hypertension

 Discussion: Stable 

Continue current meds and monitor BP

                                        ICD-9 : 401.9

ICD-10 : I10

                                                    10/16/2018

 

                                        Appointment: Yeny Summers

WPtel:[phone]

                                        [address] [place], [place] [id number]

                                              FOLLOW UP       10/16/2018

 

             Patient Education: Patient Medication Summary                      

     Completed    10/16/2018

 

                                        Appointment: Yeny Summers

WPtel:[phone]

                                        [address] [place], [id number]

              10/5/18 1200--moved appt to 10/16/18 at 3:30pm (km)             

    CANCELED        10/09/2018

 

                          Visit Diagnosis Plan: Pain in right ankle and joints o

f right foot Discussion: 

Check CBC, CMP, ESR, CRP, uric acid

                                        ICD-9 : 719.47

ICD-10 : M25.571

                                                    09/10/2018

 

                          Visit Diagnosis Plan: Essential (primary) hypertension

 Discussion: Decrease 

lisinopril to 20mg daily Monitor BP

                                        ICD-9 : 401.9

ICD-10 : I10

                                                    09/10/2018

 

                          Visit Diagnosis Plan: Tachycardia, unspecified Discuss

ion: Add bisoprolol 10mg 

daily Monitor pulse

Follow Up: 1 months

                                        ICD-9 : 785.0

ICD-10 : R00.0

                                                    09/10/2018

 

                                        Appointment: Yeny Summers

WPtel:[phone]

                                        [address] [place], [place] [id number]                                              FOLLOW UP       09/10/2018

 

             Patient Education: Patient Medication Summary                      

     Completed    09/10/2018

 

                          Visit Diagnosis Plan: Progressive systemic sclerosis D

iscussion: Restart 

Cellcept Referral to new radiologist

                                        ICD-9 : 710.1

ICD-10 : M34.0

                                                    2018

 

                          Visit Diagnosis Plan: Chronic iridocyclitis, right eye

 Discussion: 

Kenalog/dexamethasone and prednisone taper

Follow Up: 2 weeks

                                        ICD-9 : 364.10

ICD-10 : H20.11

                                                    2018

 

                          Visit Diagnosis Plan: Essential (primary) hypertension

 Discussion: Increase 

lisinopril to 40mg po daily Recheck 2 weeks

                                        ICD-9 : 401.9

ICD-10 : I10

                                                    2018

 

                                        Appointment: Yeny Summers

WPtel:[phone]

                                        [id number]

                                              ACUTE ILLNESS   2018

 

             Patient Education: Patient Medication Summary                      

     Completed    2018

 

                    Care Plan: Referral Order                     SNOMED-CT : 30

[id number]

                          Pending                   2018

 

                                        Visit Diagnosis Plan: Non-pressure chron

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 

exposed                                 Discussion: Continue Bactrim and go back

 to wound care

                                        ICD-9 : 707.13

ICD-10 : L97.322

                                                    2018

 

                          Visit Diagnosis Plan: Essential (primary) hypertension

 Discussion: Stable on 

lisinopril

                                        ICD-9 : 401.9

ICD-10 : I10

                                                    2018

 

                          Visit Diagnosis Plan: Progressive systemic sclerosis D

iscussion: Following with 

rheumatology in [place] Stopped Cellcept

                                        ICD-9 : 710.1

ICD-10 : M34.0

                                                    2018

 

                          Visit Diagnosis Plan: Abnormal levels of other serum e

nzymes Discussion: 

Referral to GI as per rheumatology request

                                        ICD-9 : 790.5

ICD-10 : R74.8

                                                    2018

 

                                        Appointment: Yeny Summers

WPtel:[phone]

                                        [address] [place], [place] [id number]                                              NEW PATIENT     2018

 

             Patient Education: Patient Medication Summary                      

     Completed    2018

 

                    Care Plan: Referral Order                     SNOMED-CT : 30

[id number]

                          Pending                   2018

 

                                        Appointment: Yeny Summers

WPtel:[phone]

                                        [address] [place] [address]

[place]                                              CANCELED        2018

 

                                        Referral: Zach Gudino 

WPtel:[phone]

                                        [address] [facility]

[address]

[id number]

[place]              Referral                        Appointment Requested  

 

                                        Referral: Cameron Mendoza

WPtel:[phone]

                                        [address] [facility]

[address]

[id number]

[place]              Referral                        Appointment Requested  







Instructions

No Instructions



Medical Equipment

No Medical Equipment data



Health Concerns Section

Health Concerns data not found



Goals Section

Goals data not found



Interventions Section

Interventions data not found



Health Status Evaluations/Outcomes Section

Health Status Evaluations/Outcomes data not found



Advance Directives

No Advance Directive data

## 2020-03-18 NOTE — NUR
GAVE PATIENT A DRINK OF  DUE TO   MOUTH DRY SWOLLED WATER WITH DIFFICULT. WILL 
COME BACK AND AND GIVE PILL.

## 2020-03-18 NOTE — XMS REPORT
Continuity of Care Document

                             Created on: 2020



YOEL DARDEN

External Reference #: [id number]

: 1960

Sex: Female



Demographics





                          Address                   [address] [place] [place]  [address]

 

                          Home Phone                [phone] x

 

                          Preferred Language        Unknown

 

                          Marital Status            Unknown

 

                          Yazidism Affiliation     Unknown

 

                          Race                      Unknown

 

                          Ethnic Group              Unknown





Author





                          Organization              Unknown

 

                          Address                   Unknown

 

                          Phone                     Unavailable



              



Allergies

      



             Active           Description           Code           Type         

  Severity   

                Reaction           Onset           Reported/Identified          

 

Relationship to Patient                 Clinical Status        

 

                Yes             No Allergy Information Available           

[address]           

Drug Allergy           Unknown           N/A                             

018   

                                                             



                  



Medications

      



There is no data.                  



Problems

      



             Date Dx Coded           Attending           Type           Code    

       

Diagnosis                               Diagnosed By        

 

                10/09/2017           MERLIN MINA, FABRICIO WHARTON Ot    

          M34.9    

                          SYSTEMIC SCLEROSIS, UNSPECIFIED                    

 

                10/18/2017           MERLIN MINA, FABRICIO WHARTON Ot    

          M34.9    

                          SYSTEMIC SCLEROSIS, UNSPECIFIED                    

 

                11/15/2017           MERLIN MINA, FABRICIO WAHRTON Ot    

          M34.9    

                          SYSTEMIC SCLEROSIS, UNSPECIFIED                    

 

                2018           MERLIN MINA, FABRICIO WHARTON Ot    

          M34.9    

                          SYSTEMIC SCLEROSIS, UNSPECIFIED                    

 

                2018           MERLIN MINA, FABRICIO WHARTON Ot    

          M34.9    

                          SYSTEMIC SCLEROSIS, UNSPECIFIED                    

 

                2018           CIARA ARCHER MD           Ot           

   I70.234         

                          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              

      

 

                2018           CIARA ARCHER MD           Ot           

   I70.244         

                          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              

      

 

                2018           CIARA ARCHER MD           Ot           

   L97.312         

                          NON-PRS CHRONIC ULCER OF RIGHT ANKLE W F              

      

 

                2018           CIARA ARCHER MD           Ot           

   L97.322         

                          NON-PRESSURE CHRONIC ULCER OF LEFT ANKLE              

      

 

                2018           CIARA ARCHER MD           Ot           

   L97.412         

                          NON-PRS CHR ULCER OF RIGHT HEEL AND MIDF              

      

 

                2018           CIARA ARCHER MD           Ot           

   L97.422         

                          NON-PRS CHR ULCER OF LEFT HEEL AND MIDFO              

      

 

             2018           CIARA ARCHER MD, Ot           M34

.9           

SYSTEMIC SCLEROSIS, UNSPECIFIED                    

 

                2018           CIARA ARCHER MD           Ot           

   I70.234         

                          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              

      

 

                2018           CIARA ARCHER MD           Ot           

   I70.244         

                          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              

      

 

                2018           CIARA ARCHER MD           Ot           

   L97.312         

                          NON-PRS CHRONIC ULCER OF RIGHT ANKLE W F              

      

 

                2018           CIARA ARCHER MD           Ot           

   L97.322         

                          NON-PRESSURE CHRONIC ULCER OF LEFT ANKLE              

      

 

                2018           CIARA ARCHER MD           Ot           

   L97.412         

                          NON-PRS CHR ULCER OF RIGHT HEEL AND MIDF              

      

 

                2018           CIARA ARCHER MD           Ot           

   L97.422         

                          NON-PRS CHR ULCER OF LEFT HEEL AND MIDFO              

      

 

             2018           CIARA ARCHER MD, Ot           M34

.9           

SYSTEMIC SCLEROSIS, UNSPECIFIED                    

 

                2018           CIARA ARCHER MD           Ot           

   I70.234         

                          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              

      

 

                2018           CIARA ARCHER MD           Ot           

   I70.244         

                          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              

      

 

                2018           CIARA ARCHER MD           Ot           

   L97.312         

                          NON-PRS CHRONIC ULCER OF RIGHT ANKLE W F              

      

 

                2018           CIARA ARCHER MD           Ot           

   L97.322         

                          NON-PRESSURE CHRONIC ULCER OF LEFT ANKLE              

      

 

                2018           CIARA ARCHER MD           Ot           

   L97.412         

                          NON-PRS CHR ULCER OF RIGHT HEEL AND MIDF              

      

 

                2018           CIARA ARCHER MD           Ot           

   L97.422         

                          NON-PRS CHR ULCER OF LEFT HEEL AND MIDFO              

      

 

             2018           CIARA ARCHER MD, Ot           M34

.9           

SYSTEMIC SCLEROSIS, UNSPECIFIED                    

 

                2018           CIARA ARCHER MD           Ot           

   I70.231         

                          ATHSCL NATIVE ARTERIES OF RIGHT LEG W UL              

      

 

                2018           CIARA ARCHER MD           Ot           

   I70.244         

                          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              

      

 

                2018           CIARA ARCHER MD           Ot           

   I87.333         

                          CHRONIC VENOUS HTN W ULCER AND INFLAM OF              

      

 

                2018           CIARA ARCHER MD           Ot           

   L97.312         

                          NON-PRS CHRONIC ULCER OF RIGHT ANKLE W F              

      

 

                2018           CIARA ARCHER MD           Ot           

   L97.322         

                          NON-PRESSURE CHRONIC ULCER OF LEFT ANKLE              

      

 

             2018           CIARA ARCHER MD           Ot           M34

.9           

SYSTEMIC SCLEROSIS, UNSPECIFIED                    

 

                2018           CIARA ARCHER MD           Ot           

   I70.234         

                          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              

      

 

                2018           CIARA ARCHER MD           Ot           

   I70.244         

                          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              

      

 

                2018           CIARA ARCHER MD           Ot           

   I87.333         

                          CHRONIC VENOUS HTN W ULCER AND INFLAM OF              

      

 

                2018           CIARA ARCHER MD           Ot           

   L97.312         

                          NON-PRS CHRONIC ULCER OF RIGHT ANKLE W F              

      

 

                2018           CIARA ARCHER MD, Ot           

   L97.322         

                          NON-PRESSURE CHRONIC ULCER OF LEFT ANKLE              

      

 

             2018           CIARA ARCHER MD, Ot           M34

.9           

SYSTEMIC SCLEROSIS, UNSPECIFIED                    

 

                2018           CIARA ARCHER MD           Ot           

   I70.234         

                          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              

      

 

                2018           CIARA ARCHER MD           Ot           

   L97.312         

                          NON-PRS CHRONIC ULCER OF RIGHT ANKLE W F              

      

 

                2018           CIARA ARCHER MD           Ot           

   I70.234         

                          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              

      

 

                2018           CIARA ARCHER MD, Ot           

   L97.312         

                          NON-PRS CHRONIC ULCER OF RIGHT ANKLE W F              

      

 

             2018           JOSH MINA, WILLIAM P           Ot           R94.

5           

ABNORMAL RESULTS OF LIVER FUNCTION STUDI                    

 

             2018           JOSH MINA, WILLIAM P           Ot           R94.

5           

ABNORMAL RESULTS OF LIVER FUNCTION STUDI                    

 

                2018           CIARA ARCHER MD           Ot           

   I70.234         

                          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              

      

 

                2018           CIARA ARCHER MD           Ot           

   I70.244         

                          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              

      

 

                2018           CIARA ARCHER MD           Ot           

   L97.312         

                          NON-PRS CHRONIC ULCER OF RIGHT ANKLE W F              

      

 

                2018           CIARA ARCHER MD           Ot           

   L97.322         

                          NON-PRESSURE CHRONIC ULCER OF LEFT ANKLE              

      

 

                2018           CIARA ARCHER MD           Ot           

   L97.412         

                          NON-PRS CHR ULCER OF RIGHT HEEL AND MIDF              

      

 

                2018           CIARA ARCHER MD           Ot           

   L97.422         

                          NON-PRS CHR ULCER OF LEFT HEEL AND MIDFO              

      

 

             2018           CIARA ARCHER MD, Ot           M34

.9           

SYSTEMIC SCLEROSIS, UNSPECIFIED                    

 

                2018           CIARA ARCHER MD           Ot           

   I70.234         

                          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              

      

 

                2018           CIARA ARCHER MD           Ot           

   I70.244         

                          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              

      

 

                2018           CIARA ARCHER MD           Ot           

   L97.312         

                          NON-PRS CHRONIC ULCER OF RIGHT ANKLE W F              

      

 

                2018           CIARA ARCHER MD           Ot           

   L97.322         

                          NON-PRESSURE CHRONIC ULCER OF LEFT ANKLE              

      

 

                2018           CIARA ARCHER MD           Ot           

   L97.412         

                          NON-PRS CHR ULCER OF RIGHT HEEL AND MIDF              

      

 

                2018           CIARA ARCHER MD           Ot           

   L97.422         

                          NON-PRS CHR ULCER OF LEFT HEEL AND MIDFO              

      

 

             2018           CIARA ARCHER MD, Ot           M34

.9           

SYSTEMIC SCLEROSIS, UNSPECIFIED                    

 

                2018           CIARA ARCHER MD           Ot           

   I70.231         

                          ATHSCL NATIVE ARTERIES OF RIGHT LEG W UL              

      

 

                2018           CIARA ARCHER MD           Ot           

   I70.244         

                          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              

      

 

                2018           CIARA ARCHER MD           Ot           

   I87.333         

                          CHRONIC VENOUS HTN W ULCER AND INFLAM OF              

      

 

                2018           CIARA ARCHER MD           Ot           

   L97.312         

                          NON-PRS CHRONIC ULCER OF RIGHT ANKLE W F              

      

 

                2018           CIARA ARCHER MD           Ot           

   L97.322         

                          NON-PRESSURE CHRONIC ULCER OF LEFT ANKLE              

      

 

             2018           CIARA ARCHER MD, Ot           M34

.9           

SYSTEMIC SCLEROSIS, UNSPECIFIED                    

 

                2018           CIARA ARCHER MD           Ot           

   I70.234         

                          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              

      

 

                2018           CIARA ARCHER MD           Ot           

   I70.244         

                          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              

      

 

                2018           CIARA ARCHER MD           Ot           

   I87.333         

                          CHRONIC VENOUS HTN W ULCER AND INFLAM OF              

      

 

                2018           CIARA ARCHER MD           Ot           

   L97.312         

                          NON-PRS CHRONIC ULCER OF RIGHT ANKLE W F              

      

 

                2018           CIARA ARCHER MD           Ot           

   L97.322         

                          NON-PRESSURE CHRONIC ULCER OF LEFT ANKLE              

      

 

             2018           CIARA ARCHER MD, Ot           M34

.9           

SYSTEMIC SCLEROSIS, UNSPECIFIED                    

 

                2018           YENY DINH DO           Ot      

        M34.9      

                          SYSTEMIC SCLEROSIS, UNSPECIFIED                    

 

                2018           YENY DINH DO           Ot      

        M34.9      

                          SYSTEMIC SCLEROSIS, UNSPECIFIED                    

 

                2018           CIARA ARCHER MD           Ot           

   I70.234         

                          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              

      

 

                2018           CIARA ARCHER MD           Ot           

   I70.244         

                          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              

      

 

                2018           CIARA ARCHER MD           Ot           

   L97.312         

                          NON-PRS CHRONIC ULCER OF RIGHT ANKLE W F              

      

 

                2018           CIARA ARCHER MD           Ot           

   L97.322         

                          NON-PRESSURE CHRONIC ULCER OF LEFT ANKLE              

      

 

                2018           CIARA ARCHER MD           Ot           

   L97.412         

                          NON-PRS CHR ULCER OF RIGHT HEEL AND MIDF              

      

 

                2018           CIARA ARCHER MD           Ot           

   L97.422         

                          NON-PRS CHR ULCER OF LEFT HEEL AND MIDFO              

      

 

             2018           CIARA ARCHER MD, Ot           M34

.9           

SYSTEMIC SCLEROSIS, UNSPECIFIED                    

 

                2018           CIARA ARCHER MD           Ot           

   I70.234         

                          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              

      

 

                2018           CIARA ARCHER MD           Ot           

   I70.244         

                          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              

      

 

                2018           CIARA ARCHER MD           Ot           

   L97.312         

                          NON-PRS CHRONIC ULCER OF RIGHT ANKLE W F              

      

 

                2018           CIARA ARCHER MD           Ot           

   L97.322         

                          NON-PRESSURE CHRONIC ULCER OF LEFT ANKLE              

      

 

                2018           CIARA ARCHER MD           Ot           

   L97.412         

                          NON-PRS CHR ULCER OF RIGHT HEEL AND MIDF              

      

 

                2018           CIARA ARCHER MD           Ot           

   L97.422         

                          NON-PRS CHR ULCER OF LEFT HEEL AND MIDFO              

      

 

             2018           CIARA ARCHER MD, Ot           M34

.9           

SYSTEMIC SCLEROSIS, UNSPECIFIED                    

 

                2018           CIARA ARCHER MD           Ot           

   I70.231         

                          ATHSCL NATIVE ARTERIES OF RIGHT LEG W UL              

      

 

                2018           CIARA ARCHER MD           Ot           

   I70.244         

                          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              

      

 

                2018           CIARA ARCHER MD           Ot           

   I87.333         

                          CHRONIC VENOUS HTN W ULCER AND INFLAM OF              

      

 

                2018           CIARA ARCHER MD           Ot           

   L97.312         

                          NON-PRS CHRONIC ULCER OF RIGHT ANKLE W F              

      

 

                2018           CIARA ARCHER MD           Ot           

   L97.322         

                          NON-PRESSURE CHRONIC ULCER OF LEFT ANKLE              

      

 

             2018           CIARA ARCHER MD, Ot           M34

.9           

SYSTEMIC SCLEROSIS, UNSPECIFIED                    

 

                2018           CIARA ARCHER MD           Ot           

   I70.234         

                          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              

      

 

                2018           CIARA ARCHER MD           Ot           

   I70.244         

                          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              

      

 

                2018           CIARA ARCHER MD           Ot           

   I87.333         

                          CHRONIC VENOUS HTN W ULCER AND INFLAM OF              

      

 

                2018           CIARA ARCHER MD, Ot           

   L97.312         

                          NON-PRS CHRONIC ULCER OF RIGHT ANKLE W F              

      

 

                2018           CIARA ARCHER MD           Ot           

   L97.322         

                          NON-PRESSURE CHRONIC ULCER OF LEFT ANKLE              

      

 

             2018           CIARA ARCHER MD, Ot           M34

.9           

SYSTEMIC SCLEROSIS, UNSPECIFIED                    

 

                2018           YENY DINH DO           Ot      

        M34.9      

                          SYSTEMIC SCLEROSIS, UNSPECIFIED                    

 

                2018           MERLIN MINA, FABRICIO WHARTON           Ot    

          M34.9    

                          SYSTEMIC SCLEROSIS, UNSPECIFIED                    

 

                2018           ROBINA QUEEN           Ot      

        J98.11     

                          ATELECTASIS                        

 

                2018           ROBINA QUEEN APRN           Ot      

        J98.4      

                          OTHER DISORDERS OF LUNG                    

 

                2018           ROBINA QUEEN           Ot      

        M34.9      

                          SYSTEMIC SCLEROSIS, UNSPECIFIED                    

 

                2018           ROBINA QUEEN           Ot      

        R94.2      

                          ABNORMAL RESULTS OF PULMONARY FUNCTION S              

      

 

                2018           CIARA ARCHER MD           Ot           

   I70.234         

                          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              

      

 

                2018           CIARA ARCHER MD           Ot           

   I70.244         

                          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              

      

 

                2018           CIARA ARCHER MD           Ot           

   L97.312         

                          NON-PRS CHRONIC ULCER OF RIGHT ANKLE W F              

      

 

                2018           CIARA ARCHER MD           Ot           

   L97.322         

                          NON-PRESSURE CHRONIC ULCER OF LEFT ANKLE              

      

 

                2018           CIARA ARCHER MD           Ot           

   L97.412         

                          NON-PRS CHR ULCER OF RIGHT HEEL AND MIDF              

      

 

                2018           CIARA ARCHER MD           Ot           

   L97.422         

                          NON-PRS CHR ULCER OF LEFT HEEL AND MIDFO              

      

 

             2018           CIARA ARCHER MD, Ot           M34

.9           

SYSTEMIC SCLEROSIS, UNSPECIFIED                    

 

                2018           CIARA ARCHER MD           Ot           

   I70.234         

                          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              

      

 

                2018           CIARA ARCHER MD           Ot           

   I70.244         

                          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              

      

 

                2018           CIARA ARCHER MD           Ot           

   L97.312         

                          NON-PRS CHRONIC ULCER OF RIGHT ANKLE W F              

      

 

                2018           CIARA ARCHER MD, Ot           

   L97.322         

                          NON-PRESSURE CHRONIC ULCER OF LEFT ANKLE              

      

 

                2018           CIARA ARCHER MD, Ot           

   L97.412         

                          NON-PRS CHR ULCER OF RIGHT HEEL AND MIDF              

      

 

                2018           CIARA ARCHER MD, Ot           

   L97.422         

                          NON-PRS CHR ULCER OF LEFT HEEL AND MIDFO              

      

 

             2018           CIARA ARCHER MD, Ot           M34

.9           

SYSTEMIC SCLEROSIS, UNSPECIFIED                    

 

                2018           CIARA ARCHER MD           Ot           

   I70.231         

                          ATHSCL NATIVE ARTERIES OF RIGHT LEG W UL              

      

 

                2018           CIARA ARCHER MD, Ot           

   I70.244         

                          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              

      

 

                2018           CIARA ARCHER MD, Ot           

   I87.333         

                          CHRONIC VENOUS HTN W ULCER AND INFLAM OF              

      

 

                2018           CIARA ARCHER MD, Ot           

   L97.312         

                          NON-PRS CHRONIC ULCER OF RIGHT ANKLE W F              

      

 

                2018           CIARA ARCHER MD, Ot           

   L97.322         

                          NON-PRESSURE CHRONIC ULCER OF LEFT ANKLE              

      

 

             2018           CIARA ARCHER MD, Ot           M34

.9           

SYSTEMIC SCLEROSIS, UNSPECIFIED                    

 

                2018           CIARA ARCHER MD           Ot           

   I70.234         

                          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              

      

 

                2018           CIARA ARCHER MD           Ot           

   I70.244         

                          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              

      

 

                2018           CIARA ARCHER MD           Ot           

   I87.333         

                          CHRONIC VENOUS HTN W ULCER AND INFLAM OF              

      

 

                2018           CIARA ARCHER MD           Ot           

   L97.312         

                          NON-PRS CHRONIC ULCER OF RIGHT ANKLE W F              

      

 

                2018           CIARA ARCHER MD, Ot           

   L97.322         

                          NON-PRESSURE CHRONIC ULCER OF LEFT ANKLE              

      

 

             2018           CIARA ARCHER MD, Ot           M34

.9           

SYSTEMIC SCLEROSIS, UNSPECIFIED                    

 

                2018           YENY DINH DO           Ot      

        M34.9      

                          SYSTEMIC SCLEROSIS, UNSPECIFIED                    

 

             2018           LYNSEY SANTILLAN           Ot           D47.2  

         

MONOCLONAL GAMMOPATHY                            

 

             2018           LYNSEY SANTILLAN           Ot           D64.9  

         

ANEMIA, UNSPECIFIED                              

 

             2018           LYNSEY SANTILLAN           Ot           I10    

       

ESSENTIAL (PRIMARY) HYPERTENSION                    

 

             2018           LYNSEY SANTILLAN           Ot           J98.4  

         

OTHER DISORDERS OF LUNG                          

 

             2018           LYNSEY SANTILLAN N           Ot           M34.9  

         

SYSTEMIC SCLEROSIS, UNSPECIFIED                    

 

             2018           LYNSEY SANTILLAN           Ot           Z79.899

           

OTHER LONG TERM (CURRENT) DRUG THERAPY                    

 

                2018           ROBINA QUEEN APRN           Ot      

        J98.11     

                          ATELECTASIS                        

 

                2018           ROBINA QUEEN APRN           Ot      

        J98.4      

                          OTHER DISORDERS OF LUNG                    

 

                2018           BRETROBINA ONTIVEROS APRN           Ot      

        M34.9      

                          SYSTEMIC SCLEROSIS, UNSPECIFIED                    

 

                2018           ROBINA QUEEN APRN           Ot      

        R94.2      

                          ABNORMAL RESULTS OF PULMONARY FUNCTION S              

      

 

             2018           LYNSEY SANTILLAN N           Ot           M19.031

           

PRIMARY OSTEOARTHRITIS, RIGHT WRIST                    

 

             2018           LYNSEY SANTILLAN N           Ot           M19.032

           

PRIMARY OSTEOARTHRITIS, LEFT WRIST                    

 

             2018           LYNSEY SANTILLAN N           Ot           M34.9  

         

SYSTEMIC SCLEROSIS, UNSPECIFIED                    

 

             2018           LYNSEY SANTILLAN N           Ot           M19.031

           

PRIMARY OSTEOARTHRITIS, RIGHT WRIST                    

 

             2018           LYNSEY SANTILLAN N           Ot           M19.032

           

PRIMARY OSTEOARTHRITIS, LEFT WRIST                    

 

             2018           LYNSEY SANTILLAN N           Ot           M34.9  

         

SYSTEMIC SCLEROSIS, UNSPECIFIED                    

 

                2018           CIARA ARCHER MD           Ot           

   I70.234         

                          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              

      

 

                2018           CIARA ARCHER MD           Ot           

   I70.244         

                          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              

      

 

                2018           CIARA ARCHER MD           Ot           

   L97.312         

                          NON-PRS CHRONIC ULCER OF RIGHT ANKLE W F              

      

 

                2018           CIARA ARCHER MD           Ot           

   L97.322         

                          NON-PRESSURE CHRONIC ULCER OF LEFT ANKLE              

      

 

                2018           CIARA ARCHER MD           Ot           

   L97.412         

                          NON-PRS CHR ULCER OF RIGHT HEEL AND MIDF              

      

 

                2018           CIARA ARCHER MD           Ot           

   L97.422         

                          NON-PRS CHR ULCER OF LEFT HEEL AND MIDFO              

      

 

             2018           CIARA ARCHER MD           Ot           M34

.9           

SYSTEMIC SCLEROSIS, UNSPECIFIED                    

 

                2018           CIARA ARCHER MD           Ot           

   I70.234         

                          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              

      

 

                2018           CIARA ARCHER MD           Ot           

   I70.244         

                          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              

      

 

                2018           CIRAA ARCHER MD, Ot           

   L97.312         

                          NON-PRS CHRONIC ULCER OF RIGHT ANKLE W F              

      

 

                2018           CIARA ARCHER MD           Ot           

   L97.322         

                          NON-PRESSURE CHRONIC ULCER OF LEFT ANKLE              

      

 

                2018           CIARA ARCHER MD, Ot           

   L97.412         

                          NON-PRS CHR ULCER OF RIGHT HEEL AND MIDF              

      

 

                2018           CIARA ARCHER MD, Ot           

   L97.422         

                          NON-PRS CHR ULCER OF LEFT HEEL AND MIDFO              

      

 

             2018           CIARA ARCHER MD, Ot           M34

.9           

SYSTEMIC SCLEROSIS, UNSPECIFIED                    

 

                2018           CIARA ARCHER MD           Ot           

   I70.231         

                          ATHSCL NATIVE ARTERIES OF RIGHT LEG W UL              

      

 

                2018           CIARA ARCHER MD, Ot           

   I70.244         

                          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              

      

 

                2018           CIARA ARCHER MD           Ot           

   I87.333         

                          CHRONIC VENOUS HTN W ULCER AND INFLAM OF              

      

 

                2018           CIARA ARCHER MD           Ot           

   L97.312         

                          NON-PRS CHRONIC ULCER OF RIGHT ANKLE W F              

      

 

                2018           CIARA ARCHER MD           Ot           

   L97.322         

                          NON-PRESSURE CHRONIC ULCER OF LEFT ANKLE              

      

 

             2018           CIARA ARCHER MD, Ot           M34

.9           

SYSTEMIC SCLEROSIS, UNSPECIFIED                    

 

                2018           CIARA ARCHER MD           Ot           

   I70.234         

                          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              

      

 

                2018           CIARA ARCHER MD           Ot           

   I70.244         

                          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              

      

 

                2018           CIARA ARCHER MD           Ot           

   I87.333         

                          CHRONIC VENOUS HTN W ULCER AND INFLAM OF              

      

 

                2018           CIARA ARCHER MD           Ot           

   L97.312         

                          NON-PRS CHRONIC ULCER OF RIGHT ANKLE W F              

      

 

                2018           CIARA ARCHER MD           Ot           

   L97.322         

                          NON-PRESSURE CHRONIC ULCER OF LEFT ANKLE              

      

 

             2018           CIARA ARCHER MD, Ot           M34

.9           

SYSTEMIC SCLEROSIS, UNSPECIFIED                    

 

                2018           YENY DINH DO           Ot      

        M34.9      

                          SYSTEMIC SCLEROSIS, UNSPECIFIED                    

 

             2018           LYNSEY SANTILLAN           Ot           D47.2  

         

MONOCLONAL GAMMOPATHY                            

 

             2018           LYNSEY SANTILLAN           Ot           D64.9  

         

ANEMIA, UNSPECIFIED                              

 

             2018           LYNSEY SANTILLAN           Ot           I10    

       

ESSENTIAL (PRIMARY) HYPERTENSION                    

 

             2018           TYRELL, BOBAN N           Ot           J98.4  

         

OTHER DISORDERS OF LUNG                          

 

             2018           TYRELL, CONNORAN N           Ot           M34.9  

         

SYSTEMIC SCLEROSIS, UNSPECIFIED                    

 

             2018           TYRELL, BOBAN N           Ot           Z79.899

           

OTHER LONG TERM (CURRENT) DRUG THERAPY                    

 

                2018           ROBINA QUEEN APRN           Ot      

        J98.11     

                          ATELECTASIS                        

 

                2018           BRETROBINA ONTIVEROS APRN           Ot      

        J98.4      

                          OTHER DISORDERS OF LUNG                    

 

                2018           BRETROBINA E APRN           Ot      

        M34.9      

                          SYSTEMIC SCLEROSIS, UNSPECIFIED                    

 

                2018           BRETROBINA ONTIVEROS E APRN           Ot      

        R94.2      

                          ABNORMAL RESULTS OF PULMONARY FUNCTION S              

      

 

             2018           TYRELL BOBAN N           Ot           M19.031

           

PRIMARY OSTEOARTHRITIS, RIGHT WRIST                    

 

             2018           TYRELL, BOBAN N           Ot           M19.032

           

PRIMARY OSTEOARTHRITIS, LEFT WRIST                    

 

             2018           TYRELL BOBAN N           Ot           M34.9  

         

SYSTEMIC SCLEROSIS, UNSPECIFIED                    

 

             2018           TYRELL BOBAN N           Ot           M19.031

           

PRIMARY OSTEOARTHRITIS, RIGHT WRIST                    

 

             2018           TYRELL, BOBAN N           Ot           M19.032

           

PRIMARY OSTEOARTHRITIS, LEFT WRIST                    

 

             2018           TYRELL, BOBAN N           Ot           M34.9  

         

SYSTEMIC SCLEROSIS, UNSPECIFIED                    

 

             01/10/2019           TYRELL, BOBAN N           Ot           D47.2  

         

MONOCLONAL GAMMOPATHY                            

 

             01/10/2019           TYRELL, BOBAN N           Ot           D64.9  

         

ANEMIA, UNSPECIFIED                              

 

             01/10/2019           TYRELL, BOBAN N           Ot           I10    

       

ESSENTIAL (PRIMARY) HYPERTENSION                    

 

             01/10/2019           TYRELL BOBAN N           Ot           J98.4  

         

OTHER DISORDERS OF LUNG                          

 

             01/10/2019           TYRELL, BOBAN N           Ot           M34.9  

         

SYSTEMIC SCLEROSIS, UNSPECIFIED                    

 

             01/10/2019           TYRELL, BOBAN N           Ot           Z79.899

           

OTHER LONG TERM (CURRENT) DRUG THERAPY                    

 

             2019           TYRELL, BOBAN N           Ot           D47.2  

         

MONOCLONAL GAMMOPATHY                            

 

             2019           TYRELL, BOBAN N           Ot           D64.9  

         

ANEMIA, UNSPECIFIED                              

 

             2019           TYRELL, BOBAN N           Ot           I10    

       

ESSENTIAL (PRIMARY) HYPERTENSION                    

 

             2019           TYRELL BOBAN N           Ot           J98.4  

         

OTHER DISORDERS OF LUNG                          

 

             2019           TYRELL, BOBAN N           Ot           M34.9  

         

SYSTEMIC SCLEROSIS, UNSPECIFIED                    

 

             2019           LYNSEY SANTILLAN           Ot           Z79.899

           

OTHER LONG TERM (CURRENT) DRUG THERAPY                    

 

             2019           LYNSEY SANTILLAN           Ot           D47.2  

         

MONOCLONAL GAMMOPATHY                            

 

             2019           LYNSEY SANTILLAN N           Ot           D64.9  

         

ANEMIA, UNSPECIFIED                              

 

             2019           LYNSEY SANTILLAN N           Ot           I10    

       

ESSENTIAL (PRIMARY) HYPERTENSION                    

 

             2019           LYNSEY SANTILLAN USMAN           Ot           J98.4  

         

OTHER DISORDERS OF LUNG                          

 

             2019           LYNSEY SANTILLAN N           Ot           M34.9  

         

SYSTEMIC SCLEROSIS, UNSPECIFIED                    

 

             2019           LYNSEY SANTILLAN N           Ot           Z79.899

           

OTHER LONG TERM (CURRENT) DRUG THERAPY                    

 

             2019           LYNSEY SANTILLAN           Ot           D47.2  

         

MONOCLONAL GAMMOPATHY                            

 

             2019           LYNSEY SANTILLAN USMAN           Ot           D64.9  

         

ANEMIA, UNSPECIFIED                              

 

             2019           LYNSEY SANTILLAN N           Ot           I10    

       

ESSENTIAL (PRIMARY) HYPERTENSION                    

 

             2019           LYNSEY SANTILLAN USMAN           Ot           J98.4  

         

OTHER DISORDERS OF LUNG                          

 

             2019           LYNSEY SANTILLAN USMAN           Ot           M34.9  

         

SYSTEMIC SCLEROSIS, UNSPECIFIED                    

 

             2019           LYNSEY SANTILLAN USMAN           Ot           Z79.899

           

OTHER LONG TERM (CURRENT) DRUG THERAPY                    

 

             2019           ZARA TORO MD, Ot           M34.9

           

SYSTEMIC SCLEROSIS, UNSPECIFIED                    

 

             2019           ZARA TORO MD           Ot           Z79.8

99           

OTHER LONG TERM (CURRENT) DRUG THERAPY                    

 

                2019           CIARA ARCHER MD           Ot           

   I70.234         

                          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              

      

 

                2019           CIARA ARCHER MD           Ot           

   I70.244         

                          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              

      

 

                2019           CIARA ARCHER MD, Ot           

   L97.312         

                          NON-PRS CHRONIC ULCER OF RIGHT ANKLE W F              

      

 

                2019           CIARA ARCHER MD, Ot           

   L97.322         

                          NON-PRESSURE CHRONIC ULCER OF LEFT ANKLE              

      

 

                2019           CIARA ARCHER MD, Ot           

   L97.412         

                          NON-PRS CHR ULCER OF RIGHT HEEL AND MIDF              

      

 

                2019           CIARA ARCHER MD, Ot           

   L97.422         

                          NON-PRS CHR ULCER OF LEFT HEEL AND MIDFO              

      

 

             2019           CIARA ARCHER MD, Ot           M34

.9           

SYSTEMIC SCLEROSIS, UNSPECIFIED                    

 

                2019           CIARA ARCHER MD           Ot           

   I70.234         

                          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              

      

 

                2019           CIARA ARCHER MD           Ot           

   I70.244         

                          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              

      

 

                2019           CIARA ARCHER MD           Ot           

   L97.312         

                          NON-PRS CHRONIC ULCER OF RIGHT ANKLE W F              

      

 

                2019           CIARA ARCHER MD           Ot           

   L97.322         

                          NON-PRESSURE CHRONIC ULCER OF LEFT ANKLE              

      

 

                2019           CIARA ARCHER MD           Ot           

   L97.412         

                          NON-PRS CHR ULCER OF RIGHT HEEL AND MIDF              

      

 

                2019           CIARA ARCHER MD           Ot           

   L97.422         

                          NON-PRS CHR ULCER OF LEFT HEEL AND MIDFO              

      

 

             2019           CIARA ARCHER MD, Ot           M34

.9           

SYSTEMIC SCLEROSIS, UNSPECIFIED                    

 

                2019           CIARA ARCHER MD           Ot           

   I70.231         

                          ATHSCL NATIVE ARTERIES OF RIGHT LEG W UL              

      

 

                2019           CIARA ARCHER MD           Ot           

   I70.244         

                          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              

      

 

                2019           CIARA ARCHER MD           Ot           

   I87.333         

                          CHRONIC VENOUS HTN W ULCER AND INFLAM OF              

      

 

                2019           CIARA ARCHER MD           Ot           

   L97.312         

                          NON-PRS CHRONIC ULCER OF RIGHT ANKLE W F              

      

 

                2019           CIARA ARCHER MD           Ot           

   L97.322         

                          NON-PRESSURE CHRONIC ULCER OF LEFT ANKLE              

      

 

             2019           CIARA ARCHER MD, Ot           M34

.9           

SYSTEMIC SCLEROSIS, UNSPECIFIED                    

 

                2019           CIARA ARCHER MD           Ot           

   I70.234         

                          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              

      

 

                2019           CIARA ARCHER MD           Ot           

   I70.244         

                          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              

      

 

                2019           CIARA ARCHER MD           Ot           

   I87.333         

                          CHRONIC VENOUS HTN W ULCER AND INFLAM OF              

      

 

                2019           CIARA ARCHER MD           Ot           

   L97.312         

                          NON-PRS CHRONIC ULCER OF RIGHT ANKLE W F              

      

 

                2019           CIARA ARCHER MD           Ot           

   L97.322         

                          NON-PRESSURE CHRONIC ULCER OF LEFT ANKLE              

      

 

             2019           CIARA ARCHER MD, Ot           M34

.9           

SYSTEMIC SCLEROSIS, UNSPECIFIED                    

 

                2019           ORENDER DO, YENY S           Ot      

        M34.9      

                          SYSTEMIC SCLEROSIS, UNSPECIFIED                    

 

                2019           ROBINA QUEEN APRN           Ot      

        J98.11     

                          ATELECTASIS                        

 

                2019           ROBINA QUEEN APRN           Ot      

        J98.4      

                          OTHER DISORDERS OF LUNG                    

 

                2019           ROBINA QUEEN APRN           Ot      

        M34.9      

                          SYSTEMIC SCLEROSIS, UNSPECIFIED                    

 

                2019           ROBINA QUEEN APRN           Ot      

        R94.2      

                          ABNORMAL RESULTS OF PULMONARY FUNCTION S              

      

 

             2019           TYRELL LYNSEY N           Ot           M19.031

           

PRIMARY OSTEOARTHRITIS, RIGHT WRIST                    

 

             2019           TYRELL, LYNSEY N           Ot           M19.032

           

PRIMARY OSTEOARTHRITIS, LEFT WRIST                    

 

             2019           TYRELL, LYNSEY N           Ot           M34.9  

         

SYSTEMIC SCLEROSIS, UNSPECIFIED                    

 

             2019           TYRELLLYNSEY N           Ot           D47.2  

         

MONOCLONAL GAMMOPATHY                            

 

             2019           TYRELLLYNSEY N           Ot           D64.9  

         

ANEMIA, UNSPECIFIED                              

 

             2019           TYRELL, BOBAN N           Ot           I10    

       

ESSENTIAL (PRIMARY) HYPERTENSION                    

 

             2019           LYNSEY SANTILLAN N           Ot           J98.4  

         

OTHER DISORDERS OF LUNG                          

 

             2019           TYRELLLYNSEY N           Ot           M34.9  

         

SYSTEMIC SCLEROSIS, UNSPECIFIED                    

 

             2019           TYRELLLYNSEY N           Ot           Z79.899

           

OTHER LONG TERM (CURRENT) DRUG THERAPY                    

 

             2019           ZARA TORO MD           Ot           M34.9

           

SYSTEMIC SCLEROSIS, UNSPECIFIED                    

 

             2019           ZARA TORO MD           Ot           Z79.8

99           

OTHER LONG TERM (CURRENT) DRUG THERAPY                    

 

             2019           ZARA TORO MD           Ot           Z51.8

1           

ENCOUNTER FOR THERAPEUTIC DRUG LEVEL MON                    

 

             2019           ZARA TORO MD B           Ot           Z79.8

99           

OTHER LONG TERM (CURRENT) DRUG THERAPY                    

 

             2019           ZARA TORO MD           Ot           Z51.8

1           

ENCOUNTER FOR THERAPEUTIC DRUG LEVEL MON                    

 

             2019           ZARA TORO MD B           Ot           Z79.8

99           

OTHER LONG TERM (CURRENT) DRUG THERAPY                    

 

             2019           LYNSEY SANTILLAN N           Ot           D47.2  

         

MONOCLONAL GAMMOPATHY                            

 

             2019           LYNSEY SANTILLAN N           Ot           D64.9  

         

ANEMIA, UNSPECIFIED                              

 

             2019           TYRELL, BOBAN N           Ot           I10    

       

ESSENTIAL (PRIMARY) HYPERTENSION                    

 

             2019           LYNSEY SANTILLAN N           Ot           J98.4  

         

OTHER DISORDERS OF LUNG                          

 

             2019           LYNSEY SANTILLAN N           Ot           M34.9  

         

SYSTEMIC SCLEROSIS, UNSPECIFIED                    

 

             2019           LYNSEY SANTILLAN N           Ot           Z79.899

           

OTHER LONG TERM (CURRENT) DRUG THERAPY                    

 

             2019           LYNSEY SANTILLAN N           Ot           D47.2  

         

MONOCLONAL GAMMOPATHY                            

 

             2019           LYNSEY SANTILLAN N           Ot           D64.9  

         

ANEMIA, UNSPECIFIED                              

 

             2019           LYNSEY SANTILLAN N           Ot           I10    

       

ESSENTIAL (PRIMARY) HYPERTENSION                    

 

             2019           LYNSEY SANTILLAN N           Ot           J98.4  

         

OTHER DISORDERS OF LUNG                          

 

             2019           LYNSEY SANTILLAN N           Ot           M34.9  

         

SYSTEMIC SCLEROSIS, UNSPECIFIED                    

 

             2019           LYNSEY SANTILLAN N           Ot           Z79.899

           

OTHER LONG TERM (CURRENT) DRUG THERAPY                    

 

             2019           CORTEZ MINA, ZARA THOMSON           Ot           M34.9

           

SYSTEMIC SCLEROSIS, UNSPECIFIED                    

 

             2019           CORTEZ MINA, ZARA B           Ot           Z79.8

99           

OTHER LONG TERM (CURRENT) DRUG THERAPY                    

 

             2020           LYNSEY SANTILLAN N           Ot           D47.2  

         

MONOCLONAL GAMMOPATHY                            

 

             2020           LYNSEY SANTILLAN N           Ot           D64.9  

         

ANEMIA, UNSPECIFIED                              

 

             2020           LYNSEY SANTILLAN N           Ot           I10    

       

ESSENTIAL (PRIMARY) HYPERTENSION                    

 

             2020           LYNSEY SANTILLAN N           Ot           J98.4  

         

OTHER DISORDERS OF LUNG                          

 

             2020           LYNSEY SANTILLAN N           Ot           M34.9  

         

SYSTEMIC SCLEROSIS, UNSPECIFIED                    

 

             2020           LYNSEY SANTILLAN N           Ot           Z79.899

           

OTHER LONG TERM (CURRENT) DRUG THERAPY                    

 

                2020           MERLIN MINA, LOYDA           Ot    

          M34.9    

                          SYSTEMIC SCLEROSIS, UNSPECIFIED                    

 

             2020                        Ot           D47.2           MONO

CLONAL 

GAMMOPATHY                                       

 

             2020                        Ot           D64.9           ANEM

IA, 

UNSPECIFIED                                      

 

             2020                        Ot           I10           ESSENT

IAL (PRIMARY) 

HYPERTENSION                                     

 

             2020                        Ot           J98.4           OTHE

R DISORDERS OF

LUNG                                             

 

             2020                        Ot           M34.9           SYST

EMIC 

SCLEROSIS, UNSPECIFIED                           

 

             2020                        Ot           Z79.899           OT

HER LONG TERM 

(CURRENT) DRUG THERAPY                           



                                                                                
                                                                                
                                                                                
                                                                                
                                                                                
                                                                                
                                                                                
                           



Procedures

      



There is no data.                  



Results

      



                    Test                Result              Range        

 

                                        Complete blood count (CBC) with automate

d white blood cell (WBC) differential - 

10/06/17 12:48         

 

                          Blood leukocytes automated count (number/volume)      

     4.6 10*3/uL          

                                        4.3-11.0        

 

                          Blood erythrocytes automated count (number/volume)    

       4.29 10*6/uL       

                                        4.35-5.85        

 

                    Venous blood hemoglobin measurement (mass/volume)           

12.5 g/dL           

11.5-16.0        

 

                    Blood hematocrit (volume fraction)           39 %           

     35-52        

 

                    Automated erythrocyte mean corpuscular volume           92 [

foz_us]           

80-99        

 

                                        Automated erythrocyte mean corpuscular h

emoglobin (mass per erythrocyte)        

                          29 pg                     25-34        

 

                                        Automated erythrocyte mean corpuscular h

emoglobin concentration measurement 

(mass/volume)             32 g/dL                   32-36        

 

                    Automated erythrocyte distribution width ratio           13.

8 %              10.0-

14.5        

 

                    Automated blood platelet count (count/volume)           426 

10*3/uL           

130-400        

 

                          Automated blood platelet mean volume measurement      

     8.9 [foz_us]         

                                        7.4-10.4        

 

                    Automated blood neutrophils/100 leukocytes           51 %   

             42-75       

 

 

                    Automated blood lymphocytes/100 leukocytes           36 %   

             12-44       

 

 

                    Blood monocytes/100 leukocytes           12 %               

 0-12        

 

                    Automated blood eosinophils/100 leukocytes           1 %    

             0-10        

 

                    Automated blood basophils/100 leukocytes           1 %      

           0-10        

 

                    Blood neutrophils automated count (number/volume)           

2.3 10*3            

1.8-7.8        

 

                    Blood lymphocytes automated count (number/volume)           

1.6 10*3            

1.0-4.0        

 

                    Blood monocytes automated count (number/volume)           0.

5 10*3            

0.0-1.0        

 

                    Automated eosinophil count           0.1 10*3/uL           0

.0-0.3        

 

                    Automated blood basophil count (count/volume)           0.0 

10*3/uL           

0.0-0.1        

 

                                        Comprehensive metabolic panel - 10/06/17

 12:48         

 

                          Serum or plasma sodium measurement (moles/volume)     

      138 mmol/L          

                                        135-145        

 

                          Serum or plasma potassium measurement (moles/volume)  

         3.9 mmol/L       

                                        3.6-5.0        

 

                          Serum or plasma chloride measurement (moles/volume)   

        108 mmol/L        

                                                

 

                    Carbon dioxide           23 mmol/L           21-32        

 

                          Serum or plasma anion gap determination (moles/volume)

           7 mmol/L       

                                        5-14        

 

                          Serum or plasma urea nitrogen measurement (mass/volume

)           9 mg/dL       

                                        7-18        

 

                          Serum or plasma creatinine measurement (mass/volume)  

         0.65 mg/dL       

                                        0.60-1.30        

 

                    Serum or plasma urea nitrogen/creatinine mass ratio         

  14                  NRG 

       

 

                                        Serum or plasma creatinine measurement w

ith calculation of estimated glomerular 

filtration rate           >                         NRG        

 

                    Serum or plasma glucose measurement (mass/volume)           

106 mg/dL           

        

 

                    Serum or plasma calcium measurement (mass/volume)           

9.9 mg/dL           

8.5-10.1        

 

                          Serum or plasma total bilirubin measurement (mass/volu

me)           0.5 mg/dL   

                                        0.1-1.0        

 

                                        Serum or plasma alkaline phosphatase haylie

surement (enzymatic activity/volume)    

                          140 U/L                           

 

                                        Serum or plasma aspartate aminotransfera

se measurement (enzymatic 

activity/volume)           71 U/L                    5-34        

 

                                        Serum or plasma alanine aminotransferase

 measurement (enzymatic activity/volume)

                          100 U/L                   0-55        

 

                    Serum or plasma protein measurement (mass/volume)           

8.6 g/dL            

6.4-8.2        

 

                    Serum or plasma albumin measurement (mass/volume)           

4.1 g/dL            

3.2-4.5        

 

                                        Complete blood count (CBC) with automate

d white blood cell (WBC) differential - 

18 15:48         

 

                          Blood leukocytes automated count (number/volume)      

     5.7 10*3/uL          

                                        4.3-11.0        

 

                          Blood erythrocytes automated count (number/volume)    

       3.90 10*6/uL       

                                        4.35-5.85        

 

                    Venous blood hemoglobin measurement (mass/volume)           

11.9 g/dL           

11.5-16.0        

 

                    Blood hematocrit (volume fraction)           36 %           

     35-52        

 

                    Automated erythrocyte mean corpuscular volume           92 [

foz_us]           

80-99        

 

                                        Automated erythrocyte mean corpuscular h

emoglobin (mass per erythrocyte)        

                          31 pg                     25-34        

 

                                        Automated erythrocyte mean corpuscular h

emoglobin concentration measurement 

(mass/volume)             33 g/dL                   32-36        

 

                    Automated erythrocyte distribution width ratio           13.

6 %              10.0-

14.5        

 

                    Automated blood platelet count (count/volume)           316 

10*3/uL           

130-400        

 

                          Automated blood platelet mean volume measurement      

     9.1 [foz_us]         

                                        7.4-10.4        

 

                    Automated blood neutrophils/100 leukocytes           62 %   

             42-75       

 

 

                    Automated blood lymphocytes/100 leukocytes           25 %   

             12-44       

 

 

                    Blood monocytes/100 leukocytes           10 %               

 0-12        

 

                    Automated blood eosinophils/100 leukocytes           3 %    

             0-10        

 

                    Automated blood basophils/100 leukocytes           0 %      

           0-10        

 

                    Blood neutrophils automated count (number/volume)           

3.6 10*3            

1.8-7.8        

 

                    Blood lymphocytes automated count (number/volume)           

1.4 10*3            

1.0-4.0        

 

                    Blood monocytes automated count (number/volume)           0.

6 10*3            

0.0-1.0        

 

                    Automated eosinophil count           0.1 10*3/uL           0

.0-0.3        

 

                    Automated blood basophil count (count/volume)           0.0 

10*3/uL           

0.0-0.1        

 

                                        Comprehensive metabolic panel - 18

 15:48         

 

                          Serum or plasma sodium measurement (moles/volume)     

      140 mmol/L          

                                        135-145        

 

                          Serum or plasma potassium measurement (moles/volume)  

         3.8 mmol/L       

                                        3.6-5.0        

 

                          Serum or plasma chloride measurement (moles/volume)   

        109 mmol/L        

                                                

 

                    Carbon dioxide           25 mmol/L           21-32        

 

                          Serum or plasma anion gap determination (moles/volume)

           6 mmol/L       

                                        5-14        

 

                          Serum or plasma urea nitrogen measurement (mass/volume

)           7 mg/dL       

                                        7-18        

 

                          Serum or plasma creatinine measurement (mass/volume)  

         0.57 mg/dL       

                                        0.60-1.30        

 

                    Serum or plasma urea nitrogen/creatinine mass ratio         

  12                  NRG 

       

 

                                        Serum or plasma creatinine measurement w

ith calculation of estimated glomerular 

filtration rate           >                         NRG        

 

                    Serum or plasma glucose measurement (mass/volume)           

98 mg/dL            

        

 

                    Serum or plasma calcium measurement (mass/volume)           

9.3 mg/dL           

8.5-10.1        

 

                          Serum or plasma total bilirubin measurement (mass/volu

me)           0.4 mg/dL   

                                        0.1-1.0        

 

                                        Serum or plasma alkaline phosphatase haylie

surement (enzymatic activity/volume)    

                          98 U/L                            

 

                                        Serum or plasma aspartate aminotransfera

se measurement (enzymatic 

activity/volume)           52 U/L                    5-34        

 

                                        Serum or plasma alanine aminotransferase

 measurement (enzymatic activity/volume)

                          72 U/L                    0-55        

 

                    Serum or plasma protein measurement (mass/volume)           

8.0 g/dL            

6.4-8.2        

 

                    Serum or plasma albumin measurement (mass/volume)           

4.0 g/dL            

3.2-4.5        

 

                                        Serum or plasma creatine kinase measurem

ent (enzymatic activity/volume) - 

18 14:17         

 

                                        Serum or plasma creatine kinase measurem

ent (enzymatic activity/volume)         

                          235 U/L                           

 

                                        Serum aldolase measurement - 18 14

:17         

 

                    Serum aldolase measurement           6.8 U/L             1.5

-8.1        

 

                                        Creatine kinase isoforms assay - 

8 14:17         

 

                                        Serum or plasma creatine kinase MB measu

rement (enzymatic activity/volume)      

                          4 %                       0-4        

 

                                        Serum or plasma creatine kinase BB measu

rement (enzymatic activity/volume) by 

electrophoresis           0 %                       0-0        

 

                                        Serum or plasma creatine kinase MM fract

ion/total creatine kinase enzymatic 

ratio                     96 %                              

 

                                        Cerebrospinal fluid creatine kinase nguyễn

urement (enzymatic activity/volume)     

                          235 U/L                           

 

                    Macroenzyme creatine kinase (CK) type 1 measurement         

  0 %                 0-0 

       

 

                    Macroenzyme creatine kinase (CK) type 2 measurement         

  0 %                 0-0 

       

 

                                        Complete blood count (CBC) with automate

d white blood cell (WBC) differential - 

19 16:43         

 

                          Blood leukocytes automated count (number/volume)      

     3.9 10*3/uL          

                                        4.3-11.0        

 

                          Blood erythrocytes automated count (number/volume)    

       4.11 10*6/uL       

                                        4.35-5.85        

 

                    Venous blood hemoglobin measurement (mass/volume)           

12.1 g/dL           

11.5-16.0        

 

                    Blood hematocrit (volume fraction)           37 %           

     35-52        

 

                    Automated erythrocyte mean corpuscular volume           90 [

foz_us]           

80-99        

 

                                        Automated erythrocyte mean corpuscular h

emoglobin (mass per erythrocyte)        

                          29 pg                     25-34        

 

                                        Automated erythrocyte mean corpuscular h

emoglobin concentration measurement 

(mass/volume)             33 g/dL                   32-36        

 

                    Automated erythrocyte distribution width ratio           14.

5 %              10.0-

14.5        

 

                    Automated blood platelet count (count/volume)           349 

10*3/uL           

130-400        

 

                          Automated blood platelet mean volume measurement      

     8.8 [foz_us]         

                                        7.4-10.4        

 

                    Automated blood neutrophils/100 leukocytes           46 %   

             42-75       

 

 

                    Automated blood lymphocytes/100 leukocytes           42 %   

             12-44       

 

 

                    Blood monocytes/100 leukocytes           9 %                

 0-12        

 

                    Automated blood eosinophils/100 leukocytes           3 %    

             0-10        

 

                    Automated blood basophils/100 leukocytes           1 %      

           0-10        

 

                    Blood neutrophils automated count (number/volume)           

1.8 10*3            

1.8-7.8        

 

                    Blood lymphocytes automated count (number/volume)           

1.6 10*3            

1.0-4.0        

 

                    Blood monocytes automated count (number/volume)           0.

3 10*3            

0.0-1.0        

 

                    Automated eosinophil count           0.1 10*3/uL           0

.0-0.3        

 

                    Automated blood basophil count (count/volume)           0.0 

10*3/uL           

0.0-0.1        

 

                                        Comprehensive metabolic panel - 19

 16:43         

 

                          Serum or plasma sodium measurement (moles/volume)     

      139 mmol/L          

                                        135-145        

 

                          Serum or plasma potassium measurement (moles/volume)  

         3.8 mmol/L       

                                        3.6-5.0        

 

                          Serum or plasma chloride measurement (moles/volume)   

        106 mmol/L        

                                                

 

                    Carbon dioxide           24 mmol/L           21-32        

 

                          Serum or plasma anion gap determination (moles/volume)

           9 mmol/L       

                                        5-14        

 

                          Serum or plasma urea nitrogen measurement (mass/volume

)           9 mg/dL       

                                        7-18        

 

                          Serum or plasma creatinine measurement (mass/volume)  

         0.60 mg/dL       

                                        0.60-1.30        

 

                    Serum or plasma urea nitrogen/creatinine mass ratio         

  15                  NRG 

       

 

                                        Serum or plasma creatinine measurement w

ith calculation of estimated glomerular 

filtration rate           >                         NRG        

 

                    Serum or plasma glucose measurement (mass/volume)           

94 mg/dL            

        

 

                    Serum or plasma calcium measurement (mass/volume)           

9.6 mg/dL           

8.5-10.1        

 

                          Serum or plasma total bilirubin measurement (mass/volu

me)           0.5 mg/dL   

                                        0.1-1.0        

 

                                        Serum or plasma alkaline phosphatase haylie

surement (enzymatic activity/volume)    

                          104 U/L                           

 

                                        Serum or plasma aspartate aminotransfera

se measurement (enzymatic 

activity/volume)           35 U/L                    5-34        

 

                                        Serum or plasma alanine aminotransferase

 measurement (enzymatic activity/volume)

                          32 U/L                    0-55        

 

                    Serum or plasma protein measurement (mass/volume)           

8.0 g/dL            

6.4-8.2        

 

                    Serum or plasma albumin measurement (mass/volume)           

3.9 g/dL            

3.2-4.5        

 

                    CALCIUM CORRECTED           9.7 mg/dL           8.5-10.1    

    

 

                                        Serum or plasma C reactive protein measu

rement (mass/volume) - 19 16:43   

      

 

                          Serum or plasma C reactive protein measurement (mass/v

olume)           1.01 

mg/dL                                   0.00-0.50        

 

                                        Erythrocyte sedimentation rate by harmeet

gren method - 19 16:43         

 

                    Erythrocyte sedimentation rate by westergren method         

  56 mm               0-

30        

 

                                        THIOPURINE METHYLTRANSFERASE A - 

9 16:00         

 

                    TPMT ACT            21.6 u[iU]/mL           24.0-44.0       

 

 

                                        Complete blood count (CBC) with automate

d white blood cell (WBC) differential - 

19 17:05         

 

                          Blood leukocytes automated count (number/volume)      

     3.5 10*3/uL          

                                        4.3-11.0        

 

                          Blood erythrocytes automated count (number/volume)    

       4.01 10*6/uL       

                                        4.35-5.85        

 

                    Venous blood hemoglobin measurement (mass/volume)           

11.2 g/dL           

11.5-16.0        

 

                    Blood hematocrit (volume fraction)           36 %           

     35-52        

 

                    Automated erythrocyte mean corpuscular volume           89 [

foz_us]           

80-99        

 

                                        Automated erythrocyte mean corpuscular h

emoglobin (mass per erythrocyte)        

                          28 pg                     25-34        

 

                                        Automated erythrocyte mean corpuscular h

emoglobin concentration measurement 

(mass/volume)             32 g/dL                   32-36        

 

                    Automated erythrocyte distribution width ratio           15.

5 %              10.0-

14.5        

 

                    Automated blood platelet count (count/volume)           403 

10*3/uL           

130-400        

 

                          Automated blood platelet mean volume measurement      

     9.0 [foz_us]         

                                        7.4-10.4        

 

                    Automated blood neutrophils/100 leukocytes           39 %   

             42-75       

 

 

                    Automated blood lymphocytes/100 leukocytes           46 %   

             12-44       

 

 

                    Blood monocytes/100 leukocytes           11 %               

 0-12        

 

                    Automated blood eosinophils/100 leukocytes           3 %    

             0-10        

 

                    Automated blood basophils/100 leukocytes           1 %      

           0-10        

 

                    Blood neutrophils automated count (number/volume)           

1.4 10*3            

1.8-7.8        

 

                    Blood lymphocytes automated count (number/volume)           

1.6 10*3            

1.0-4.0        

 

                    Blood monocytes automated count (number/volume)           0.

4 10*3            

0.0-1.0        

 

                    Automated eosinophil count           0.1 10*3/uL           0

.0-0.3        

 

                    Automated blood basophil count (count/volume)           0.0 

10*3/uL           

0.0-0.1        

 

                                        Erythrocyte sedimentation rate by harmeet

gren method - 19 17:05         

 

                    Erythrocyte sedimentation rate by westergren method         

  73 mm               0-

30        

 

                                        Comprehensive metabolic panel - 19

 17:05         

 

                          Serum or plasma sodium measurement (moles/volume)     

      136 mmol/L          

                                        135-145        

 

                          Serum or plasma potassium measurement (moles/volume)  

         4.0 mmol/L       

                                        3.6-5.0        

 

                          Serum or plasma chloride measurement (moles/volume)   

        109 mmol/L        

                                                

 

                    Carbon dioxide           20 mmol/L           21-32        

 

                          Serum or plasma anion gap determination (moles/volume)

           7 mmol/L       

                                        5-14        

 

                          Serum or plasma urea nitrogen measurement (mass/volume

)           23 mg/dL      

                                        7-18        

 

                          Serum or plasma creatinine measurement (mass/volume)  

         0.59 mg/dL       

                                        0.60-1.30        

 

                    Serum or plasma urea nitrogen/creatinine mass ratio         

  39                  NRG 

       

 

                                        Serum or plasma creatinine measurement w

ith calculation of estimated glomerular 

filtration rate           >                         NRG        

 

                    Serum or plasma glucose measurement (mass/volume)           

99 mg/dL            

        

 

                    Serum or plasma calcium measurement (mass/volume)           

9.3 mg/dL           

8.5-10.1        

 

                          Serum or plasma total bilirubin measurement (mass/volu

me)           0.3 mg/dL   

                                        0.1-1.0        

 

                                        Serum or plasma alkaline phosphatase haylie

surement (enzymatic activity/volume)    

                          131 U/L                           

 

                                        Serum or plasma aspartate aminotransfera

se measurement (enzymatic 

activity/volume)           31 U/L                    5-34        

 

                                        Serum or plasma alanine aminotransferase

 measurement (enzymatic activity/volume)

                          24 U/L                    0-55        

 

                    Serum or plasma protein measurement (mass/volume)           

8.8 g/dL            

6.4-8.2        

 

                    Serum or plasma albumin measurement (mass/volume)           

3.8 g/dL            

3.2-4.5        

 

                    CALCIUM CORRECTED           9.5 mg/dL           8.5-10.1    

    

 

                                        Serum or plasma creatine kinase measurem

ent (enzymatic activity/volume) - 

19 17:05         

 

                                        Serum or plasma creatine kinase measurem

ent (enzymatic activity/volume)         

                          106 U/L                           

 

                                        Serum or plasma C reactive protein measu

rement (mass/volume) - 19 17:05   

      

 

                          Serum or plasma C reactive protein measurement (mass/v

olume)           1.53 

mg/dL                                   0.00-0.50        

 

                                        Influenza virus A and B antigen detectio

n - 20 09:35         

 

                    CALL POSITIVES (F1 HELP)           DR. MONTILLA AT [id number]     

       NRG        

 

                          FLU RESULT                POSITIVE FOR INFLUENZA B ANT

IGEN, NEG FOR A ANTIGEN, BY IA 

                                        NRG        

 

                                        PT panel in platelet poor plasma by coag

ulation assay - 20 09:40         

 

                          Prothrombin time (PT) in platelet poor plasma by coagu

lation assay           

14.1 s                                  12.2-14.7        

 

                          INR in platelet poor plasma or blood by coagulation as

say           1.1         

                                        0.8-1.4        

 

                                        Activated partial thromboplastin time (a

PTT) in platelet poor plasma 

bycoagulation assay - 20 09:40         

 

                                        Activated partial thromboplastin time (a

PTT) in platelet poor plasma 

bycoagulation assay           34 s                      24-35        

 

                                        Blood lactic acid measurement (moles/vol

ume) - 20 09:40         

 

                    Blood lactic acid measurement (moles/volume)           2.07 

mmol/L           

0.50-2.00        

 

                                        Comprehensive metabolic panel - 20

 09:40         

 

                          Serum or plasma sodium measurement (moles/volume)     

      135 mmol/L          

                                        135-145        

 

                          Serum or plasma potassium measurement (moles/volume)  

         3.3 mmol/L       

                                        3.6-5.0        

 

                          Serum or plasma chloride measurement (moles/volume)   

        97 mmol/L         

                                                

 

                    Carbon dioxide           16 mmol/L           21-32        

 

                          Serum or plasma anion gap determination (moles/volume)

           22 mmol/L      

                                        5-14        

 

                          Serum or plasma urea nitrogen measurement (mass/volume

)           49 mg/dL      

                                        7-18        

 

                          Serum or plasma creatinine measurement (mass/volume)  

         2.95 mg/dL       

                                        0.60-1.30        

 

                    Serum or plasma urea nitrogen/creatinine mass ratio         

  17                  NRG 

       

 

                                        Serum or plasma creatinine measurement w

ith calculation of estimated glomerular 

filtration rate           16                        NRG        

 

                    Serum or plasma glucose measurement (mass/volume)           

60 mg/dL            

        

 

                    Serum or plasma calcium measurement (mass/volume)           

9.6 mg/dL           

8.5-10.1        

 

                          Serum or plasma total bilirubin measurement (mass/volu

me)           2.3 mg/dL   

                                        0.1-1.0        

 

                                        Serum or plasma alkaline phosphatase haylie

surement (enzymatic activity/volume)    

                          328 U/L                           

 

                                        Serum or plasma aspartate aminotransfera

se measurement (enzymatic 

activity/volume)           161 U/L                   5-34        

 

                                        Serum or plasma alanine aminotransferase

 measurement (enzymatic activity/volume)

                          74 U/L                    0-55        

 

                    Serum or plasma protein measurement (mass/volume)           

7.7 g/dL            

6.4-8.2        

 

                    Serum or plasma albumin measurement (mass/volume)           

3.0 g/dL            

3.2-4.5        

 

                    CALCIUM CORRECTED           10.4 mg/dL           8.5-10.1   

     

 

                                        Complete blood count (CBC) with automate

d white blood cell (WBC) differential - 

20 09:40         

 

                          Blood leukocytes automated count (number/volume)      

     18.8 10*3/uL         

                                        4.3-11.0        

 

                          Blood erythrocytes automated count (number/volume)    

       4.44 10*6/uL       

                                        4.35-5.85        

 

                    Venous blood hemoglobin measurement (mass/volume)           

12.6 g/dL           

11.5-16.0        

 

                    Blood hematocrit (volume fraction)           39 %           

     35-52        

 

                    Automated erythrocyte mean corpuscular volume           87 [

foz_us]           

80-99        

 

                                        Automated erythrocyte mean corpuscular h

emoglobin (mass per erythrocyte)        

                          28 pg                     25-34        

 

                                        Automated erythrocyte mean corpuscular h

emoglobin concentration measurement 

(mass/volume)             33 g/dL                   32-36        

 

                    Automated erythrocyte distribution width ratio           17.

3 %              10.0-

14.5        

 

                    Automated blood platelet count (count/volume)           223 

10*3/uL           

130-400        

 

                          Automated blood platelet mean volume measurement      

     10.9 [foz_us]        

                                        7.4-10.4        

 

                    Automated blood neutrophils/100 leukocytes           85 %   

             42-75       

 

 

                    Automated blood lymphocytes/100 leukocytes           10 %   

             12-44       

 

 

                    Blood monocytes/100 leukocytes           5 %                

 0-12        

 

                    Automated blood eosinophils/100 leukocytes           0 %    

             0-10        

 

                    Automated blood basophils/100 leukocytes           0 %      

           0-10        

 

                    Blood neutrophils automated count (number/volume)           

15.9 10*3           

1.8-7.8        

 

                    Blood lymphocytes automated count (number/volume)           

1.9 10*3            

1.0-4.0        

 

                    Blood monocytes automated count (number/volume)           0.

9 10*3            

0.0-1.0        

 

                    Automated eosinophil count           0.0 10*3/uL           0

.0-0.3        

 

                    Automated blood basophil count (count/volume)           0.0 

10*3/uL           

0.0-0.1        

 

                                        Manual absolute plasma cell count -  09:40         

 

                    Blood monocytes/100 leukocytes           5 %                

 NRG        

 

                    Manual blood segmented neutrophils/100 leukocytes           

69 %                NRG  

      

 

                    Blood band neutrophils/100 leukocytes           18 %        

        NRG        

 

                    Manual blood lymphocytes/100 leukocytes           7 %       

          NRG        

 

                    Manual eosinophils/100 leukocytes in nose           0 %     

            NRG        

 

                    Manual blood basophils/100 leukocytes           0 %         

        NRG        

 

                    Blood lymphocytes variant/100 leukocytes           1 %      

           NRG        

 

                    Blood anisocytosis detection by light microscopy           S

LIGHT              NRG

        

 

                    Blood toxic granules detection by light microscopy          

 2+                  NRG  

      

 

                    Blood maribel cells detection by light microscopy           SLI

GHT              NRG  

      

 

                                        Complete urinalysis with reflex to cultu

re - 20 10:49         

 

                    Urine color determination           YELLOW              NRG 

       

 

                    Urine clarity determination           CLOUDY              NR

G        

 

                    Urine pH measurement by test strip           5.0            

     5-9        

 

                    Specific gravity of urine by test strip           1.025     

          1.016-1.022  

      

 

                    Urine protein assay by test strip, semi-quantitative        

   3+                  

NEGATIVE        

 

                    Urine glucose detection by automated test strip           NE

GATIVE            

NEGATIVE        

 

                          Erythrocytes detection in urine sediment by light micr

oscopy           NEGATIVE 

                                        NEGATIVE        

 

                    Urine ketones detection by automated test strip           1+

                  NEGATIVE

        

 

                    Urine nitrite detection by test strip           NEGATIVE    

        NEGATIVE    

    

 

                    Urine total bilirubin detection by test strip           2+  

                NEGATIVE  

      

 

                          Urine urobilinogen measurement by automated test strip

 (mass/volume)           

1.0 mg/dL                               < = 1.0        

 

                    Urine leukocyte esterase detection by dipstick           NEG

ATIVE            

NEGATIVE        

 

                                        Automated urine sediment erythrocyte cou

nt by microscopy (number/high power 

field)                    NONE                      NRG        

 

                                        Automated urine sediment leukocyte count

 by microscopy (number/high power field)

                           [HPF]                    NRG        

 

                          Bacteria detection in urine sediment by light microsco

py           FEW          

                                        NRG        

 

                                        Squamous epithelial cells detection in u

rine sediment by light microscopy       

                          10-25                     NRG        

 

                          Crystals detection in urine sediment by light microsco

py           PRESENT      

                                        NRG        

 

                          Casts detection in urine sediment by light microscopy 

          PRESENT         

                                        NRG        

 

                          Mucus detection in urine sediment by light microscopy 

          MODERATE        

                                        NRG        

 

                    Complete urinalysis with reflex to culture           CULTURE

 PENDING            

NRG        

 

                          Amorphous sediment detection in urine sediment by ligh

t microscopy           MOD

 ROGER URATES                            NRG        

 

                          Hyaline casts detection in urine sediment by light samuel

roscopy           2-5     

                                        NRG        

 

                          Granular casts detection in urine sediment by light mi

croscopy           2-5    

                                        NRG        

 

                                        Complete blood count (CBC) with automate

d white blood cell (WBC) differential - 

20 13:20         

 

                          Blood leukocytes automated count (number/volume)      

     15.3 10*3/uL         

                                        4.3-11.0        

 

                          Blood erythrocytes automated count (number/volume)    

       4.16 10*6/uL       

                                        4.35-5.85        

 

                    Venous blood hemoglobin measurement (mass/volume)           

12.0 g/dL           

11.5-16.0        

 

                    Blood hematocrit (volume fraction)           38 %           

     35-52        

 

                    Automated erythrocyte mean corpuscular volume           92 [

foz_us]           

80-99        

 

                                        Automated erythrocyte mean corpuscular h

emoglobin (mass per erythrocyte)        

                          29 pg                     25-34        

 

                                        Automated erythrocyte mean corpuscular h

emoglobin concentration measurement 

(mass/volume)             31 g/dL                   32-36        

 

                    Automated erythrocyte distribution width ratio           17.

7 %              10.0-

14.5        

 

                    Automated blood platelet count (count/volume)           164 

10*3/uL           

130-400        

 

                          Automated blood platelet mean volume measurement      

     10.7 [foz_us]        

                                        7.4-10.4        

 

                    Automated blood neutrophils/100 leukocytes           84 %   

             42-75       

 

 

                    Automated blood lymphocytes/100 leukocytes           10 %   

             12-44       

 

 

                    Blood monocytes/100 leukocytes           5 %                

 0-12        

 

                    Automated blood eosinophils/100 leukocytes           0 %    

             0-10        

 

                    Automated blood basophils/100 leukocytes           0 %      

           0-10        

 

                    Blood neutrophils automated count (number/volume)           

12.8 10*3           

1.8-7.8        

 

                    Blood lymphocytes automated count (number/volume)           

1.6 10*3            

1.0-4.0        

 

                    Blood monocytes automated count (number/volume)           0.

8 10*3            

0.0-1.0        

 

                    Automated eosinophil count           0.0 10*3/uL           0

.0-0.3        

 

                    Automated blood basophil count (count/volume)           0.1 

10*3/uL           

0.0-0.1        

 

                          Blood blood smear finding identification by light micr

oscopy           YES      

                                        NRG        

 

                                        Serum or plasma lactate measurement (mol

es/volume) - 20 13:20         

 

                          Serum or plasma lactate measurement (moles/volume)    

       1.41 mmol/L        

                                        0.50-2.00        

 

                                        Comprehensive metabolic panel - 20

 13:20         

 

                          Serum or plasma sodium measurement (moles/volume)     

      132 mmol/L          

                                        135-145        

 

                          Serum or plasma potassium measurement (moles/volume)  

         3.2 mmol/L       

                                        3.6-5.0        

 

                          Serum or plasma chloride measurement (moles/volume)   

        100 mmol/L        

                                                

 

                    Carbon dioxide           15 mmol/L           21-32        

 

                          Serum or plasma anion gap determination (moles/volume)

           17 mmol/L      

                                        5-14        

 

                          Serum or plasma urea nitrogen measurement (mass/volume

)           49 mg/dL      

                                        7-18        

 

                          Serum or plasma creatinine measurement (mass/volume)  

         2.54 mg/dL       

                                        0.60-1.30        

 

                    Serum or plasma urea nitrogen/creatinine mass ratio         

  19                  NRG 

       

 

                                        Serum or plasma creatinine measurement w

ith calculation of estimated glomerular 

filtration rate           19                        NRG        

 

                    Serum or plasma glucose measurement (mass/volume)           

82 mg/dL            

        

 

                    Serum or plasma calcium measurement (mass/volume)           

9.4 mg/dL           

8.5-10.1        

 

                          Serum or plasma total bilirubin measurement (mass/volu

me)           1.9 mg/dL   

                                        0.1-1.0        

 

                                        Serum or plasma alkaline phosphatase haylie

surement (enzymatic activity/volume)    

                          268 U/L                           

 

                                        Serum or plasma aspartate aminotransfera

se measurement (enzymatic 

activity/volume)           132 U/L                   5-34        

 

                                        Serum or plasma alanine aminotransferase

 measurement (enzymatic activity/volume)

                          62 U/L                    0-55        

 

                    Serum or plasma protein measurement (mass/volume)           

6.6 g/dL            

6.4-8.2        

 

                    Serum or plasma albumin measurement (mass/volume)           

2.4 g/dL            

3.2-4.5        

 

                    CALCIUM CORRECTED           10.7 mg/dL           8.5-10.1   

     

 

                                        Serum or plasma phosphate measurement (m

ass/volume) - 20 13:20         

 

                          Serum or plasma phosphate measurement (mass/volume)   

        2.9 mg/dL         

                                        2.3-4.7        

 

                                        Magnesium - 20 13:20         

 

                    Magnesium           1.9 mg/dL           1.6-2.4        



                                                                          



Encounters

      



                ACCT No.           Visit Date/Time           Discharge          

 Status         

             Pt. Type           Provider           Facility           Loc./Unit 

          

Complaint        

 

                    [id number]           10/24/2019 12:47:00            00:01:00        

                DIS             Outpatient           LYNSEY SANTILLAN [facility]           ONC                                

 

                    [id number]           2019 16:52:00            23:59:59        

                [facility]             Outpatient           ZARA TORO MD          

 [facility]           LAB                       [facility]        

 

                    [id number]           2019 10:29:00            00:01:00        

                DIS             Outpatient           LYNSEY SANTILLAN [facility]           ONC                                

 

                    [id number]           2019 15:32:00            23:59:59        

                [facility]             Outpatient           ZARA TORO MD          

 [facility]           LAB                       LABS        

 

                    [id number]           2019 16:19:00            23:59:59        

                [facility]             Outpatient           ZARA TORO MD          

 [facility]           LAB                       [facility]   

     

 

                    [id number]           2019 09:42:00            00:01:00        

                DIS             Outpatient           LYNSEY SANTILLAN [facility]           ONC                                

 

                    [id number]           2019 15:22:00           

019 23:59:59        

                CLS             Preadmit           ROBINA QUEEN APRN     

      Via 

[facility]           RT                        RESTRICTIVE FERMIN

G DISEASE    

    

 

                    [id number]           2018 09:57:00           

018 23:59:59        

                CLS             Outpatient           TYRELLLYNSEY           V

ia [facility]           RAD                                

 

                    [id number]           2018 15:33:00           

018 23:59:59        

                CLS             Preadmit           ROBINA QUEEN APRN     

      Via 

[facility]           RAD                       DECREASED DIFFU

JF 

CAPACITY OF LUNG        

 

                    [id number]           2018 15:26:00           

018 23:59:59        

                CLS             Outpatient           ROBINA QUEEN APRN   

        Via 

[facility]           RAD                       DECREASED DIFFU

JF 

CAPACITY OF LUNG        

 

                    [id number]           2018 07:46:00           

018 23:59:59        

                CLS             Preadmit           CORTEZ MINA, ZARA B           V

ia [facility]           RAD                       INTERSITIAL LUNG DISEAS

E        

 

                    [id number]           09/10/2018 11:47:00           09/10/2

018 23:59:59        

                CLS             Outpatient           YENY DINH DO S   

        Via 

[facility]           CARD                      SYSTEMIC SCLERO

DERMA      

  

 

                    [id number]           2018 13:56:00           

018 23:59:59        

                CLS             Outpatient           WILLIAM MORENO MD         

  Via [facility]           LAB                       R94.5        

 

                    [id number]           2018 11:55:00           

018 23:59:59        

                CLS             Preadmit           CIARA ARCHER MD          

 Via [facility]           WOUNDCARE                          

 

                    [id number]           2018 15:28:00           

018 23:59:59        

                CLS             Outpatient           CIARA ARCHER MD        

   Via [facility]           LAB                       L97.312        

 

                    [id number]           2018 13:57:00           

018 23:59:59        

                CLS             Outpatient           CIARA ARCHER MD        

   Via [facility]           WOUNDCARE                          

 

                    [id number]           2018 14:31:00           03/12/2

018 23:59:59        

                CLS             Outpatient           CIARA ARCHER MD        

   [facility]                          

 

                    [id number]           2018 14:54:00            23:59:59        

                CLS             Outpatient           CIARA ARCHER MD        

   [facility]ia [facility]                          

 

                    [id number]           2018 08:10:00            23:59:59        

                CLS             Outpatient           CIARA ARCHER MD        

   [facility]ia [facility]                          

 

                    [id number]           2018 00:21:00            23:59:59        

                CLS             Preadmit           FABRICIO BOYER MD   

        Via 

[facility]           LAB                       M34.9        

 

                    [id number]           10/06/2017 12:35:00            00:01:00        

                DIS             Outpatient           FABRICIO BOYER MD 

          Via 

[facility]           LAB                       M34.9        

 

             [id number]           2020 10:55:00                        A

CT           

Inpatient                 YENY DINH DO           Via [facility] [address]                 ICU                       INFLUENZA B,ACUTE RENAL 

FAILURE,DEHYDRATION,UTI        

 

             [id number]           2020 00:00:00                         

            

Document Registration

## 2020-03-18 NOTE — DIAGNOSTIC IMAGING REPORT
INDICATION: Central line placement.



FINDINGS: Portable chest shows normal heart size and vascularity.

The lungs are clear. There is no effusion or pneumothorax. There

has been placement of central catheter from the right jugular

approach. Tip is in the SVC.



IMPRESSION: Catheter tip is in the SVC just above the right

atrium.



Dictated by: 



  Dictated on workstation # [id number]

## 2020-03-19 VITALS — DIASTOLIC BLOOD PRESSURE: 63 MMHG | SYSTOLIC BLOOD PRESSURE: 103 MMHG

## 2020-03-19 VITALS — DIASTOLIC BLOOD PRESSURE: 50 MMHG | SYSTOLIC BLOOD PRESSURE: 80 MMHG

## 2020-03-19 VITALS — DIASTOLIC BLOOD PRESSURE: 48 MMHG | SYSTOLIC BLOOD PRESSURE: 89 MMHG

## 2020-03-19 VITALS — SYSTOLIC BLOOD PRESSURE: 90 MMHG | DIASTOLIC BLOOD PRESSURE: 52 MMHG

## 2020-03-19 VITALS — DIASTOLIC BLOOD PRESSURE: 61 MMHG | SYSTOLIC BLOOD PRESSURE: 105 MMHG

## 2020-03-19 VITALS — SYSTOLIC BLOOD PRESSURE: 97 MMHG | DIASTOLIC BLOOD PRESSURE: 49 MMHG

## 2020-03-19 VITALS — DIASTOLIC BLOOD PRESSURE: 51 MMHG | SYSTOLIC BLOOD PRESSURE: 96 MMHG

## 2020-03-19 VITALS — SYSTOLIC BLOOD PRESSURE: 94 MMHG | DIASTOLIC BLOOD PRESSURE: 52 MMHG

## 2020-03-19 VITALS — SYSTOLIC BLOOD PRESSURE: 135 MMHG | DIASTOLIC BLOOD PRESSURE: 71 MMHG

## 2020-03-19 VITALS — DIASTOLIC BLOOD PRESSURE: 72 MMHG | SYSTOLIC BLOOD PRESSURE: 133 MMHG

## 2020-03-19 VITALS — DIASTOLIC BLOOD PRESSURE: 72 MMHG | SYSTOLIC BLOOD PRESSURE: 126 MMHG

## 2020-03-19 VITALS — SYSTOLIC BLOOD PRESSURE: 120 MMHG | DIASTOLIC BLOOD PRESSURE: 95 MMHG

## 2020-03-19 VITALS — DIASTOLIC BLOOD PRESSURE: 65 MMHG | SYSTOLIC BLOOD PRESSURE: 116 MMHG

## 2020-03-19 VITALS — DIASTOLIC BLOOD PRESSURE: 53 MMHG | SYSTOLIC BLOOD PRESSURE: 98 MMHG

## 2020-03-19 VITALS — DIASTOLIC BLOOD PRESSURE: 73 MMHG | SYSTOLIC BLOOD PRESSURE: 113 MMHG

## 2020-03-19 VITALS — SYSTOLIC BLOOD PRESSURE: 86 MMHG | DIASTOLIC BLOOD PRESSURE: 48 MMHG

## 2020-03-19 VITALS — SYSTOLIC BLOOD PRESSURE: 110 MMHG | DIASTOLIC BLOOD PRESSURE: 57 MMHG

## 2020-03-19 VITALS — SYSTOLIC BLOOD PRESSURE: 119 MMHG | DIASTOLIC BLOOD PRESSURE: 65 MMHG

## 2020-03-19 VITALS — SYSTOLIC BLOOD PRESSURE: 105 MMHG | DIASTOLIC BLOOD PRESSURE: 61 MMHG

## 2020-03-19 VITALS — DIASTOLIC BLOOD PRESSURE: 52 MMHG | SYSTOLIC BLOOD PRESSURE: 104 MMHG

## 2020-03-19 VITALS — DIASTOLIC BLOOD PRESSURE: 74 MMHG | SYSTOLIC BLOOD PRESSURE: 130 MMHG

## 2020-03-19 VITALS — DIASTOLIC BLOOD PRESSURE: 69 MMHG | SYSTOLIC BLOOD PRESSURE: 122 MMHG

## 2020-03-19 VITALS — SYSTOLIC BLOOD PRESSURE: 89 MMHG | DIASTOLIC BLOOD PRESSURE: 45 MMHG

## 2020-03-19 VITALS — SYSTOLIC BLOOD PRESSURE: 124 MMHG | DIASTOLIC BLOOD PRESSURE: 67 MMHG

## 2020-03-19 LAB
ALBUMIN SERPL-MCNC: 2.4 GM/DL (ref 3.2–4.5)
ALP SERPL-CCNC: 256 U/L (ref 40–136)
ALT SERPL-CCNC: 62 U/L (ref 0–55)
AMYLASE SERPL-CCNC: 9 U/L (ref 25–125)
BASOPHILS # BLD AUTO: 0 10^3/UL (ref 0–0.1)
BASOPHILS NFR BLD AUTO: 0 % (ref 0–10)
BILIRUB SERPL-MCNC: 1.7 MG/DL (ref 0.1–1)
BUN/CREAT SERPL: 21
CALCIUM SERPL-MCNC: 8.6 MG/DL (ref 8.5–10.1)
CHLORIDE SERPL-SCNC: 99 MMOL/L (ref 98–107)
CO2 SERPL-SCNC: 18 MMOL/L (ref 21–32)
CREAT SERPL-MCNC: 2.45 MG/DL (ref 0.6–1.3)
EOSINOPHIL # BLD AUTO: 0.1 10^3/UL (ref 0–0.3)
EOSINOPHIL NFR BLD AUTO: 0 % (ref 0–10)
ERYTHROCYTE [DISTWIDTH] IN BLOOD BY AUTOMATED COUNT: 17.4 % (ref 10–14.5)
GFR SERPLBLD BASED ON 1.73 SQ M-ARVRAT: 20 ML/MIN
GLUCOSE SERPL-MCNC: 92 MG/DL (ref 70–105)
HCT VFR BLD CALC: 30 % (ref 35–52)
HGB BLD-MCNC: 10 G/DL (ref 11.5–16)
LIPASE SERPL-CCNC: 9 U/L (ref 8–78)
LYMPHOCYTES # BLD AUTO: 2 X 10^3 (ref 1–4)
LYMPHOCYTES NFR BLD AUTO: 12 % (ref 12–44)
MAGNESIUM SERPL-MCNC: 1.8 MG/DL (ref 1.6–2.4)
MANUAL DIFFERENTIAL PERFORMED BLD QL: NO
MCH RBC QN AUTO: 29 PG (ref 25–34)
MCHC RBC AUTO-ENTMCNC: 33 G/DL (ref 32–36)
MCV RBC AUTO: 87 FL (ref 80–99)
MONOCYTES # BLD AUTO: 0.7 X 10^3 (ref 0–1)
MONOCYTES NFR BLD AUTO: 4 % (ref 0–12)
NEUTROPHILS # BLD AUTO: 13.7 X 10^3 (ref 1.8–7.8)
NEUTROPHILS NFR BLD AUTO: 83 % (ref 42–75)
PHOSPHATE SERPL-MCNC: 2.5 MG/DL (ref 2.3–4.7)
PLATELET # BLD: 156 10^3/UL (ref 130–400)
PMV BLD AUTO: 10.2 FL (ref 7.4–10.4)
POTASSIUM SERPL-SCNC: 2.8 MMOL/L (ref 3.6–5)
PROT SERPL-MCNC: 5.8 GM/DL (ref 6.4–8.2)
SODIUM SERPL-SCNC: 131 MMOL/L (ref 135–145)
WBC # BLD AUTO: 16.5 10^3/UL (ref 4.3–11)

## 2020-03-19 RX ADMIN — POTASSIUM CHLORIDE SCH MLS/HR: 200 INJECTION, SOLUTION INTRAVENOUS at 06:39

## 2020-03-19 RX ADMIN — SODIUM CHLORIDE SCH MLS/HR: 900 INJECTION INTRAVENOUS at 13:00

## 2020-03-19 RX ADMIN — VASOPRESSIN SCH MLS/HR: 20 INJECTION INTRAVENOUS at 06:06

## 2020-03-19 RX ADMIN — POTASSIUM CHLORIDE SCH MLS/HR: 200 INJECTION, SOLUTION INTRAVENOUS at 05:53

## 2020-03-19 RX ADMIN — IPRATROPIUM BROMIDE AND ALBUTEROL SULFATE SCH ML: .5; 3 SOLUTION RESPIRATORY (INHALATION) at 02:07

## 2020-03-19 RX ADMIN — POTASSIUM CHLORIDE SCH MEQ: 1500 TABLET, EXTENDED RELEASE ORAL at 06:06

## 2020-03-19 RX ADMIN — VASOPRESSIN SCH MLS/HR: 20 INJECTION INTRAVENOUS at 22:54

## 2020-03-19 RX ADMIN — POTASSIUM CHLORIDE SCH MLS/HR: 200 INJECTION, SOLUTION INTRAVENOUS at 13:14

## 2020-03-19 RX ADMIN — SODIUM CHLORIDE, SODIUM LACTATE, POTASSIUM CHLORIDE, AND CALCIUM CHLORIDE SCH MLS/HR: 600; 310; 30; 20 INJECTION, SOLUTION INTRAVENOUS at 06:35

## 2020-03-19 RX ADMIN — MORPHINE SULFATE PRN MG: 4 INJECTION, SOLUTION INTRAMUSCULAR; INTRAVENOUS at 05:52

## 2020-03-19 RX ADMIN — IPRATROPIUM BROMIDE AND ALBUTEROL SULFATE SCH ML: .5; 3 SOLUTION RESPIRATORY (INHALATION) at 11:51

## 2020-03-19 RX ADMIN — SODIUM CHLORIDE, SODIUM LACTATE, POTASSIUM CHLORIDE, AND CALCIUM CHLORIDE SCH MLS/HR: 600; 310; 30; 20 INJECTION, SOLUTION INTRAVENOUS at 12:40

## 2020-03-19 RX ADMIN — Medication SCH MLS/HR: at 12:52

## 2020-03-19 RX ADMIN — MORPHINE SULFATE PRN MG: 4 INJECTION, SOLUTION INTRAMUSCULAR; INTRAVENOUS at 21:13

## 2020-03-19 RX ADMIN — IPRATROPIUM BROMIDE AND ALBUTEROL SULFATE SCH ML: .5; 3 SOLUTION RESPIRATORY (INHALATION) at 21:51

## 2020-03-19 RX ADMIN — HYDROCORTISONE SODIUM SUCCINATE SCH MG: 100 INJECTION, POWDER, FOR SOLUTION INTRAMUSCULAR; INTRAVENOUS at 13:14

## 2020-03-19 RX ADMIN — Medication SCH MLS/HR: at 19:42

## 2020-03-19 RX ADMIN — POTASSIUM CHLORIDE SCH MLS/HR: 200 INJECTION, SOLUTION INTRAVENOUS at 13:15

## 2020-03-19 RX ADMIN — POTASSIUM CHLORIDE SCH MLS/HR: 200 INJECTION, SOLUTION INTRAVENOUS at 06:06

## 2020-03-19 RX ADMIN — IPRATROPIUM BROMIDE AND ALBUTEROL SULFATE SCH ML: .5; 3 SOLUTION RESPIRATORY (INHALATION) at 18:52

## 2020-03-19 RX ADMIN — IPRATROPIUM BROMIDE AND ALBUTEROL SULFATE SCH ML: .5; 3 SOLUTION RESPIRATORY (INHALATION) at 06:59

## 2020-03-19 RX ADMIN — MAGNESIUM SULFATE IN DEXTROSE SCH MLS/HR: 10 INJECTION, SOLUTION INTRAVENOUS at 06:06

## 2020-03-19 RX ADMIN — HYDROCORTISONE SODIUM SUCCINATE SCH MG: 100 INJECTION, POWDER, FOR SOLUTION INTRAMUSCULAR; INTRAVENOUS at 21:12

## 2020-03-19 RX ADMIN — VASOPRESSIN SCH MLS/HR: 20 INJECTION INTRAVENOUS at 13:15

## 2020-03-19 RX ADMIN — OSELTAMIVIR PHOSPHATE SCH MG: 30 CAPSULE ORAL at 08:19

## 2020-03-19 RX ADMIN — IPRATROPIUM BROMIDE AND ALBUTEROL SULFATE SCH ML: .5; 3 SOLUTION RESPIRATORY (INHALATION) at 14:03

## 2020-03-19 RX ADMIN — SODIUM CHLORIDE, SODIUM LACTATE, POTASSIUM CHLORIDE, AND CALCIUM CHLORIDE SCH MLS/HR: 600; 310; 30; 20 INJECTION, SOLUTION INTRAVENOUS at 13:16

## 2020-03-19 RX ADMIN — SODIUM CHLORIDE, SODIUM LACTATE, POTASSIUM CHLORIDE, AND CALCIUM CHLORIDE SCH MLS/HR: 600; 310; 30; 20 INJECTION, SOLUTION INTRAVENOUS at 19:42

## 2020-03-19 NOTE — PULMONARY PROGRESS NOTE
Subjective


Time Seen by a Provider:  05:29





Sepsis Event


Evaluation


Height, Weight, BMI


Height: '"


Weight: lbs. oz. kg; 25.17 BMI


Method:





Focused Exam


Lactate Level


3/18/20 09:40: Lactic Acid Level 2.07*H


3/18/20 13:20: Lactic Acid Level 1.41





Exam


Exam





Vital Signs








  Date Time  Temp Pulse Resp B/P (MAP) Pulse Ox O2 Delivery O2 Flow Rate FiO2


 


3/19/20 05:00  118 24 104/52 (69) 94 Room Air  


 


3/19/20 04:03 36.5       


 


3/19/20 04:00      Nasal Cannula 2.00 


 


3/19/20 04:00  121 16 105/61 (76) 97 Room Air  


 


3/19/20 03:00  121 18 98/53 (68) 94 Room Air  


 


3/19/20 02:12      Room Air  


 


3/19/20 02:09     95 Nasal Cannula 2.00 


 


3/19/20 02:00  112 15 110/57 (74) 97 Nasal Cannula 2.00 


 


3/19/20 01:00  111 17 94/52 (66) 97 Nasal Cannula 2.00 


 


3/19/20 01:00  113      


 


3/19/20 00:00 36.5       


 


3/19/20 00:00  113 17 103/63 (76) 95 Nasal Cannula 2.00 


 


3/18/20 23:59      Nasal Cannula 2.00 


 


3/18/20 23:00  118 30 115/61 (79) 92 Nasal Cannula 2.00 


 


3/18/20 22:23     94 Nasal Cannula 2.00 


 


3/18/20 22:00  107 18 102/71 (81) 93 Nasal Cannula 2.00 


 


3/18/20 21:00  105 16 101/68 (79) 97 Nasal Cannula 2.00 


 


3/18/20 20:00 37.1       


 


3/18/20 20:00  110 17 92/60 (71) 96 Nasal Cannula 2.00 


 


3/18/20 20:00      Nasal Cannula 2.00 


 


3/18/20 19:00  113      


 


3/18/20 19:00  112 15 90/52 (65) 95 Nasal Cannula 2.00 


 


3/18/20 18:56     95 Nasal Cannula 2.00 


 


3/18/20 18:00  110 20 92/60 (71) 95 Nasal Cannula 2.00 


 


3/18/20 17:00  110 19 90/53 (65) 97 Nasal Cannula 2.00 


 


3/18/20 16:38      Nasal Cannula 2.00 


 


3/18/20 16:00 36.6       


 


3/18/20 16:00  108 19 84/52 (63) 97 Nasal Cannula 2.00 


 


3/18/20 15:00  112 26 98/58 (71) 95 Nasal Cannula 2.00 


 


3/18/20 14:00  115 13 97/57 (70) 96 Nasal Cannula 2.00 


 


3/18/20 13:00  122 36 89/58 (68) 94 Nasal Cannula 2.00 


 


3/18/20 12:56  123      


 


3/18/20 12:21  90 18 90/62 98 Nasal Cannula 2.00 


 


3/18/20 09:20     88 Nasal Cannula 2.00 


 


3/18/20 09:20 37.3 99 18 99/55 (70) 88 Room Air  














I & O 


 


 3/19/20





 07:00


 


Intake Total 3220 ml


 


Output Total 195 ml


 


Balance 3025 ml








Height & Weight


Height: '"


Weight: lbs. oz. kg; 25.17 BMI


Method:


General Appearance:  Moderate Distress


HEENT:  Other (Mucous membranes dry)


Neck:  Supple


Respiratory:  Lungs Clear


Cardiovascular:  Gallop/S4, Tachycardia


Capillary Refill:  Greater Than 3 Seconds


Gastrointestinal:  non tender, soft


Extremity:  Non Tender, No Calf Tenderness, No Pedal Edema


Neurologic/Psychiatric:  Alert, Oriented x3


Skin:  Warm/Dry, Other (sores to hands and bilateral ankles/feet)





Results


Lab


Laboratory Tests


3/18/20 09:40








3/18/20 13:20








3/19/20 03:28











Assessment/Plan


Assessment/Plan


Influenza B 


   -Tamiflu 


Metabolic lactic acidosis


   -IVF 


Dehydration 


   [facility] 


Hypokalemia 


   -replace











CATERINA MCCOY DO              Mar 19, 2020 05:29

## 2020-03-19 NOTE — DIAGNOSTIC IMAGING REPORT
INDICATION: Dyspnea.



COMPARISON: 03/18/2020



TECHNIQUE: Single radiograph of the chest dated 03/19/2020.



FINDINGS: Right IJ central venous catheter is again identified,

appearing stable. The cardiac silhouette is enlarged, similar to

the prior examination. Mild central pulmonary vascular

congestion. Elevation of the right hemidiaphragm. Low lung

volumes with increasing perihilar opacities. No large-volume

pleural effusion. No pneumothorax. No acute osseous abnormality.



IMPRESSION: Low lung volumes, decreased from the prior

examination.



Prominence of the cardiac silhouette with associated prominence

of the pulmonary vasculature and perihilar opacities. This may

simply relate to accentuation secondary to low lung volumes,

though mild developing volume overload/congestive heart failure

would be additional consideration. Recommend clinical correlation

and continued radiographic follow-up.



Dictated by: 



  Dictated on workstation # [id number]

## 2020-03-19 NOTE — NUR
PT HAS REFUSED BREATHING TREATMENT. PT HAS FAMILY AT BEDSIDE. PT IS SLEEPING COMFORTABLY. PT 
IS IN NO SIGNS OF RESPIRATORY DISTRESS. 

-------------------------------------------------------------------------------

Addendum: 03/19/20 at 1404 by NICHELLE COREA RT

-------------------------------------------------------------------------------

Amended: Links added.

## 2020-03-19 NOTE — NUR
patients right shoulder is sore, she refuses to lay on right side. 

patients  reports that her shoulder started hurting at home a few days ago at home 
after transferring to the commode.

## 2020-03-19 NOTE — NUR
THIS RN CONTACTED DR. DINH ABOUT PT SWALLOW STUDY AND DIET ORDER. THIS RN NOTIFIED DR. DINH THAT PT DID NOT RECEIVE SWALLOW STUDY DUE TO PT LOC. THIS RN REQUESTED AN ORDER FOR 
NPO. NEW ORDERS OBTAINED, SEE ORDER HX.

## 2020-03-19 NOTE — PROGRESS NOTE
Subjective


Date Seen by a Provider:  Mar 19, 2020


Time Seen by a Provider:  20:38


Subjective/Events-last exam


Fwup Influenza B, severe dehydration with acute renal failure, metabolic 

acidosis, scleroderma.  Patient awakens but not speaking today.  BP was low this

morning but has been off pressors.





Focused Exam


Lactate Level


3/18/20 09:40: Lactic Acid Level 2.07*H


3/18/20 13:20: Lactic Acid Level 1.41





Objective


Exam





Vital Signs








  Date Time  Temp Pulse Resp B/P (MAP) Pulse Ox O2 Delivery O2 Flow Rate FiO2


 


3/19/20 19:18 36.4       


 


3/19/20 18:52     100 Room Air  


 


3/19/20 18:00  85 10 122/69 (86) 98 Room Air  


 


3/19/20 17:00  92 11 113/73 (86) 98 Room Air  


 


3/19/20 16:00  87 12 124/67 (86) 97 Room Air  


 


3/19/20 15:36 36.7       


 


3/19/20 15:00  91 13 135/71 (92) 98 Room Air  


 


3/19/20 14:27     100 Nasal Cannula 2.00 


 


3/19/20 14:00  100 13 90/52 (65) 95 Room Air  


 


3/19/20 13:00  101 13 105/61 (76) 96 Room Air  


 


3/19/20 12:42  105      


 


3/19/20 12:00      Room Air  


 


3/19/20 12:00  105 15 120/95 (103) 95 Room Air  


 


3/19/20 11:58 36.2       


 


3/19/20 11:00  103 13 97/49 (65) 95 Room Air  


 


3/19/20 10:00  105 14 89/48 (62) 96 Room Air  


 


3/19/20 09:00  110 14 86/48 (61) 95 Room Air  


 


3/19/20 08:00      Room Air  


 


3/19/20 08:00  118 17 89/45 (60) 95 Room Air  


 


3/19/20 07:03     100 Nasal Cannula 2.00 


 


3/19/20 07:00  112 15 80/50 (60) 96 Room Air  


 


3/19/20 06:37  113      


 


3/19/20 06:00  113 16 96/51 (66) 94 Room Air  


 


3/19/20 05:00  118 24 104/52 (69) 94 Room Air  


 


3/19/20 04:03 36.5       


 


3/19/20 04:00      Nasal Cannula 2.00 


 


3/19/20 04:00  121 16 105/61 (76) 97 Room Air  


 


3/19/20 03:00  121 18 98/53 (68) 94 Room Air  


 


3/19/20 02:12      Room Air  


 


3/19/20 02:09     95 Nasal Cannula 2.00 


 


3/19/20 02:00  112 15 110/57 (74) 97 Nasal Cannula 2.00 


 


3/19/20 01:00  111 17 94/52 (66) 97 Nasal Cannula 2.00 


 


3/19/20 01:00  113      


 


3/19/20 00:00 36.5       


 


3/19/20 00:00  113 17 103/63 (76) 95 Nasal Cannula 2.00 


 


3/18/20 23:59      Nasal Cannula 2.00 


 


3/18/20 23:00  118 30 115/61 (79) 92 Nasal Cannula 2.00 


 


3/18/20 22:23     94 Nasal Cannula 2.00 


 


3/18/20 22:00  107 18 102/71 (81) 93 Nasal Cannula 2.00 


 


3/18/20 21:00  105 16 101/68 (79) 97 Nasal Cannula 2.00 














I & O 


 


 3/19/20





 07:00


 


Intake Total 3220 ml


 


Output Total 415 ml


 


Balance 2805 ml





Capillary Refill : Greater Than 3 Seconds


General Appearance:  Moderate Distress


Neck:  Supple


Respiratory:  Lungs Clear


Cardiovascular:  Regular Rate, Rhythm


Gastrointestinal:  normal bowel sounds, non tender, soft, distended


Extremity:  Non Tender, No Calf Tenderness, No Pedal Edema


Neurologic/Psychiatric:  Alert, Other (unable to get words out)


Skin:  Warm/Dry





Results


Lab


Laboratory Tests


3/19/20 00:43: Glucometer 100


3/19/20 03:28: 


White Blood Count 16.5H, Red Blood Count 3.49L, Hemoglobin 10.0L, Hematocrit 30L

, Mean Corpuscular Volume 87, Mean Corpuscular Hemoglobin 29, Mean Corpuscular 

Hemoglobin Concent 33, Red Cell Distribution Width 17.4H, Platelet Count 156, 

Mean Platelet Volume 10.2, Neutrophils (%) (Auto) 83H, Lymphocytes (%) (Auto) 

12, Monocytes (%) (Auto) 4, Eosinophils (%) (Auto) 0, Basophils (%) (Auto) 0, 

Neutrophils # (Auto) 13.7H, Lymphocytes # (Auto) 2.0, Monocytes # (Auto) 0.7, 

Eosinophils # (Auto) 0.1, Basophils # (Auto) 0.0, Sodium Level 131L, Potassium 

Level 2.8L, Chloride Level 99, Carbon Dioxide Level 18L, Anion Gap 14, Blood 

Urea Nitrogen 51H, Creatinine 2.45H, Estimat Glomerular Filtration Rate 20, 

BUN/Creatinine Ratio 21, Glucose Level 92, Calcium Level 8.6, Corrected Calcium 

9.9, Phosphorus Level 2.5, Magnesium Level 1.8, Total Bilirubin 1.7H, Aspartate 

Amino Transf (AST/SGOT) 127H, Alanine Aminotransferase (ALT/SGPT) 62H, Alkaline 

Phosphatase 256H, Total Protein 5.8L, Albumin 2.4L, Amylase Level 9L, Lipase 9





Microbiology


3/18/20 Gram Stain - Final, Resulted


          


3/18/20 Sputum Culture - Preliminary, Resulted


          Usual upper respiratory candice


          Moraxella catarrhalis


          Staphylococcus aureus


3/18/20 Urine Culture - Final, Complete


          NO GROWTH


3/18/20 Blood Culture - Preliminary, Resulted


          No growth





Assessment/Plan


Assessment/Plan


Assess & Plan/Chief Complaint


1.  Influenza B--on tamiflu


2.  Severe Dehydration with Acute Renal Failure--on IVFs, monitor Cr


3.  Sepsis with Staph Aureus--on Cefepime, will start hydrocortisone


4.  Scleroderma--hydrocortisone, did discuss code status with  and he 

states that her kids want her to be a full code--I explained to him that it is 

not want her kids want, it is what she wants and that the scleroderma could 

affect the lungs and she may not come off a ventilator if she had to go on one 

but he wished to keep her a full code


5.  Hypotension--off levaphed and will see if improves with steroid dosing


6.  Metabolic Acidosis--monitor with treatment of infections





Clinical Quality Measures


Admission Status


Admission Dx


1.  Influenza B--treat with tamiflu


2.  Severe Dehydration with Acute Renal Failure--aggressive IVF rehydration and 

monitor BUN/Cr


3.  Hypotension--aggressive hydration and pressors if needed


4.  Metabolic Acidosis with Electrolyte Imbalance--Hydrate and replace 

electrolytes as needed


5.  Bacturia--culture urine and cover with maxipime until cultures final


6.  Scleroderma with Vasculitis--patient has not been on any immunosuppressive 

therapy or steroids for at least 3-4mos





DVT/VTE Risk/Contraindication:


Risk Factor Score Per Nursin


RFS Level Per Nursing on Admit:  4+=Very High











YENY DINH DO        Mar 19, 2020 20:44

## 2020-03-19 NOTE — NUR
PT REFUSED MEDICATION AT THIS TIME.

-------------------------------------------------------------------------------

Addendum: 03/19/20 at 1151 by NICHELLE COREA RT

-------------------------------------------------------------------------------

Amended: Links added.

## 2020-03-19 NOTE — SPEECH THERAPY PROGRESS NOTE
Therapy Progress Note


ST attempted to complete Bedside Dysphagia Evaluation. Patient refused a sip of 

liquids. Nursing reports patient had just been given pain meds and may be able 

to participate at a later time. ST to follow up later this date.











RACHID ROSAS            Mar 19, 2020 08:28

## 2020-03-20 VITALS — DIASTOLIC BLOOD PRESSURE: 63 MMHG | SYSTOLIC BLOOD PRESSURE: 127 MMHG

## 2020-03-20 VITALS — DIASTOLIC BLOOD PRESSURE: 61 MMHG | SYSTOLIC BLOOD PRESSURE: 98 MMHG

## 2020-03-20 VITALS — DIASTOLIC BLOOD PRESSURE: 66 MMHG | SYSTOLIC BLOOD PRESSURE: 123 MMHG

## 2020-03-20 VITALS — DIASTOLIC BLOOD PRESSURE: 65 MMHG | SYSTOLIC BLOOD PRESSURE: 115 MMHG

## 2020-03-20 VITALS — DIASTOLIC BLOOD PRESSURE: 71 MMHG | SYSTOLIC BLOOD PRESSURE: 115 MMHG

## 2020-03-20 VITALS — SYSTOLIC BLOOD PRESSURE: 113 MMHG | DIASTOLIC BLOOD PRESSURE: 68 MMHG

## 2020-03-20 VITALS — DIASTOLIC BLOOD PRESSURE: 75 MMHG | SYSTOLIC BLOOD PRESSURE: 150 MMHG

## 2020-03-20 VITALS — SYSTOLIC BLOOD PRESSURE: 164 MMHG | DIASTOLIC BLOOD PRESSURE: 81 MMHG

## 2020-03-20 VITALS — DIASTOLIC BLOOD PRESSURE: 80 MMHG | SYSTOLIC BLOOD PRESSURE: 140 MMHG

## 2020-03-20 VITALS — DIASTOLIC BLOOD PRESSURE: 78 MMHG | SYSTOLIC BLOOD PRESSURE: 159 MMHG

## 2020-03-20 VITALS — DIASTOLIC BLOOD PRESSURE: 71 MMHG | SYSTOLIC BLOOD PRESSURE: 138 MMHG

## 2020-03-20 VITALS — DIASTOLIC BLOOD PRESSURE: 58 MMHG | SYSTOLIC BLOOD PRESSURE: 144 MMHG

## 2020-03-20 VITALS — DIASTOLIC BLOOD PRESSURE: 63 MMHG | SYSTOLIC BLOOD PRESSURE: 124 MMHG

## 2020-03-20 VITALS — DIASTOLIC BLOOD PRESSURE: 80 MMHG | SYSTOLIC BLOOD PRESSURE: 130 MMHG

## 2020-03-20 VITALS — SYSTOLIC BLOOD PRESSURE: 133 MMHG | DIASTOLIC BLOOD PRESSURE: 73 MMHG

## 2020-03-20 VITALS — SYSTOLIC BLOOD PRESSURE: 129 MMHG | DIASTOLIC BLOOD PRESSURE: 95 MMHG

## 2020-03-20 VITALS — DIASTOLIC BLOOD PRESSURE: 75 MMHG | SYSTOLIC BLOOD PRESSURE: 154 MMHG

## 2020-03-20 VITALS — DIASTOLIC BLOOD PRESSURE: 55 MMHG | SYSTOLIC BLOOD PRESSURE: 148 MMHG

## 2020-03-20 VITALS — DIASTOLIC BLOOD PRESSURE: 73 MMHG | SYSTOLIC BLOOD PRESSURE: 134 MMHG

## 2020-03-20 VITALS — SYSTOLIC BLOOD PRESSURE: 119 MMHG | DIASTOLIC BLOOD PRESSURE: 72 MMHG

## 2020-03-20 LAB
ALBUMIN SERPL-MCNC: 2.3 GM/DL (ref 3.2–4.5)
ALP SERPL-CCNC: 273 U/L (ref 40–136)
ALT SERPL-CCNC: 94 U/L (ref 0–55)
BILIRUB DIRECT SERPL-MCNC: 1.5 MG/DL (ref 0–0.3)
BILIRUB SERPL-MCNC: 1.7 MG/DL (ref 0.1–1)
BUN/CREAT SERPL: 31
CALCIUM SERPL-MCNC: 8.4 MG/DL (ref 8.5–10.1)
CHLORIDE SERPL-SCNC: 103 MMOL/L (ref 98–107)
CO2 SERPL-SCNC: 17 MMOL/L (ref 21–32)
CREAT SERPL-MCNC: 1.63 MG/DL (ref 0.6–1.3)
GFR SERPLBLD BASED ON 1.73 SQ M-ARVRAT: 33 ML/MIN
GLUCOSE SERPL-MCNC: 107 MG/DL (ref 70–105)
MAGNESIUM SERPL-MCNC: 2.1 MG/DL (ref 1.6–2.4)
PHOSPHATE SERPL-MCNC: 3.3 MG/DL (ref 2.3–4.7)
POTASSIUM SERPL-SCNC: 4.1 MMOL/L (ref 3.6–5)
PROT SERPL-MCNC: 5.9 GM/DL (ref 6.4–8.2)
SODIUM SERPL-SCNC: 133 MMOL/L (ref 135–145)

## 2020-03-20 RX ADMIN — VASOPRESSIN SCH MLS/HR: 20 INJECTION INTRAVENOUS at 15:21

## 2020-03-20 RX ADMIN — SODIUM CHLORIDE SCH MLS/HR: 900 INJECTION INTRAVENOUS at 00:04

## 2020-03-20 RX ADMIN — SODIUM CHLORIDE, SODIUM LACTATE, POTASSIUM CHLORIDE, AND CALCIUM CHLORIDE SCH MLS/HR: 600; 310; 30; 20 INJECTION, SOLUTION INTRAVENOUS at 08:53

## 2020-03-20 RX ADMIN — IPRATROPIUM BROMIDE AND ALBUTEROL SULFATE SCH ML: .5; 3 SOLUTION RESPIRATORY (INHALATION) at 22:53

## 2020-03-20 RX ADMIN — MORPHINE SULFATE PRN MG: 4 INJECTION, SOLUTION INTRAMUSCULAR; INTRAVENOUS at 21:59

## 2020-03-20 RX ADMIN — POTASSIUM CHLORIDE SCH MLS/HR: 200 INJECTION, SOLUTION INTRAVENOUS at 03:54

## 2020-03-20 RX ADMIN — VASOPRESSIN SCH MLS/HR: 20 INJECTION INTRAVENOUS at 08:53

## 2020-03-20 RX ADMIN — OSELTAMIVIR PHOSPHATE SCH MG: 30 CAPSULE ORAL at 08:54

## 2020-03-20 RX ADMIN — IPRATROPIUM BROMIDE AND ALBUTEROL SULFATE SCH ML: .5; 3 SOLUTION RESPIRATORY (INHALATION) at 18:51

## 2020-03-20 RX ADMIN — IPRATROPIUM BROMIDE AND ALBUTEROL SULFATE SCH ML: .5; 3 SOLUTION RESPIRATORY (INHALATION) at 15:32

## 2020-03-20 RX ADMIN — IPRATROPIUM BROMIDE AND ALBUTEROL SULFATE SCH ML: .5; 3 SOLUTION RESPIRATORY (INHALATION) at 08:12

## 2020-03-20 RX ADMIN — SODIUM CHLORIDE, SODIUM LACTATE, POTASSIUM CHLORIDE, AND CALCIUM CHLORIDE SCH MLS/HR: 600; 310; 30; 20 INJECTION, SOLUTION INTRAVENOUS at 16:58

## 2020-03-20 RX ADMIN — Medication SCH MLS/HR: at 04:26

## 2020-03-20 RX ADMIN — IPRATROPIUM BROMIDE AND ALBUTEROL SULFATE SCH ML: .5; 3 SOLUTION RESPIRATORY (INHALATION) at 02:25

## 2020-03-20 RX ADMIN — ONDANSETRON PRN MG: 2 INJECTION, SOLUTION INTRAMUSCULAR; INTRAVENOUS at 15:22

## 2020-03-20 RX ADMIN — PANTOPRAZOLE SODIUM SCH MG: 40 INJECTION, POWDER, FOR SOLUTION INTRAVENOUS at 21:58

## 2020-03-20 RX ADMIN — MORPHINE SULFATE PRN MG: 4 INJECTION, SOLUTION INTRAMUSCULAR; INTRAVENOUS at 05:57

## 2020-03-20 RX ADMIN — IPRATROPIUM BROMIDE AND ALBUTEROL SULFATE SCH ML: .5; 3 SOLUTION RESPIRATORY (INHALATION) at 11:07

## 2020-03-20 RX ADMIN — SODIUM CHLORIDE, SODIUM LACTATE, POTASSIUM CHLORIDE, AND CALCIUM CHLORIDE SCH MLS/HR: 600; 310; 30; 20 INJECTION, SOLUTION INTRAVENOUS at 02:20

## 2020-03-20 RX ADMIN — OSELTAMIVIR PHOSPHATE SCH MG: 30 CAPSULE ORAL at 21:51

## 2020-03-20 RX ADMIN — POTASSIUM CHLORIDE SCH MEQ: 1500 TABLET, EXTENDED RELEASE ORAL at 03:54

## 2020-03-20 RX ADMIN — VASOPRESSIN SCH MLS/HR: 20 INJECTION INTRAVENOUS at 23:34

## 2020-03-20 RX ADMIN — MAGNESIUM SULFATE IN DEXTROSE SCH MLS/HR: 10 INJECTION, SOLUTION INTRAVENOUS at 03:54

## 2020-03-20 RX ADMIN — HYDROCORTISONE SODIUM SUCCINATE SCH MG: 100 INJECTION, POWDER, FOR SOLUTION INTRAMUSCULAR; INTRAVENOUS at 21:58

## 2020-03-20 RX ADMIN — HYDROCORTISONE SODIUM SUCCINATE SCH MG: 100 INJECTION, POWDER, FOR SOLUTION INTRAMUSCULAR; INTRAVENOUS at 12:56

## 2020-03-20 RX ADMIN — Medication SCH MLS/HR: at 12:59

## 2020-03-20 RX ADMIN — SODIUM CHLORIDE SCH MLS/HR: 900 INJECTION INTRAVENOUS at 12:55

## 2020-03-20 RX ADMIN — HYDROCORTISONE SODIUM SUCCINATE SCH MG: 100 INJECTION, POWDER, FOR SOLUTION INTRAMUSCULAR; INTRAVENOUS at 05:57

## 2020-03-20 NOTE — NUR
This nurse called  and updated her that the results from the CT scan have been 
reported. I also updated her that I just got back with patient from MRI.

## 2020-03-20 NOTE — DIAGNOSTIC IMAGING REPORT
PROCEDURE: US Venous Lower Ext [place].



TECHNIQUE: Multiple real-time grayscale images were obtained over

the lower extremities in various projections, bilaterally.

Additional duplex Doppler and color Doppler images were also

obtained.



INDICATION: Acute stroke.



COMPARISON:  None



FINDINGS: 



The bilateral common femoral vein, femoral vein, deep femoral

vein, and popliteal vein are normal in appearance.  These vessels

show normal compressibility, color flow and doppler augmentation.

 The visualized deep calf veins demonstrate no distinct

intraluminal thrombus. 



IMPRESSION: 

1. No sonographic evidence of deep venous thrombosis in the

bilateral lower extremities.







Dictated by: 



  Dictated on workstation # [id number]

## 2020-03-20 NOTE — DIAGNOSTIC IMAGING REPORT
Portable erect AP chest at 216 hours.



INDICATION: Dyspnea.



FINDINGS: The heart size is stable when compared to the prior

exam of 03/19/2020. The central pulmonary vascularity remains

somewhat prominent. No new areas of pneumonia has developed since

the prior exam and there is no significant pleural effusion. The

mediastinum is not widened. The osseous structures are intact.

The central venous catheter on the right is unchanged in

position.



IMPRESSION: Stable chest. There has been no adverse change since

the prior exam. A follow-up study would be recommended for

continued evaluation.



Dictated by: 



  Dictated on workstation # [id number]

## 2020-03-20 NOTE — NUR
CM/SS:  Unable to visit with pt, due to her current status, no family present.  This worker 
will follow up as per consult related to a home health care new need.

## 2020-03-20 NOTE — OPERATIVE REPORT
DATE OF SERVICE:  03/18/2020



PREOPERATIVE DIAGNOSES:

Poor venous access, scleroderma.



POSTOPERATIVE DIAGNOSES:

Poor venous access, scleroderma.



PROCEDURE:

Right internal jugular vein ultrasound-guided central line placement.



SURGEON:

Ricki Dunlap DO



ANESTHESIA:

A 1% lidocaine 3 mL.



ESTIMATED BLOOD LOSS:

Minimal.



COMPLICATIONS:

None.



INDICATIONS:

The patient is a 59-year-old female with scleroderma, needing central line

placed.  She has had attempted PICC line that was unsuccessful.  She is 
Influenza B

positive and has dehydration and acute renal failure needing central line,

possible pressor support.  The patient understands risks and benefits of

procedure and wished to proceed with procedure.  Consent was signed in the

chart.



DESCRIPTION OF PROCEDURE:

The patient was prepped and draped in sterile fashion.  Timeout was performed. 

Ultrasound was used to isolate the right internal jugular vein.  Local

anesthetic was infiltrated and the right internal jugular vein was then accessed

under ultrasound guidance.  Dark nonpulsatile blood was withdrawn.  The wire was

inserted through the needle and the needle was removed.  An #11 blade scalpel

was used to make a skin incision at the insertion point.  A dilator was advanced

over the guidewire and removed, and the triple lumen catheter was inserted to

the sheath over the guidewire and the wire was removed.  The catheter was

secured in the usual fashion and all ports were accessed and flushed without

difficulty.  The area was then washed, and dried and sterile bandage was

applied.  The patient tolerated procedure well without any complications.  Chest

x-ray pending.





Job ID: [id number]

DocumentID: [id number]

Dictated Date:  03/19/2020 18:14:50

Transcription Date: 03/20/2020 00:35:10

Dictated By: RICKI DUNLAP DO

[facility]

## 2020-03-20 NOTE — DIAGNOSTIC IMAGING REPORT
PROCEDURE: US carotid duplex, bilateral.



TECHNIQUE: Multiple real-time grayscale images were obtained over

the carotid arteries in various projections, bilaterally.

Additional spectral analysis and color Doppler duplex images were

also obtained.



INDICATION: Altered mental status, CVA.



FINDINGS: Study is limited because of jugular central line on the

right. The velocities and waveforms appear normal. The ratios

appear normal. The right vertebral cannot be seen because of the

dressing. Left vertebral appear normal.



IMPRESSION: There is no abnormality seen in either carotid

bifurcation.





Parameters based on the consensus panel Gray-Scale and Doppler

ultrasound criteria published 

November 2003, Radiology, Volume 229. 



DOPPLER (peak systolic velocity M/S 

    

                Right    Left



CCA              1.29     1.37



ICA Proximal      0.60     0.61



ICA Mid           0.28     0.71

 

ICA Distal        NOT SEEN     NOT SEEN



RATIO            .47     .52



ECA              1.26     1.03



VERT              NOT SEEN     .50



Dictated by: 



  Dictated on workstation # [id number]

## 2020-03-20 NOTE — NUR
VANCOMYCIN DOSING



SCR 1.63; IBW 63 KG; CRCL ~ 37; BOLUS VANC 20 MG/KG X 80 KG ~ 1500 MG THEN VANC 15 MG/KG ~ 
1250 MG Q24H

CHECK TROUGH LEVEL 3/22 1200 BEFORE 3RD DOSE

HOLD DOSE AND CONTACT PHARMACY IF LEVEL IS GREATER THAN 20

## 2020-03-20 NOTE — DIAGNOSTIC IMAGING REPORT
PROCEDURE: MR imaging of the brain without contrast.



TECHNIQUE: Multiplanar, multisequence MR imaging of the brain was

performed without contrast.



INDICATION: Mental status changes. Right lateral gaze palsy.

Positive for influenza.



COMPARISON: CT head performed earlier the same date.



FINDINGS: Small punctate foci of acute ischemia are visualized in

the left corona radiata and right parietal lobe white matter. No

associated mass effect or hemorrhage is seen. The ventricles,

cortical sulci, and basilar cisterns are symmetric and

unremarkable. The sellar and suprasellar regions have a normal

appearance. The major intracranial flow voids are intact.



The brainstem and posterior fossa are unremarkable.



The paranasal sinuses and mastoid air cells demonstrate normal

signal characteristics. The globes and orbits are symmetric and

unremarkable. The scalp and calvarium have a normal appearance.



IMPRESSION:

1. Small punctate foci of acute ischemia involving the left

corona radiata and right parietal lobe. No mass effect or

hemorrhage. Given the bilateral distribution and punctate size,

there is concern for embolic source.



Findings were called to the ICU at 02:50 p.m. on 03/20/2020 by

Dr. Tomas Lima.



Dictated by: 



  Dictated on workstation # [id number]

## 2020-03-20 NOTE — NUR
Telephone order received from  to place an order for an echo and bilateral carotid 
doppler. Order placed. 

This nurse notified house superviser.

## 2020-03-20 NOTE — NUR
Verbal order received from  to give patient 1L NS IV bolus now and to start 
patient on vancomycin pharmacy to dose.

## 2020-03-20 NOTE — DIAGNOSTIC IMAGING REPORT
PROCEDURE: CT head without contrast.



TECHNIQUE: Multiple contiguous axial images were obtained through

the brain without the use of intravenous contrast. Auto Exposure

Controls were utilized during the CT exam to meet ALARA standards

for radiation dose reduction. 



DATE: March 20, 2020.



COMPARISON: None. 



INDICATION: 59-year-old female, aphasia. Altered mental status.



FINDINGS: 



There is an area of CSF-like attenuation in the left basal

ganglia compatible with remote prior lacunar infarct. There is no

hydrocephalus. There is no mass effect or midline shift. There is

no acute intracranial hemorrhage. There is no abnormal

extra-axial fluid collection. The visualized portions of the

paranasal sinuses, mastoid air cells and middle ears are well

aerated. 



IMPRESSION: 

1. No identified acute intracranial abnormality.

2. 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 



Dictated by: 



  Dictated on workstation # [id number]

## 2020-03-20 NOTE — NUR
THIS RN CALLED SIDNEY ABOUT PT HAVING VOMITING. THIS RN INFORMED SIDNEY ADAMS THAT PT 
HAS NO ANTIEMETICS ON EMAR.

## 2020-03-20 NOTE — PROGRESS NOTE
Subjective


Date Seen by a Provider:  Mar 20, 2020


Time Seen by a Provider:  10:00


Subjective/Events-last exam


Fwup Influenza B, severe dehydration with acute renal failure, Staph aureus 

sepsis, metabolic acidosis, scleroderma.  Awake but still nonverbal and not 

following commands.





Focused Exam


Lactate Level


3/18/20 09:40: Lactic Acid Level 2.07*H


3/18/20 13:20: Lactic Acid Level 1.41





Objective


Exam





Vital Signs








  Date Time  Temp Pulse Resp B/P (MAP) Pulse Ox O2 Delivery O2 Flow Rate FiO2


 


3/20/20 11:07     95 Room Air  


 


3/20/20 09:00  109 12 133/73 (93) 97 Room Air  


 


3/20/20 08:12     97 Room Air  


 


3/20/20 08:00  89 9 138/71 (93) 97 Room Air  


 


3/20/20 08:00 36.6       


 


3/20/20 07:00  95 10 115/65 (82) 97 Room Air  


 


3/20/20 06:46  95      


 


3/20/20 06:00  91 14 119/72 (88) 98 Room Air  


 


3/20/20 05:00  93 14 129/95 (106) 98 Room Air  


 


3/20/20 04:00  95 11 123/66 (85) 98 Room Air  


 


3/20/20 03:52      Room Air  


 


3/20/20 03:13 36.7       


 


3/20/20 03:00  95 12 127/63 (84) 97 Room Air  


 


3/20/20 02:25     97 Room Air  


 


3/20/20 02:00  92 11 130/80 (97) 98 Room Air  


 


3/20/20 01:00  92      


 


3/20/20 01:00  93 11 115/71 (86) 97 Room Air  


 


3/20/20 00:07 35.4       


 


3/20/20 00:05      Room Air  


 


3/20/20 00:00  96 12 113/68 (83) 97 Room Air  


 


3/19/20 23:00  97 12 116/65 (82) 97 Room Air  


 


3/19/20 22:00  98 12 119/65 (83) 97 Room Air  


 


3/19/20 21:51     97 Room Air  


 


3/19/20 21:00  96 13 130/74 (92) 97 Room Air  


 


3/19/20 20:00      Room Air  


 


3/19/20 20:00  92 12 133/72 (92) 97 Room Air  


 


3/19/20 19:18 36.4       


 


3/19/20 19:00  92      


 


3/19/20 19:00  92 12 126/72 (90) 98 Room Air  


 


3/19/20 18:52     100 Room Air  


 


3/19/20 18:00  85 10 122/69 (86) 98 Room Air  


 


3/19/20 17:00  92 11 113/73 (86) 98 Room Air  


 


3/19/20 16:00  87 12 124/67 (86) 97 Room Air  


 


3/19/20 15:36 36.7       


 


3/19/20 15:00  91 13 135/71 (92) 98 Room Air  


 


3/19/20 14:27     100 Nasal Cannula 2.00 


 


3/19/20 14:00  100 13 90/52 (65) 95 Room Air  


 


3/19/20 13:00  101 13 105/61 (76) 96 Room Air  


 


3/19/20 12:42  105      














I & O 


 


 3/20/20





 07:00


 


Intake Total 3220 ml


 


Output Total 770 ml


 


Balance 2450 ml





Capillary Refill : Greater Than 3 Seconds


General Appearance:  No Apparent Distress


Neck:  Supple


Respiratory:  Lungs Clear


Cardiovascular:  Regular Rate, Rhythm


Gastrointestinal:  normal bowel sounds, non tender, soft


Extremity:  Non Tender, No Calf Tenderness, No Pedal Edema


Neurologic/Psychiatric:  Alert, Other (nonverbal/eyes fixed to right)


Skin:  Other (sores to both hands/ankles/feet)





Results


Lab


Laboratory Tests


3/20/20 02:52: 


Sodium Level 133L, Potassium Level 4.1, Chloride Level 103, Carbon Dioxide Level

17L, Anion Gap 13, Blood Urea Nitrogen 51H, Creatinine 1.63H, Estimat Glomerular

Filtration Rate 33, BUN/Creatinine Ratio 31, Glucose Level 107H, Calcium Level 

8.4L, Phosphorus Level 3.3, Magnesium Level 2.1





Microbiology


3/18/20 Gram Stain - Final, Resulted


          


3/18/20 Sputum Culture - Preliminary, Resulted


          Usual upper respiratory candice


          Moraxella catarrhalis


          Staphylococcus aureus


3/18/20 Urine Culture - Final, Complete


          NO GROWTH


3/18/20 Blood Culture - Preliminary, Resulted


          No growth





Assessment/Plan


Assessment/Plan


Assess & Plan/Chief Complaint


1.  Influenza B--on tamiflu


2.  Severe Dehydration with Acute Renal Failure--on IVFs, Cr improving


3.  Sepsis with Staph Aureus--on Cefepime, will add Vanc to cover MRSA until 

final cultures back


4.  Scleroderma--hydrocortisone, did discuss code status with  and he 

states that her kids want her to be a full code--I explained to him that it is 

not want her kids want, it is what she wants and that the scleroderma could 

affect the lungs and she may not come off a ventilator if she had to go on one 

but he wished to keep her a full code,  also states that due to her 

scleroderma, when her mouth gets dry that she is unable to speak


5.  Hypotension--improved


6.  Metabolic Acidosis with metabolic encephalopathy--check CT of head to rule 

out stroke, discussed with  that prognosis is guarded but he still wants 

everything done





Clinical Quality Measures


Admission Status


Admission Dx


1.  Influenza B--treat with tamiflu


2.  Severe Dehydration with Acute Renal Failure--aggressive IVF rehydration and 

monitor BUN/Cr


3.  Hypotension--aggressive hydration and pressors if needed


4.  Metabolic Acidosis with Electrolyte Imbalance--Hydrate and replace 

electrolytes as needed


5.  Bacturia--culture urine and cover with maxipime until cultures final


6.  Scleroderma with Vasculitis--patient has not been on any immunosuppressive 

therapy or steroids for at least 3-4mos





DVT/VTE Risk/Contraindication:


Risk Factor Score Per Nursin


RFS Level Per Nursing on Admit:  4+=Very High











YENY DINH DO        Mar 20, 2020 12:36

## 2020-03-20 NOTE — CONSULTATION-CARDIOLOGY
HPI-Cardiology


Cardiology Consultation


Date of Consultation


3/20/20


Date of Admission





Time Seen by Provider:  10:00


Indication:  CVA





HPI


59-year-old lady with history of scleroderma, admitted with sepsis and 

hypotension, tested positive for influenza B.  Has been receiving Tamiflu and 

antibiotic for staph aureus bacteremia.  Was on pressors initially, currently 

her blood pressure is better, it was reported that she was lethargic yesterday, 

able to move slightly but not responding appropriately to command, today he was 

more obtunded, opening her eyes but not following commands or making any 

purposeful movement.  Had MRI of the head and reported multiple parked in the 

left corona radiata and right parietal lobe suggestive with the distribution of 

underlying embolization.  I was called for evaluation for any cardiac source of 

embolization





Home Medications & Allergies


Allergies:  


Coded Allergies:  


     Penicillins (Verified  Allergy, Unknown, 3/18/20)


Home Medication List Reviewed:  Yes





PMH-Social-Family Hx


Patient Social History


Marital Status:  


Alcohol Use:  Denies Use


Recreational Drug Use:  No


Smoking Status:  Never a Smoker


Recent Foreign Travel:  No


Recent Infectious Disease Expo:  No





Immunizations Up To Date


Date of Pneumonia Vaccine:  Mar 18, 2018


Date of Influenza Vaccine:  Oct 1, 2019





Past Medical History


Discussed below





Family Medical History


Significant Family History:  No Pertinent Family Hx





Review of Systems-General


Review of Systems


Constitutional:  see HPI, other (unable to provide review of system due to her 

current condition)


EENTM:  see HPI; No ear pain, No blurred vision


Respiratory:  see HPI, short of breath


Cardiovascular:  see HPI; No chest pain


Gastrointestinal:  no symptoms reported, see HPI; No abdominal pain, No 

dysphagia, No heartburn


Genitourinary:  no symptoms reported, see HPI


Pregnant:  No


Musculoskeletal:  see HPI


Skin:  see HPI, other (scleroderma)


Psychiatric/Neurological:  See HPI; Denies Depressed, Denies Emotional Problems





All Other Systems Reviewed


Negative Unless Noted:  Yes





Reviewed Test Results


Reviewed Test Results


Lab





Laboratory Tests








Test


 3/20/20


02:52 3/20/20


16:18 Range/Units


 


 


Sodium Level 133 L  135-145  MMOL/L


 


Potassium Level 4.1   3.6-5.0  MMOL/L


 


Chloride Level 103     MMOL/L


 


Carbon Dioxide Level 17 L  21-32  MMOL/L


 


Anion Gap 13   5-14  MMOL/L


 


Blood Urea Nitrogen 51 H  7-18  MG/DL


 


Creatinine


 1.63 H


 


 0.60-1.30


MG/DL


 


Estimat Glomerular Filtration


Rate 33 


 


  





 


BUN/Creatinine Ratio 31    


 


Glucose Level 107 H    MG/DL


 


Calcium Level 8.4 L  8.5-10.1  MG/DL


 


Phosphorus Level 3.3   2.3-4.7  MG/DL


 


Magnesium Level 2.1   1.6-2.4  MG/DL


 


Total Bilirubin 1.7 H  0.1-1.0  MG/DL


 


Direct Bilirubin 1.5 H  0.0-0.3  MG/DL


 


Indirect Bilirubin 0.2    MG/DL


 


Aspartate Amino Transf


(AST/SGOT) 189 H


 


 5-34  U/L





 


Alanine Aminotransferase


(ALT/SGPT) 94 H


 


 0-55  U/L





 


Alkaline Phosphatase 273 H    U/L


 


Total Protein 5.9 L  6.4-8.2  GM/DL


 


Albumin 2.3 L  3.2-4.5  GM/DL


 


Ammonia  21  11-32  UMOL/L











Physical Exam


Physical Exam


Vital Signs





Vital Signs - First Documented








 3/18/20





 09:20


 


Temp 37.3


 


Pulse 99


 


Resp 18


 


B/P (MAP) 99/55 (70)


 


Pulse Ox 88


 


O2 Delivery Room Air


 


O2 Flow Rate 2.00





Capillary Refill : Greater Than 3 Seconds


Height, Weight, BMI


Height: '"


Weight: lbs. oz. kg; 25.17 BMI


Method:


General Appearance:  Other (obtunded, opening her eyes, no movement or following

commands, not verbally responsive to any stimuli)


HEENT:  Other (Mucous membranes dry)


Neck:  Supple


Respiratory:  Lungs Clear


Cardiovascular:  Regular Rate, Rhythm, No Edema


Gastrointestinal:  Normal Bowel Sounds, Non Tender, Soft


Rectal:  Deferred


Back:  Normal Inspection


Extremity:  Non Tender, No Calf Tenderness, No Pedal Edema, Other (ulceration on

the left foot)


Neurologic/Psychiatric:  Alert, Other (nonverbal/eyes fixed to right)


Skin:  Other (sores to both hands/ankles/feet)





A/P-Cardiology


Admission Diagnosis


Acute mental status change


Sepsis


Influenza B


Bacteremia





Assessment/Plan


Acute change in mental status, MRI of the head suggestive of embolization, 

echocardiogram was done showing normal left ventricular size and function, 

normal valvular function, no vegetation was noted.  Patient has staph aureus 

bacteremia, added vancomycin to her current treatment, add Lovenox, monitor and 

planning for possible BLANCHE





Influenza B on Tamiflu, managed by Dr. Orender





Septic shock, was on pressor, currently blood pressure is more stable





Severe sepsis, has been on cefepime, added vancomycin today.





Acute renal failure, has been receiving IV fluid with slow improvement of the 

renal function.  Continue to monitor





Scleroderma, has been maintained on hydrocortisone, immunocompromised.





Metabolic encephalopathy.  Workup done by primary care team





Clinical Quality Measures


DVT/VTE Risk/Contraindication:


Risk Factor Score Per Nursin


RFS Level Per Nursing on Admit:  4+=Very High











PILO MARR MD              Mar 20, 2020 17:24

## 2020-03-20 NOTE — PROGRESS NOTE
Subjective


Date Seen by a Provider:  Mar 20, 2020


Time Seen by a Provider:  17:20


Subjective/Events-last exam


Fwup Influenza B, severe dehydration with acute renal failure, Staph aureus 

sepsis, metabolic acidosis, scleroderma.  MRI shows bilataral emboli with 

ischemia.  Awake but still nonverbal and not following commands.





Focused Exam


Lactate Level


3/18/20 09:40: Lactic Acid Level 2.07*H


3/18/20 13:20: Lactic Acid Level 1.41





Objective


Exam





Vital Signs








  Date Time  Temp Pulse Resp B/P (MAP) Pulse Ox O2 Delivery O2 Flow Rate FiO2


 


3/20/20 16:00      Room Air  


 


3/20/20 16:00 36.8       


 


3/20/20 16:00  74 19 144/58 (86) 95 Room Air  


 


3/20/20 14:00  109 9 134/73 (93) 93 Room Air  


 


3/20/20 13:00  107 12 159/78 (105) 95 Room Air  


 


3/20/20 13:00 36.8       


 


3/20/20 12:46  105      


 


3/20/20 12:00 36.6       


 


3/20/20 12:00  109 18 140/80 (100)  Room Air  


 


3/20/20 12:00      Room Air  


 


3/20/20 11:07     95 Room Air  


 


3/20/20 11:00  97 11 98/61 (73) 98 Room Air  


 


3/20/20 10:00  100 11 124/63 (83) 97 Room Air  


 


3/20/20 09:00  109 12 133/73 (93) 97 Room Air  


 


3/20/20 08:12     97 Room Air  


 


3/20/20 08:00      Room Air  


 


3/20/20 08:00  89 9 138/71 (93) 97 Room Air  


 


3/20/20 08:00 36.6       


 


3/20/20 07:00  95 10 115/65 (82) 97 Room Air  


 


3/20/20 06:46  95      


 


3/20/20 06:00  91 14 119/72 (88) 98 Room Air  


 


3/20/20 05:00  93 14 129/95 (106) 98 Room Air  


 


3/20/20 04:00  95 11 123/66 (85) 98 Room Air  


 


3/20/20 03:52      Room Air  


 


3/20/20 03:13 36.7       


 


3/20/20 03:00  95 12 127/63 (84) 97 Room Air  


 


3/20/20 02:25     97 Room Air  


 


3/20/20 02:00  92 11 130/80 (97) 98 Room Air  


 


3/20/20 01:00  92      


 


3/20/20 01:00  93 11 115/71 (86) 97 Room Air  


 


3/20/20 00:07 35.4       


 


3/20/20 00:05      Room Air  


 


3/20/20 00:00  96 12 113/68 (83) 97 Room Air  


 


3/19/20 23:00  97 12 116/65 (82) 97 Room Air  


 


3/19/20 22:00  98 12 119/65 (83) 97 Room Air  


 


3/19/20 21:51     97 Room Air  


 


3/19/20 21:00  96 13 130/74 (92) 97 Room Air  


 


3/19/20 20:00      Room Air  


 


3/19/20 20:00  92 12 133/72 (92) 97 Room Air  


 


3/19/20 19:18 36.4       


 


3/19/20 19:00  92      


 


3/19/20 19:00  92 12 126/72 (90) 98 Room Air  


 


3/19/20 18:52     100 Room Air  


 


3/19/20 18:00  85 10 122/69 (86) 98 Room Air  














I & O 


 


 3/20/20





 07:00


 


Intake Total 3220 ml


 


Output Total 770 ml


 


Balance 2450 ml





Capillary Refill : Greater Than 3 Seconds


General Appearance:  No Apparent Distress


Respiratory:  Lungs Clear


Cardiovascular:  Tachycardia


Gastrointestinal:  normal bowel sounds, non tender, soft


Extremity:  Non Tender, No Calf Tenderness, No Pedal Edema


Neurologic/Psychiatric:  Alert, Oriented x3


Skin:  Other (ulcers/sores to bilateral hands/ankles/feet)





Results


Lab


Laboratory Tests


3/20/20 02:52: 


Sodium Level 133L, Potassium Level 4.1, Chloride Level 103, Carbon Dioxide Level

17L, Anion Gap 13, Blood Urea Nitrogen 51H, Creatinine 1.63H, Estimat Glomerular

Filtration Rate 33, BUN/Creatinine Ratio 31, Glucose Level 107H, Calcium Level 

8.4L, Phosphorus Level 3.3, Magnesium Level 2.1, Total Bilirubin 1.7H, Direct 

Bilirubin 1.5H, Indirect Bilirubin 0.2, Aspartate Amino Transf (AST/SGOT) 189H, 

Alanine Aminotransferase (ALT/SGPT) 94H, Alkaline Phosphatase 273H, Total 

Protein 5.9L, Albumin 2.3L


3/20/20 16:18: Ammonia 21





Microbiology


3/18/20 Gram Stain - Final, Resulted


          


3/18/20 Sputum Culture - Preliminary, Resulted


          Usual upper respiratory candice


          Moraxella catarrhalis


          Staphylococcus aureus


3/18/20 Urine Culture - Final, Complete


          NO GROWTH


3/18/20 Blood Culture - Preliminary, Resulted


          No growth





Assessment/Plan


Assessment/Plan


Assess & Plan/Chief Complaint


1.  Influenza B--on tamiflu


2.  Severe Dehydration with Acute Renal Failure--on IVFs, Cr improving


3.  Sepsis with Staph Aureus--on Cefepime


4.  Scleroderma--hydrocortisone, did discuss code status with  and he 

states that her kids want her to be a full code--I explained to him that it is 

not want her kids want, it is what she wants and that the scleroderma could 

affect the lungs and she may not come off a ventilator if she had to go on one 

but he wished to keep her a full code,  also states that due to her 

scleroderma, when her mouth gets dry that she is unable to speak


5.  Hypotension--improved


6.  Metabolic Acidosis with metabolic encephalopathy--check CT of head to rule 

out stroke, discussed with  that prognosis is guarded but he still wants 

everything done


7.  Bilateral Cerebral Emboli with Ischemia--suspect bacterial endocarditis so 

cardiology consulted and will add Vanc at this time, unable to do bilateral 

carotid dopplers due to IJ and can't do CT angiogram at this time due to BUN/Cr





Long discussion with  in person (keeping COVID-19 guidelines) and with 

both daughters via phone.  Discussed grave prognosis.  Will continue with 

aggressive treatment and monitor response through weekend.   and 

daughters now agree to DNR.





Clinical Quality Measures


Admission Status


Admission Dx


1.  Influenza B--treat with tamiflu


2.  Severe Dehydration with Acute Renal Failure--aggressive IVF rehydration and 

monitor BUN/Cr


3.  Hypotension--aggressive hydration and pressors if needed


4.  Metabolic Acidosis with Electrolyte Imbalance--Hydrate and replace 

electrolytes as needed


5.  Bacturia--culture urine and cover with maxipime until cultures final


6.  Scleroderma with Vasculitis--patient has not been on any immunosuppressive 

therapy or steroids for at least 3-4mos





DVT/VTE Risk/Contraindication:


Risk Factor Score Per Nursin


RFS Level Per Nursing on Admit:  4+=Very High











ORENDER,YENY S DO        Mar 20, 2020 17:29

## 2020-03-21 VITALS — SYSTOLIC BLOOD PRESSURE: 143 MMHG | DIASTOLIC BLOOD PRESSURE: 71 MMHG

## 2020-03-21 VITALS — SYSTOLIC BLOOD PRESSURE: 171 MMHG | DIASTOLIC BLOOD PRESSURE: 79 MMHG

## 2020-03-21 VITALS — SYSTOLIC BLOOD PRESSURE: 186 MMHG | DIASTOLIC BLOOD PRESSURE: 81 MMHG

## 2020-03-21 VITALS — SYSTOLIC BLOOD PRESSURE: 148 MMHG | DIASTOLIC BLOOD PRESSURE: 67 MMHG

## 2020-03-21 VITALS — DIASTOLIC BLOOD PRESSURE: 71 MMHG | SYSTOLIC BLOOD PRESSURE: 129 MMHG

## 2020-03-21 VITALS — DIASTOLIC BLOOD PRESSURE: 72 MMHG | SYSTOLIC BLOOD PRESSURE: 161 MMHG

## 2020-03-21 VITALS — DIASTOLIC BLOOD PRESSURE: 73 MMHG | SYSTOLIC BLOOD PRESSURE: 189 MMHG

## 2020-03-21 VITALS — DIASTOLIC BLOOD PRESSURE: 87 MMHG | SYSTOLIC BLOOD PRESSURE: 185 MMHG

## 2020-03-21 VITALS — SYSTOLIC BLOOD PRESSURE: 163 MMHG | DIASTOLIC BLOOD PRESSURE: 80 MMHG

## 2020-03-21 VITALS — SYSTOLIC BLOOD PRESSURE: 173 MMHG | DIASTOLIC BLOOD PRESSURE: 78 MMHG

## 2020-03-21 VITALS — SYSTOLIC BLOOD PRESSURE: 172 MMHG | DIASTOLIC BLOOD PRESSURE: 95 MMHG

## 2020-03-21 VITALS — SYSTOLIC BLOOD PRESSURE: 166 MMHG | DIASTOLIC BLOOD PRESSURE: 80 MMHG

## 2020-03-21 VITALS — DIASTOLIC BLOOD PRESSURE: 82 MMHG | SYSTOLIC BLOOD PRESSURE: 147 MMHG

## 2020-03-21 VITALS — DIASTOLIC BLOOD PRESSURE: 93 MMHG | SYSTOLIC BLOOD PRESSURE: 168 MMHG

## 2020-03-21 VITALS — DIASTOLIC BLOOD PRESSURE: 57 MMHG | SYSTOLIC BLOOD PRESSURE: 125 MMHG

## 2020-03-21 VITALS — SYSTOLIC BLOOD PRESSURE: 162 MMHG | DIASTOLIC BLOOD PRESSURE: 76 MMHG

## 2020-03-21 VITALS — SYSTOLIC BLOOD PRESSURE: 134 MMHG | DIASTOLIC BLOOD PRESSURE: 61 MMHG

## 2020-03-21 VITALS — DIASTOLIC BLOOD PRESSURE: 82 MMHG | SYSTOLIC BLOOD PRESSURE: 175 MMHG

## 2020-03-21 VITALS — SYSTOLIC BLOOD PRESSURE: 157 MMHG | DIASTOLIC BLOOD PRESSURE: 75 MMHG

## 2020-03-21 VITALS — DIASTOLIC BLOOD PRESSURE: 75 MMHG | SYSTOLIC BLOOD PRESSURE: 150 MMHG

## 2020-03-21 VITALS — DIASTOLIC BLOOD PRESSURE: 86 MMHG | SYSTOLIC BLOOD PRESSURE: 170 MMHG

## 2020-03-21 VITALS — SYSTOLIC BLOOD PRESSURE: 156 MMHG | DIASTOLIC BLOOD PRESSURE: 81 MMHG

## 2020-03-21 VITALS — SYSTOLIC BLOOD PRESSURE: 155 MMHG | DIASTOLIC BLOOD PRESSURE: 76 MMHG

## 2020-03-21 LAB
ALBUMIN SERPL-MCNC: 2.3 GM/DL (ref 3.2–4.5)
ALP SERPL-CCNC: 268 U/L (ref 40–136)
ALT SERPL-CCNC: 139 U/L (ref 0–55)
BASOPHILS # BLD AUTO: 0 10^3/UL (ref 0–0.1)
BASOPHILS NFR BLD AUTO: 0 % (ref 0–10)
BILIRUB SERPL-MCNC: 1.1 MG/DL (ref 0.1–1)
BUN/CREAT SERPL: 42
CALCIUM SERPL-MCNC: 8.1 MG/DL (ref 8.5–10.1)
CHLORIDE SERPL-SCNC: 107 MMOL/L (ref 98–107)
CO2 SERPL-SCNC: 17 MMOL/L (ref 21–32)
CREAT SERPL-MCNC: 1.23 MG/DL (ref 0.6–1.3)
EOSINOPHIL # BLD AUTO: 0 10^3/UL (ref 0–0.3)
EOSINOPHIL NFR BLD AUTO: 0 % (ref 0–10)
ERYTHROCYTE [DISTWIDTH] IN BLOOD BY AUTOMATED COUNT: 18 % (ref 10–14.5)
GFR SERPLBLD BASED ON 1.73 SQ M-ARVRAT: 45 ML/MIN
GLUCOSE SERPL-MCNC: 127 MG/DL (ref 70–105)
HCT VFR BLD CALC: 30 % (ref 35–52)
HGB BLD-MCNC: 9.6 G/DL (ref 11.5–16)
LYMPHOCYTES # BLD AUTO: 1.2 X 10^3 (ref 1–4)
LYMPHOCYTES NFR BLD AUTO: 12 % (ref 12–44)
MAGNESIUM SERPL-MCNC: 2.1 MG/DL (ref 1.6–2.4)
MANUAL DIFFERENTIAL PERFORMED BLD QL: NO
MCH RBC QN AUTO: 28 PG (ref 25–34)
MCHC RBC AUTO-ENTMCNC: 32 G/DL (ref 32–36)
MCV RBC AUTO: 87 FL (ref 80–99)
MONOCYTES # BLD AUTO: 0.4 X 10^3 (ref 0–1)
MONOCYTES NFR BLD AUTO: 4 % (ref 0–12)
NEUTROPHILS # BLD AUTO: 7.8 X 10^3 (ref 1.8–7.8)
NEUTROPHILS NFR BLD AUTO: 83 % (ref 42–75)
PHOSPHATE SERPL-MCNC: 3.7 MG/DL (ref 2.3–4.7)
PLATELET # BLD: 139 10^3/UL (ref 130–400)
PMV BLD AUTO: 10.5 FL (ref 7.4–10.4)
POTASSIUM SERPL-SCNC: 4 MMOL/L (ref 3.6–5)
PROT SERPL-MCNC: 5.9 GM/DL (ref 6.4–8.2)
SODIUM SERPL-SCNC: 137 MMOL/L (ref 135–145)
WBC # BLD AUTO: 9.4 10^3/UL (ref 4.3–11)

## 2020-03-21 RX ADMIN — OSELTAMIVIR PHOSPHATE SCH MG: 30 CAPSULE ORAL at 07:09

## 2020-03-21 RX ADMIN — SODIUM CHLORIDE, SODIUM LACTATE, POTASSIUM CHLORIDE, AND CALCIUM CHLORIDE SCH MLS/HR: 600; 310; 30; 20 INJECTION, SOLUTION INTRAVENOUS at 16:26

## 2020-03-21 RX ADMIN — SODIUM CHLORIDE, SODIUM LACTATE, POTASSIUM CHLORIDE, AND CALCIUM CHLORIDE SCH MLS/HR: 600; 310; 30; 20 INJECTION, SOLUTION INTRAVENOUS at 07:12

## 2020-03-21 RX ADMIN — HYDROCORTISONE SODIUM SUCCINATE SCH MG: 100 INJECTION, POWDER, FOR SOLUTION INTRAMUSCULAR; INTRAVENOUS at 21:52

## 2020-03-21 RX ADMIN — MAGNESIUM SULFATE IN DEXTROSE SCH MLS/HR: 10 INJECTION, SOLUTION INTRAVENOUS at 03:45

## 2020-03-21 RX ADMIN — HYDROCORTISONE SODIUM SUCCINATE SCH MG: 100 INJECTION, POWDER, FOR SOLUTION INTRAMUSCULAR; INTRAVENOUS at 13:59

## 2020-03-21 RX ADMIN — METOPROLOL TARTRATE SCH MG: 1 INJECTION, SOLUTION INTRAVENOUS at 16:26

## 2020-03-21 RX ADMIN — SODIUM CHLORIDE SCH MLS/HR: 900 INJECTION INTRAVENOUS at 11:41

## 2020-03-21 RX ADMIN — SODIUM CHLORIDE SCH MLS/HR: 900 INJECTION INTRAVENOUS at 23:41

## 2020-03-21 RX ADMIN — IPRATROPIUM BROMIDE AND ALBUTEROL SULFATE SCH ML: .5; 3 SOLUTION RESPIRATORY (INHALATION) at 10:15

## 2020-03-21 RX ADMIN — Medication SCH MLS/HR: at 00:26

## 2020-03-21 RX ADMIN — POTASSIUM CHLORIDE SCH MLS/HR: 200 INJECTION, SOLUTION INTRAVENOUS at 03:45

## 2020-03-21 RX ADMIN — VASOPRESSIN SCH MLS/HR: 20 INJECTION INTRAVENOUS at 15:09

## 2020-03-21 RX ADMIN — MORPHINE SULFATE PRN MG: 4 INJECTION, SOLUTION INTRAMUSCULAR; INTRAVENOUS at 21:44

## 2020-03-21 RX ADMIN — SODIUM CHLORIDE, SODIUM LACTATE, POTASSIUM CHLORIDE, AND CALCIUM CHLORIDE SCH MLS/HR: 600; 310; 30; 20 INJECTION, SOLUTION INTRAVENOUS at 23:33

## 2020-03-21 RX ADMIN — PANTOPRAZOLE SODIUM SCH MG: 40 INJECTION, POWDER, FOR SOLUTION INTRAVENOUS at 20:13

## 2020-03-21 RX ADMIN — PANTOPRAZOLE SODIUM SCH MG: 40 INJECTION, POWDER, FOR SOLUTION INTRAVENOUS at 08:02

## 2020-03-21 RX ADMIN — MORPHINE SULFATE PRN MG: 4 INJECTION, SOLUTION INTRAMUSCULAR; INTRAVENOUS at 04:25

## 2020-03-21 RX ADMIN — SODIUM CHLORIDE SCH MLS/HR: 900 INJECTION INTRAVENOUS at 00:35

## 2020-03-21 RX ADMIN — Medication SCH MLS/HR: at 08:02

## 2020-03-21 RX ADMIN — IPRATROPIUM BROMIDE AND ALBUTEROL SULFATE SCH ML: .5; 3 SOLUTION RESPIRATORY (INHALATION) at 18:27

## 2020-03-21 RX ADMIN — IPRATROPIUM BROMIDE AND ALBUTEROL SULFATE SCH ML: .5; 3 SOLUTION RESPIRATORY (INHALATION) at 21:26

## 2020-03-21 RX ADMIN — METOPROLOL TARTRATE SCH MG: 1 INJECTION, SOLUTION INTRAVENOUS at 11:41

## 2020-03-21 RX ADMIN — IPRATROPIUM BROMIDE AND ALBUTEROL SULFATE SCH ML: .5; 3 SOLUTION RESPIRATORY (INHALATION) at 06:37

## 2020-03-21 RX ADMIN — METOPROLOL TARTRATE SCH MG: 1 INJECTION, SOLUTION INTRAVENOUS at 23:41

## 2020-03-21 RX ADMIN — POTASSIUM CHLORIDE SCH MEQ: 1500 TABLET, EXTENDED RELEASE ORAL at 03:46

## 2020-03-21 RX ADMIN — MORPHINE SULFATE PRN MG: 4 INJECTION, SOLUTION INTRAMUSCULAR; INTRAVENOUS at 14:05

## 2020-03-21 RX ADMIN — HYDROCORTISONE SODIUM SUCCINATE SCH MG: 100 INJECTION, POWDER, FOR SOLUTION INTRAMUSCULAR; INTRAVENOUS at 05:42

## 2020-03-21 RX ADMIN — VASOPRESSIN SCH MLS/HR: 20 INJECTION INTRAVENOUS at 07:08

## 2020-03-21 RX ADMIN — OSELTAMIVIR PHOSPHATE SCH MG: 30 CAPSULE ORAL at 20:02

## 2020-03-21 RX ADMIN — ENOXAPARIN SODIUM SCH MG: 100 INJECTION SUBCUTANEOUS at 11:41

## 2020-03-21 RX ADMIN — IPRATROPIUM BROMIDE AND ALBUTEROL SULFATE SCH ML: .5; 3 SOLUTION RESPIRATORY (INHALATION) at 02:07

## 2020-03-21 RX ADMIN — ENOXAPARIN SODIUM SCH MG: 100 INJECTION SUBCUTANEOUS at 23:41

## 2020-03-21 RX ADMIN — SODIUM CHLORIDE, SODIUM LACTATE, POTASSIUM CHLORIDE, AND CALCIUM CHLORIDE SCH MLS/HR: 600; 310; 30; 20 INJECTION, SOLUTION INTRAVENOUS at 00:10

## 2020-03-21 RX ADMIN — Medication SCH MLS/HR: at 19:38

## 2020-03-21 RX ADMIN — IPRATROPIUM BROMIDE AND ALBUTEROL SULFATE SCH ML: .5; 3 SOLUTION RESPIRATORY (INHALATION) at 14:17

## 2020-03-21 RX ADMIN — METOPROLOL TARTRATE SCH MG: 1 INJECTION, SOLUTION INTRAVENOUS at 20:13

## 2020-03-21 NOTE — DIAGNOSTIC IMAGING REPORT
PROCEDURE: CT abdomen and pelvis without contrast.



TECHNIQUE: Multiple contiguous axial images were obtained through

the abdomen and pelvis without the use of intravenous contrast.

Auto Exposure Controls were utilized during the CT exam to meet

ALARA standards for radiation dose reduction. 



INDICATION:  Abdominal pain, vomiting and elevated liver function

studies.



I have no priors.



FINDINGS:  There are multiple stones within the lumen of the

gallbladder, no definite pericholecystic edema, no bile duct

dilatation.  Spleen is mildly enlarged but appeared nonfocal at

this uninfused exam.  There is no hydroureteronephrosis and no

radiodense urinary tract calculi. This patient has a small amount

of pelvic free fluid.  There are substantial degrees of

integumentary and subcutaneous edema about the lower abdomen and

pelvis. No loculated fluid collection.  No pathological air.  Air

within the catheterized urinary bladder lumen is believed to be

owing to that instrumentation.  The uterus and adnexa appeared

unremarkable.  The appendix normal.  There is no evidence for

diverticulitis, some dependent bibasilar subsegmental

atelectasis. No appreciable basilar pleural fluid. Adrenals and

pancreas unremarkable.  The aorta is nonaneurysmal. 



IMPRESSION:  

1.  3rd space fluid is with integumentary edema and small volume

pelvic free fluid.  No loculated collection. 

2.  Extensive cholelithiasis without bile duct dilatation. 

3.  Mild splenomegaly. 

4.  Nonfocal unobstructed urinary tracts with catheterized

urinary bladder.



Dictated by: 



  Dictated on workstation # [id number]

## 2020-03-21 NOTE — PROGRESS NOTE
Subjective


Subjective


Date Seen by Provider:  Mar 21, 2020


Time Seen by Provider:  11:00


58 yo F


critically ill.


She attempts to talk/make noise but unable to understand.





Review of Systems


ROS Unable to Obtain:  unable to communicate





All Other Systems Reviewed


All Other Systems Reviewed:  Yes





Objective


Exam


Vital Signs








Vital Signs








  Date Time  Temp Pulse Resp B/P (MAP) Pulse Ox O2 Delivery O2 Flow Rate FiO2


 


3/21/20 11:28      Room Air  


 


3/21/20 11:00  121 15 186/81 (116) 93 Room Air  


 


3/21/20 10:16     95 Room Air  


 


3/21/20 10:00  109 10 173/78 (109) 94 Room Air  


 


3/21/20 09:00  117 10  92 Room Air  


 


3/21/20 08:00      Room Air  


 


3/21/20 08:00  120 10 162/76 (104) 92 Room Air  


 


3/21/20 07:00  116 7 155/76 (102) 91 Room Air  


 


3/21/20 07:00  122      


 


3/21/20 06:37     92 Room Air  


 


3/21/20 06:00  113 12 161/72 (101) 92 Room Air  


 


3/21/20 05:00  108 12 148/67 (94) 93 Room Air  


 


3/21/20 04:36      Room Air  


 


3/21/20 04:21 36.6       


 


3/21/20 04:00  114 13 150/75 (100) 93 Room Air  


 


3/21/20 03:00  114 12 143/71 (95) 94 Room Air  


 


3/21/20 02:07     93 Room Air  


 


3/21/20 02:00  107 12 125/57 (79) 94 Room Air  


 


3/21/20 01:00  112      


 


3/21/20 01:00  112 12 134/61 (85) 92 Room Air  


 


3/21/20 00:10 37.0       


 


3/21/20 00:00  116 14 129/71 (90) 93 Room Air  


 


3/20/20 23:59      Room Air  


 


3/20/20 23:00  112 12 148/55 (86) 93 Room Air  


 


3/20/20 22:53     93 Room Air  


 


3/20/20 22:00  112 13 150/75 (100) 93 Room Air  


 


3/20/20 21:00  114 12 154/75 (101) 91 Room Air  


 


3/20/20 20:00 37.1       


 


3/20/20 20:00      Room Air  


 


3/20/20 19:00  101      


 


3/20/20 18:51     94 Room Air  


 


3/20/20 18:00  100 12 164/81 (108) 94 Room Air  


 


3/20/20 16:00      Room Air  


 


3/20/20 16:00 36.8       


 


3/20/20 16:00  74 19 144/58 (86) 95 Room Air  


 


3/20/20 14:00  109 9 134/73 (93) 93 Room Air  


 


3/20/20 13:00  107 12 159/78 (105) 95 Room Air  


 


3/20/20 13:00 36.8       


 


3/20/20 12:46  105      














I & O 


 


 3/21/20





 07:00


 


Intake Total 1510 ml


 


Output Total 775 ml


 


Balance 735 ml








General Appearance:  Mild Distress


HEENT:  PERRL/EOMI, Other (Mucous membranes dry)


Neck:  Supple


Respiratory:  Lungs Clear


Cardiovascular:  Tachycardia


Gastrointestinal:  Normal Bowel Sounds, Non Tender, Soft


Rectal:  Deferred


Back:  Normal Inspection


Extremity:  Non Tender, No Calf Tenderness, No Pedal Edema


Neurologic/Psychiatric:  Alert


Skin:  Other (ulcers/sores to bilateral hands/ankles/feet          tight facial 

skin -scleroderma-)





Results


Lab


Laboratory Tests


3/20/20 16:18: Ammonia 21


3/21/20 03:05: 


White Blood Count 9.4, Red Blood Count 3.43L, Hemoglobin 9.6L, Hematocrit 30L, 

Mean Corpuscular Volume 87, Mean Corpuscular Hemoglobin 28, Mean Corpuscular 

Hemoglobin Concent 32, Red Cell Distribution Width 18.0H, Platelet Count 139, 

Mean Platelet Volume 10.5H, Neutrophils (%) (Auto) 83H, Lymphocytes (%) (Auto) 

12, Monocytes (%) (Auto) 4, Eosinophils (%) (Auto) 0, Basophils (%) (Auto) 0, 

Neutrophils # (Auto) 7.8, Lymphocytes # (Auto) 1.2, Monocytes # (Auto) 0.4, Eos

inophils # (Auto) 0.0, Basophils # (Auto) 0.0, Sodium Level 137, Potassium Level

4.0, Chloride Level 107, Carbon Dioxide Level 17L, Anion Gap 13, Blood Urea 

Nitrogen 52H, Creatinine 1.23, Estimat Glomerular Filtration Rate 45, 

BUN/Creatinine Ratio 42, Glucose Level 127H, Calcium Level 8.1L, Corrected 

Calcium 9.5, Phosphorus Level 3.7, Magnesium Level 2.1, Total Bilirubin 1.1H, 

Aspartate Amino Transf (AST/SGOT) 228H, Alanine Aminotransferase (ALT/SGPT) 139H

, Alkaline Phosphatase 268H, Total Protein 5.9L, Albumin 2.3L





Microbiology


3/18/20 Gram Stain - Final, Resulted


          


3/18/20 Sputum Culture - Preliminary, Resulted


          Usual upper respiratory candice


          Moraxella catarrhalis


          Staphylococcus aureus


3/18/20 Urine Culture - Final, Complete


          NO GROWTH


3/18/20 Blood Culture - Preliminary, Resulted


          No growth





Assessment/Plan


Assessment/Plan


Assessment and Plan


3/21/20  


-changing IVF - adding bicarb to improve her metabolic acidosis


-adding metoprolol 2.5mg iv to help with tachycardia and hypertension


-stepping up lovenox to therapeutic dosage-  as it could be atrial fibrillation-

causing the cerebral emboli. 


-continue cefepime and vancomycin for endocarditis coverage-      staph and 

strep in blood culture could be contaminate-





Prognosis-  poor     aware-  pt is DNR.   Will continue to monitor over 

the weekend.


Problems:  


(1) Sepsis


Qualifiers:  


   


(2) Cerebral arterial embolism


Assessment & Plan:  both sides of brain-  right parietal lobe and left corona 

radiata-  MRI 3/20/20





(3) Elevated liver enzymes


(4) Cholelithiasis


Qualifiers:  


   


(5) Splenomegaly


(6) Acute renal failure


Qualifiers:  


   Qualified Codes:  N17.9 - Acute kidney failure, unspecified


Assessment & Plan:  improving with IVF





(7) Influenza B


Assessment & Plan:  tamiflu-  not getting right now as she is not taking po.





(8) Metabolic acidosis





Clinical Quality Measures


DVT/VTE Risk/Contraindication:


Risk Factor Score Per Nursin


RFS Level Per Nursing on Admit:  4+=Very High











SAWYER CONNELLY MD              Mar 21, 2020 12:14

## 2020-03-21 NOTE — NUR
THIS RN CONTACTED SARAHI ABOUT PT'S INCREASED AGITATION THAT THIS RN BELIEVED TO BE SEVERE 
PAIN. NEW ORDERS RECEIVED, SEE ORDER HX.

## 2020-03-21 NOTE — DIAGNOSTIC IMAGING REPORT
HISTORY: Dyspnea



TECHNIQUE: Single frontal view of the chest



COMPARISON: 03/20/2020



FINDINGS:



Lung volumes are mildly low. Interstitial opacities are mildly

prominent. There is bibasilar atelectasis. Aeration appears

stable. The right jugular line projects over the SVC. The cardiac

silhouette is stable in size.



IMPRESSION:

1. Mildly low lung volumes with mildly prominent interstitial

opacities, unchanged since the prior study.



Dictated by: 



  Dictated on workstation # [id number]

## 2020-03-21 NOTE — NUR
THIS RN CALLED [facility] TO INFORM THE PHYSICIAN OF PT'S HEART RATE ABOVE 110BPM. NO NEW ORDERS 
OBTAINED AT THIS TIME.

## 2020-03-22 VITALS — SYSTOLIC BLOOD PRESSURE: 192 MMHG | DIASTOLIC BLOOD PRESSURE: 98 MMHG

## 2020-03-22 VITALS — SYSTOLIC BLOOD PRESSURE: 186 MMHG | DIASTOLIC BLOOD PRESSURE: 99 MMHG

## 2020-03-22 VITALS — SYSTOLIC BLOOD PRESSURE: 181 MMHG | DIASTOLIC BLOOD PRESSURE: 87 MMHG

## 2020-03-22 VITALS — SYSTOLIC BLOOD PRESSURE: 138 MMHG | DIASTOLIC BLOOD PRESSURE: 73 MMHG

## 2020-03-22 VITALS — DIASTOLIC BLOOD PRESSURE: 89 MMHG | SYSTOLIC BLOOD PRESSURE: 183 MMHG

## 2020-03-22 VITALS — SYSTOLIC BLOOD PRESSURE: 179 MMHG | DIASTOLIC BLOOD PRESSURE: 87 MMHG

## 2020-03-22 VITALS — DIASTOLIC BLOOD PRESSURE: 92 MMHG | SYSTOLIC BLOOD PRESSURE: 177 MMHG

## 2020-03-22 VITALS — DIASTOLIC BLOOD PRESSURE: 77 MMHG | SYSTOLIC BLOOD PRESSURE: 153 MMHG

## 2020-03-22 VITALS — SYSTOLIC BLOOD PRESSURE: 181 MMHG | DIASTOLIC BLOOD PRESSURE: 95 MMHG

## 2020-03-22 VITALS — DIASTOLIC BLOOD PRESSURE: 92 MMHG | SYSTOLIC BLOOD PRESSURE: 187 MMHG

## 2020-03-22 VITALS — DIASTOLIC BLOOD PRESSURE: 92 MMHG | SYSTOLIC BLOOD PRESSURE: 191 MMHG

## 2020-03-22 VITALS — DIASTOLIC BLOOD PRESSURE: 95 MMHG | SYSTOLIC BLOOD PRESSURE: 182 MMHG

## 2020-03-22 VITALS — SYSTOLIC BLOOD PRESSURE: 191 MMHG | DIASTOLIC BLOOD PRESSURE: 92 MMHG

## 2020-03-22 VITALS — DIASTOLIC BLOOD PRESSURE: 88 MMHG | SYSTOLIC BLOOD PRESSURE: 186 MMHG

## 2020-03-22 VITALS — SYSTOLIC BLOOD PRESSURE: 136 MMHG | DIASTOLIC BLOOD PRESSURE: 66 MMHG

## 2020-03-22 VITALS — DIASTOLIC BLOOD PRESSURE: 91 MMHG | SYSTOLIC BLOOD PRESSURE: 186 MMHG

## 2020-03-22 VITALS — DIASTOLIC BLOOD PRESSURE: 93 MMHG | SYSTOLIC BLOOD PRESSURE: 195 MMHG

## 2020-03-22 LAB
BASOPHILS # BLD AUTO: 0 10^3/UL (ref 0–0.1)
BASOPHILS NFR BLD AUTO: 0 % (ref 0–10)
BUN/CREAT SERPL: 59
CALCIUM SERPL-MCNC: 7.6 MG/DL (ref 8.5–10.1)
CHLORIDE SERPL-SCNC: 110 MMOL/L (ref 98–107)
CO2 SERPL-SCNC: 19 MMOL/L (ref 21–32)
CREAT SERPL-MCNC: 0.83 MG/DL (ref 0.6–1.3)
EOSINOPHIL # BLD AUTO: 0 10^3/UL (ref 0–0.3)
EOSINOPHIL NFR BLD AUTO: 0 % (ref 0–10)
ERYTHROCYTE [DISTWIDTH] IN BLOOD BY AUTOMATED COUNT: 18.1 % (ref 10–14.5)
GFR SERPLBLD BASED ON 1.73 SQ M-ARVRAT: > 60 ML/MIN
GLUCOSE SERPL-MCNC: 124 MG/DL (ref 70–105)
HCT VFR BLD CALC: 29 % (ref 35–52)
HGB BLD-MCNC: 9.5 G/DL (ref 11.5–16)
LYMPHOCYTES # BLD AUTO: 0.8 X 10^3 (ref 1–4)
LYMPHOCYTES NFR BLD AUTO: 14 % (ref 12–44)
MAGNESIUM SERPL-MCNC: 2.1 MG/DL (ref 1.6–2.4)
MANUAL DIFFERENTIAL PERFORMED BLD QL: NO
MCH RBC QN AUTO: 28 PG (ref 25–34)
MCHC RBC AUTO-ENTMCNC: 33 G/DL (ref 32–36)
MCV RBC AUTO: 86 FL (ref 80–99)
MONOCYTES # BLD AUTO: 0.4 X 10^3 (ref 0–1)
MONOCYTES NFR BLD AUTO: 7 % (ref 0–12)
NEUTROPHILS # BLD AUTO: 4.5 X 10^3 (ref 1.8–7.8)
NEUTROPHILS NFR BLD AUTO: 79 % (ref 42–75)
PHOSPHATE SERPL-MCNC: 3.2 MG/DL (ref 2.3–4.7)
PLATELET # BLD: 129 10^3/UL (ref 130–400)
PMV BLD AUTO: 10.6 FL (ref 7.4–10.4)
POTASSIUM SERPL-SCNC: 4.1 MMOL/L (ref 3.6–5)
SODIUM SERPL-SCNC: 141 MMOL/L (ref 135–145)
WBC # BLD AUTO: 5.7 10^3/UL (ref 4.3–11)

## 2020-03-22 RX ADMIN — OSELTAMIVIR PHOSPHATE SCH MG: 30 CAPSULE ORAL at 09:00

## 2020-03-22 RX ADMIN — SODIUM CHLORIDE, SODIUM LACTATE, POTASSIUM CHLORIDE, AND CALCIUM CHLORIDE SCH MLS/HR: 600; 310; 30; 20 INJECTION, SOLUTION INTRAVENOUS at 04:52

## 2020-03-22 RX ADMIN — HYDROCORTISONE SODIUM SUCCINATE SCH MG: 100 INJECTION, POWDER, FOR SOLUTION INTRAMUSCULAR; INTRAVENOUS at 13:53

## 2020-03-22 RX ADMIN — VASOPRESSIN SCH MLS/HR: 20 INJECTION INTRAVENOUS at 00:24

## 2020-03-22 RX ADMIN — MORPHINE SULFATE PRN MG: 4 INJECTION, SOLUTION INTRAMUSCULAR; INTRAVENOUS at 11:18

## 2020-03-22 RX ADMIN — SODIUM CHLORIDE, SODIUM LACTATE, POTASSIUM CHLORIDE, AND CALCIUM CHLORIDE SCH MLS/HR: 600; 310; 30; 20 INJECTION, SOLUTION INTRAVENOUS at 11:24

## 2020-03-22 RX ADMIN — IPRATROPIUM BROMIDE AND ALBUTEROL SULFATE SCH ML: .5; 3 SOLUTION RESPIRATORY (INHALATION) at 01:00

## 2020-03-22 RX ADMIN — MORPHINE SULFATE PRN MG: 4 INJECTION, SOLUTION INTRAMUSCULAR; INTRAVENOUS at 22:00

## 2020-03-22 RX ADMIN — ONDANSETRON PRN MG: 2 INJECTION, SOLUTION INTRAMUSCULAR; INTRAVENOUS at 16:12

## 2020-03-22 RX ADMIN — POTASSIUM CHLORIDE SCH MLS/HR: 200 INJECTION, SOLUTION INTRAVENOUS at 05:33

## 2020-03-22 RX ADMIN — MORPHINE SULFATE PRN MG: 4 INJECTION, SOLUTION INTRAMUSCULAR; INTRAVENOUS at 15:29

## 2020-03-22 RX ADMIN — SCOPALAMINE SCH MG: 1 PATCH, EXTENDED RELEASE TRANSDERMAL at 17:00

## 2020-03-22 RX ADMIN — METOPROLOL TARTRATE SCH MG: 1 INJECTION, SOLUTION INTRAVENOUS at 15:29

## 2020-03-22 RX ADMIN — LORAZEPAM PRN MG: 2 INJECTION INTRAMUSCULAR; INTRAVENOUS at 17:23

## 2020-03-22 RX ADMIN — Medication SCH MLS/HR: at 12:29

## 2020-03-22 RX ADMIN — Medication SCH MLS/HR: at 05:33

## 2020-03-22 RX ADMIN — HYDROCORTISONE SODIUM SUCCINATE SCH MG: 100 INJECTION, POWDER, FOR SOLUTION INTRAMUSCULAR; INTRAVENOUS at 06:00

## 2020-03-22 RX ADMIN — MORPHINE SULFATE PRN MG: 4 INJECTION, SOLUTION INTRAMUSCULAR; INTRAVENOUS at 02:32

## 2020-03-22 RX ADMIN — SODIUM CHLORIDE SCH MLS/HR: 900 INJECTION INTRAVENOUS at 11:24

## 2020-03-22 RX ADMIN — METOPROLOL TARTRATE SCH MG: 1 INJECTION, SOLUTION INTRAVENOUS at 12:49

## 2020-03-22 RX ADMIN — METOPROLOL TARTRATE SCH MG: 1 INJECTION, SOLUTION INTRAVENOUS at 11:19

## 2020-03-22 RX ADMIN — METOPROLOL TARTRATE SCH MG: 1 INJECTION, SOLUTION INTRAVENOUS at 05:06

## 2020-03-22 RX ADMIN — VASOPRESSIN SCH MLS/HR: 20 INJECTION INTRAVENOUS at 16:12

## 2020-03-22 RX ADMIN — METOPROLOL TARTRATE SCH MG: 1 INJECTION, SOLUTION INTRAVENOUS at 09:30

## 2020-03-22 RX ADMIN — MAGNESIUM SULFATE IN DEXTROSE SCH MLS/HR: 10 INJECTION, SOLUTION INTRAVENOUS at 05:33

## 2020-03-22 RX ADMIN — POTASSIUM CHLORIDE SCH MEQ: 1500 TABLET, EXTENDED RELEASE ORAL at 05:33

## 2020-03-22 RX ADMIN — PANTOPRAZOLE SODIUM SCH MG: 40 INJECTION, POWDER, FOR SOLUTION INTRAVENOUS at 09:30

## 2020-03-22 RX ADMIN — METOPROLOL TARTRATE SCH MG: 1 INJECTION, SOLUTION INTRAVENOUS at 04:20

## 2020-03-22 RX ADMIN — ENOXAPARIN SODIUM SCH MG: 100 INJECTION SUBCUTANEOUS at 11:24

## 2020-03-22 RX ADMIN — VASOPRESSIN SCH MLS/HR: 20 INJECTION INTRAVENOUS at 08:54

## 2020-03-22 NOTE — PROGRESS NOTE - CARDIOLOGY
Cardiology SOAP Progress Note


Subjective:


Not verbally responsive





Objective:


I&O/Vital Signs











 3/21/20 3/21/20 3/21/20 3/21/20





 21:26 22:00 23:00 23:46


 


Pulse  110 103 


 


Resp  12 19 


 


B/P (MAP)  171/79 (109) 185/87 (119) 


 


Pulse Ox 94 93 93 


 


O2 Delivery Room Air Room Air Room Air Room Air





 3/22/20 3/22/20 3/22/20 3/22/20





 00:00 00:28 01:00 01:00


 


Temp  37.4  


 


Pulse 81  96 94


 


Resp 12   15


 


B/P (MAP) 177/92 (120)   138/73 (94)


 


Pulse Ox 93   90


 


O2 Delivery Room Air   Room Air


 


    





 3/22/20 3/22/20 3/22/20 3/22/20





 01:01 02:00 03:00 04:00


 


Pulse  108 89 78


 


Resp  12 16 16


 


B/P (MAP)  179/87 (117) 153/77 (102) 136/66 (89)


 


Pulse Ox 92 92 96 98


 


O2 Delivery Room Air Room Air Room Air Room Air





 3/22/20 3/22/20 3/22/20 3/22/20





 04:33 05:00 06:00 06:02


 


Temp    36.8


 


Pulse  72 71 


 


Resp  16 15 


 


B/P (MAP)  195/93 (127) 182/95 (124) 


 


Pulse Ox  99 99 


 


O2 Delivery Room Air Room Air Room Air 


 


    





 3/22/20 3/22/20 3/22/20 





 07:00 07:00 08:00 


 


Pulse 71 88 71 


 


Resp 7  9 


 


B/P (MAP) 186/88 (120)  181/87 (118) 


 


Pulse Ox 100  99 


 


O2 Delivery Room Air  Room Air 














 3/22/20





 00:00


 


Intake Total 10 ml


 


Output Total 675 ml


 


Balance -665 ml








Constitutional:  other (cannot determine orientation, pt not verbally 

responsive, well developed, well nourished)


Respiratory:  No accessory muscle use; other (good bilat air entry)


Cardiovascular:  regular rate-rhythm, S1 and S2, systolic murmur (soft LANA at 

card base)


Gastrointestional:  No tender; soft; No guarding, No rebound; audible bowel 

sounds


Extremities:  No clubbing, No cyanosis, No significant edema


Neurologic/Psychiatric:  other (aphasic and essentially unresponsive, cannot 

cooperate with a neuro exam)


Skin:  warm/dry, other (scleroderma skin changes noted to face and extremities)





Results/Procedures:


Labs


Laboratory Tests


3/22/20 02:34: 


White Blood Count 5.7, Red Blood Count 3.36L, Hemoglobin 9.5L, Hematocrit 29L, 

Mean Corpuscular Volume 86, Mean Corpuscular Hemoglobin 28, Mean Corpuscular 

Hemoglobin Concent 33, Red Cell Distribution Width 18.1H, Platelet Count 129L, 

Mean Platelet Volume 10.6H, Neutrophils (%) (Auto) 79H, Lymphocytes (%) (Auto) 

14, Monocytes (%) (Auto) 7, Eosinophils (%) (Auto) 0, Basophils (%) (Auto) 0, 

Neutrophils # (Auto) 4.5, Lymphocytes # (Auto) 0.8L, Monocytes # (Auto) 0.4, 

Eosinophils # (Auto) 0.0, Basophils # (Auto) 0.0, Sodium Level 141, Potassium 

Level 4.1, Chloride Level 110H, Carbon Dioxide Level 19L, Anion Gap 12, Blood 

Urea Nitrogen 49H, Creatinine 0.83, Estimat Glomerular Filtration Rate > 60, 

BUN/Creatinine Ratio 59, Glucose Level 124H, Calcium Level 7.6L, Phosphorus 

Level 3.2, Magnesium Level 2.1





Microbiology


3/18/20 Gram Stain - Final, Resulted


          


3/18/20 Sputum Culture - Preliminary, Resulted


          Usual upper respiratory candice


          Moraxella catarrhalis


          Staphylococcus aureus


3/18/20 Urine Culture - Final, Complete


          NO GROWTH


3/18/20 Blood Culture - Preliminary, Resulted


          No growth





Laboratory Tests


3/21/20 03:05








3/22/20 02:34











A/P:


Assessment:





Influenza B





Severe sepsis, presentation with septic shock





Questionable staphylococcal septicemia (one out of two blood cultures positive 

for staph aureus and strep pyogenes)





Mental status changes: sepsis vs stroke





Recent vs ac CVA. MRI on 3/20/20: Small punctate foci of acute ischemia 

involving the left corona radiata and right parietal lobe. No mass effect or 

hemorrhage. Given the bilateral distribution and punctate size, there is concern

for embolic source.





Echo on 3/20/30 (Dr Gillespie): LVEF 80-85%, grade 1 mcgraw dysfunction, PASP 55-60 

mmHg. No evidence of endocarditis or intracardiac thrombus on TTE of 3/20/20





Liver enzyme elevation of undetermined etiology, worsening, managed by the Med 

Svce





Extensive cholelithiasis without bile duct dilatation on CT abdomen on 3/21/20, 

managed by the Med Svce





Anemia of undetermined etiology (may be dilutional due to iv fluid admin)





Acute renal failure, likely ATN due to septic shock, improving





Metabolic acidosis, improving





Scleroderma, treated chronically with steroids





No significant disease of either carotid bifurcation per carotid u/s of 3/20/20





Hypertension


Plan:





* Increase bb because of more hypertension


* Continue iv fluids and bicarb to address acidosis


* Continue to treat with stroke-prophylaxis dose of enoxaparin


* Management of stroke, sepsis, anemia and renal & hepatic failure/injury is 

  with the Med Svce


* Monitor labs closely 


* Management is complex and prognosis is guarded, given multiple, concomitant 

  medical problems











JEANNIE REYNA MD FACP FAC CCDS   Mar 22, 2020 09:19

## 2020-03-22 NOTE — DIAGNOSTIC IMAGING REPORT
INDICATION: Dyspnea



AP view of chest is obtained with comparison made to study of one

day earlier.



Overall heart size and pulmonary vascularity are within normal

limits. There is mild basilar atelectasis. No pneumothorax is

detected.



IMPRESSION: Mild basilar atelectasis similar to previous study.

No new abnormality is identified.



Dictated by: 



  Dictated on workstation # [id number]

## 2020-03-22 NOTE — NUR
TRANSFERRED FROM ICU TO ROOM 427 PER BED.PT. ON COMFORT CARE.  SLEEPING. RESP. SHALLOW. SKIN 
W/D. REMAINS IN DROPLET  ISOLATION. NO APPARENT PAIN AT THIS TIME.

## 2020-03-22 NOTE — PROGRESS NOTE
Subjective


Subjective


Date Seen by Provider:  Mar 22, 2020


Time Seen by Provider:  09:30


60 yo F


critically ill.


She attempts to talk/make noise but unable to understand.


she does move her eyes but does not follow commands.





She is in signficant pain especially with any touch or attempt to move/adjust 

her.





Review of Systems


ROS Unable to Obtain:  unable to communicate





All Other Systems Reviewed


All Other Systems Reviewed:  Yes





Objective


Exam


Vital Signs





Vital Signs








  Date Time  Temp Pulse Resp B/P (MAP) Pulse Ox O2 Delivery O2 Flow Rate FiO2


 


3/22/20 09:00  73 10 181/95 (123) 100 Room Air  


 


3/22/20 08:00      Room Air  


 


3/22/20 08:00  71 9 181/87 (118) 99 Room Air  


 


3/22/20 07:00  88      


 


3/22/20 07:00  71 7 186/88 (120) 100 Room Air  


 


3/22/20 06:02 36.8       


 


3/22/20 06:00  71 15 182/95 (124) 99 Room Air  


 


3/22/20 05:00  72 16 195/93 (127) 99 Room Air  


 


3/22/20 04:33      Room Air  


 


3/22/20 04:00  78 16 136/66 (89) 98 Room Air  


 


3/22/20 03:00  89 16 153/77 (102) 96 Room Air  


 


3/22/20 02:00  108 12 179/87 (117) 92 Room Air  


 


3/22/20 01:01     92 Room Air  


 


3/22/20 01:00  94 15 138/73 (94) 90 Room Air  


 


3/22/20 01:00  96      


 


3/22/20 00:28 37.4       


 


3/22/20 00:00  81 12 177/92 (120) 93 Room Air  


 


3/21/20 23:46      Room Air  


 


3/21/20 23:00  103 19 185/87 (119) 93 Room Air  


 


3/21/20 22:00  110 12 171/79 (109) 93 Room Air  


 


3/21/20 21:26     94 Room Air  


 


3/21/20 21:00  84 12 168/93 (118) 94 Room Air  


 


3/21/20 20:00 37.2       


 


3/21/20 20:00  106 12 172/95 (120) 94 Room Air  


 


3/21/20 20:00      Room Air  


 


3/21/20 19:00  107 12 175/82 (113) 94 Room Air  


 


3/21/20 19:00  106      


 


3/21/20 18:28     93 Room Air  


 


3/21/20 18:00  105 11 134/70 (91) 94 Room Air  


 


3/21/20 17:00  111 8 166/80 (108) 93 Room Air  


 


3/21/20 16:00  115 8 163/80 (107) 92 Room Air  


 


3/21/20 16:00 36.4       


 


3/21/20 15:06      Room Air  


 


3/21/20 15:00  121 8 157/75 (102) 92 Room Air  


 


3/21/20 14:18     93 Room Air  


 


3/21/20 14:00  110 9 189/73 (111) 94 Room Air  


 


3/21/20 13:00  113 8 170/86 (114) 94 Room Air  


 


3/21/20 12:36  115      


 


3/21/20 12:00  115 8 156/81 (106) 92 Room Air  


 


3/21/20 12:00 36.2       


 


3/21/20 11:28      Room Air  


 


3/21/20 11:00  121 15 186/81 (116) 93 Room Air  














I & O 


 


 3/22/20





 07:00


 


Intake Total 3060 ml


 


Output Total 1080 ml


 


Balance 1980 ml








General Appearance:  Moderate Distress (pain)


HEENT:  PERRL/EOMI, Other (Mucous membranes dry)


Neck:  Supple


Respiratory:  Lungs Clear


Cardiovascular:  Regular Rate, Rhythm


Gastrointestinal:  Normal Bowel Sounds, Non Tender, Soft


Rectal:  Deferred


Back:  Normal Inspection


Extremity:  No Calf Tenderness, No Pedal Edema, Other (contractures of 

arms/legs.   tight shiny skin)


Neurologic/Psychiatric:  Alert


Skin:  Other (ulcers/sores to bilateral hands/ankles/feet          tight facial 

skin -scleroderma-)





Results


Lab


Laboratory Tests


3/22/20 02:34: 


White Blood Count 5.7, Red Blood Count 3.36L, Hemoglobin 9.5L, Hematocrit 29L, 

Mean Corpuscular Volume 86, Mean Corpuscular Hemoglobin 28, Mean Corpuscular 

Hemoglobin Concent 33, Red Cell Distribution Width 18.1H, Platelet Count 129L, 

Mean Platelet Volume 10.6H, Neutrophils (%) (Auto) 79H, Lymphocytes (%) (Auto) 

14, Monocytes (%) (Auto) 7, Eosinophils (%) (Auto) 0, Basophils (%) (Auto) 0, 

Neutrophils # (Auto) 4.5, Lymphocytes # (Auto) 0.8L, Monocytes # (Auto) 0.4, 

Eosinophils # (Auto) 0.0, Basophils # (Auto) 0.0, Sodium Level 141, Potassium 

Level 4.1, Chloride Level 110H, Carbon Dioxide Level 19L, Anion Gap 12, Blood 

Urea Nitrogen 49H, Creatinine 0.83, Estimat Glomerular Filtration Rate > 60, 

BUN/Creatinine Ratio 59, Glucose Level 124H, Calcium Level 7.6L, Phosphorus 

Level 3.2, Magnesium Level 2.1





Microbiology


3/18/20 Gram Stain - Final, Complete


          


3/18/20 Sputum Culture - Final, Complete


          Usual upper respiratory candice


          Moraxella catarrhalis


          Staphylococcus aureus


3/18/20 Urine Culture - Final, Complete


          NO GROWTH


3/18/20 Blood Culture - Preliminary, Resulted


          No growth





Assessment/Plan


Assessment/Plan


Assessment and Plan


3/21/20  


-changing IVF - adding bicarb to improve her metabolic acidosis


-adding metoprolol 2.5mg iv to help with tachycardia and hypertension


-stepping up lovenox to therapeutic dosage-  as it could be atrial fibrillation-

causing the cerebral emboli. 


-continue cefepime and vancomycin for endocarditis coverage-      staph and 

strep in blood culture could be contaminate-








3/22/20-  increased her morphine to 4mg-  as she is in severe pain/discomfort.  


updated  on his wife's status-  he agrees he wants her comfortable and 

not suffer in pain.


Prognosis-  poor    aware-  pt is DNR.   Likely go to comfort care 

tomorrow.


Problems:  


(1) Sepsis


Qualifiers:  


   


(2) Cerebral arterial embolism


Assessment & Plan:  both sides of brain-  right parietal lobe and left corona 

radiata-  MRI 3/20/20





(3) Elevated liver enzymes


(4) Cholelithiasis


Qualifiers:  


   


(5) Splenomegaly


(6) Acute renal failure


Qualifiers:  


   Qualified Codes:  N17.9 - Acute kidney failure, unspecified


Assessment & Plan:  improving with IVF





(7) Influenza B


Assessment & Plan:  tamiflu-  not getting right now as she is not taking po.





(8) Metabolic acidosis





Clinical Quality Measures


DVT/VTE Risk/Contraindication:


Risk Factor Score Per Nursin


RFS Level Per Nursing on Admit:  4+=Very High











SAWYER CONNELLY MD              Mar 22, 2020 10:45

## 2020-03-23 VITALS — SYSTOLIC BLOOD PRESSURE: 125 MMHG | DIASTOLIC BLOOD PRESSURE: 83 MMHG

## 2020-03-23 VITALS — DIASTOLIC BLOOD PRESSURE: 95 MMHG | SYSTOLIC BLOOD PRESSURE: 117 MMHG

## 2020-03-23 VITALS — SYSTOLIC BLOOD PRESSURE: 184 MMHG | DIASTOLIC BLOOD PRESSURE: 104 MMHG

## 2020-03-23 RX ADMIN — POTASSIUM CHLORIDE, DEXTROSE MONOHYDRATE AND SODIUM CHLORIDE SCH MLS/HR: 75; 5; 450 INJECTION, SOLUTION INTRAVENOUS at 16:36

## 2020-03-23 RX ADMIN — MORPHINE SULFATE PRN MG: 4 INJECTION, SOLUTION INTRAMUSCULAR; INTRAVENOUS at 16:36

## 2020-03-23 RX ADMIN — SODIUM CHLORIDE SCH MLS/HR: 900 INJECTION INTRAVENOUS at 20:51

## 2020-03-23 RX ADMIN — MORPHINE SULFATE PRN MG: 4 INJECTION, SOLUTION INTRAMUSCULAR; INTRAVENOUS at 05:55

## 2020-03-23 RX ADMIN — MORPHINE SULFATE PRN MG: 4 INJECTION, SOLUTION INTRAMUSCULAR; INTRAVENOUS at 09:08

## 2020-03-23 RX ADMIN — LORAZEPAM PRN MG: 2 INJECTION INTRAMUSCULAR; INTRAVENOUS at 02:44

## 2020-03-23 RX ADMIN — MORPHINE SULFATE PRN MG: 4 INJECTION, SOLUTION INTRAMUSCULAR; INTRAVENOUS at 23:43

## 2020-03-23 RX ADMIN — LORAZEPAM PRN MG: 2 INJECTION INTRAMUSCULAR; INTRAVENOUS at 22:28

## 2020-03-23 NOTE — NUR
CM/SS:  Visited with spouse as to plan for discharge 



Plan:  Undetermined at at this time.  Pt is comfort care, and may pass here or my go to [facility] for placement.



Summary: Visited with spouse as to plan for discharge and he reports that pt will not be 
leaving here and no plan for discharging.  This worker discusses comfort care and he reports 
that pt just woke up on today, and had been non responsive prior to today.  He reports his 
daughters came over the weekend from [facility].  He reports he is here as 
he is aware that when he leaves that he will not be able to return based on the change in 
the no visitor rule. 



Dr Garcia makes rounds and talks with spouse about hospice and placement options.  He is 
open to pt going to [facility] as they live in [place].  He also has indicated 
he would like to use [facility] for end of life care.   He also knows that if 
things change for pt she may remain here and pass.  



Referral made to [facility] and [facility].  



[facility] ([facility]) [phone], talks with spouse on the phone as to their services.  
She calls back to let this worker know that she spoke with spouse.  She also shared that he 
did not want any printed information at this time for Hospice.  



[facility] calls back about the referral.  They are reviewing it and the insurance 
is [employer] [facility].  They are not in the network with them  She will run it and get back 
with this worker on tomorrow.  This worker will follow up.

## 2020-03-23 NOTE — ST DYSPHAGIA EVALUATION
Speech Evaluation-General


Medical Diagnosis


Influenza B, Vlad


Onset Date:  Mar 23, 2020





Therapy Diagnosis


Therapy Diagnosis:  Oropharyngeal Dysphagia





Precautions


Precautions:  Aspiration





Referral


Referring Physician:  Dr. Garcia





Medical History


Reviewed History:  Yes





Social History


Current Living Status:  Spouse





Speech PLF/Current-Dysphagia


Prior Level of Function


Patient lived at home with her  where he assisted her with her daily 

needs.





Subjective


Patient was cooperative with the Bedside Dysphagia Evaluation.





Cognitive Status


Patient Orientation:  Person, Place, Situation





Oral Motor Skills


Dentition:  Natural


Patient was NPO pending BDE.


Oral Expression Ability:  Moderate Impairment





Voice


Voice Phonatory-Based Quality:  Breathy, Hoarse, Weak


Voice Pitch:  Normal


Voice Loudness:  Moderately Soft/Quiet





Face


Facial Symmetry:  Symmetrical





Oral-Facial Assessment


Oral-Facial Dentition:  Overbite


Labial Seal Description:  Reduced ROM, Weak, Poor Coordination


Smile:  Reduced ROM, Poor Coordination


Puff Cheeks:  Reduced Strength


Lingual Protrusion:  Abnormal


Lingual ROM:  Abnormal


Lingual Strength:  Abnormal


Pharynx Velopharyngeal Move.:  Normal


Volitional Dry Swallow:  Yes


Voluntary Cough:  Yes


Can Clear Throat Volitionally:  Yes





Dysphagia Evaluation


Consistencies Presented:  Thin Liquid, Mechanical Soft, Pureed


Oral phase grossly within normal range of function.


Pharyngeal phase within normal range of function.


Dietary Recommendations:  Mechanical Soft


Liquid Recommendations:  Thin


Swallowing Precautions:  Alternate Liquids/Solids, Double Swallow, Decreased 

Bolus 1/2 Tsp, Decreased Rate of Oral Intake, Liquids from Cup, Liquids from 

Spoon, Small Bites and Sips, Sitting Upright 90 Degrees, Sitting 90 Degrees 30 

Post Intake


Dysphagia Evaluation Summary


Patient was pleasant and cooperative with the BDE. Patient was given 1/2 tsp of 

water x2 without difficulty. Patient was also given puree and mechanical soft  

at 1/2 bite size of each without difficulty. Patient is recommended for 

Dysphagia II diet level and thin liquids. This information was provided for her 

nurse and written on the white board in her room.


Barriers to Learning


Patient's medical status





Speech-Plan


Patient/Family Goals


Patient/Family Goals:  


Patient plans on returning home with her  upon hospital discharge.





Treatment Plan


Speech Therapy Treatment Plan:  Discontinue ST


Patient is on comfort care.


Treatment Duration:  Mar 23, 2020


Frequency:  1 time per week


Estimated Hrs Per Day:  .25 hour per day


Rehab Potential:  Poor


Barriers to Learning:  


Patient's medical status


Pt/Family Agrees to Plan:  Yes





Safety Risks/Education


Teaching Recipient:  Patient, Significant Other


Teaching Methods:  Demonstration, Discussion


Response to Teaching:  Verbalize Understanding, Return Demonstration


Education Topics Provided:  


Diet level and safety of oral intake strategies





Time


Speech Therapy Time In:  16:00


Speech Therapy Time Out:  16:15


Total Billed Time:  15


Billed Treatment Time


1 RACHID Michael            Mar 23, 2020 16:20

## 2020-03-23 NOTE — PROGRESS NOTE - CARDIOLOGY
Cardiology SOAP Progress Note


Subjective:


Not able to communicate 





Her  by her bedside at time of this exam





Objective:


I&O/Vital Signs











 3/23/20 3/23/20





 09:00 10:21


 


O2 Delivery Room Air Room Air














 3/23/20





 00:00


 


Intake Total 0 ml


 


Output Total 600 ml


 


Balance -600 ml








Constitutional:  other (cannot determine orientation, pt not verbally 

responsive, well developed, well nourished)


Respiratory:  No accessory muscle use; other (good bilat air entry)


Cardiovascular:  regular rate-rhythm, S1 and S2, systolic murmur (soft LANA at 

card base)


Gastrointestional:  No tender; soft; No guarding, No rebound; audible bowel 

sounds


Extremities:  No clubbing, No cyanosis, No significant edema


Neurologic/Psychiatric:  other (aphasic and essentially unresponsive, cannot 

cooperate with a neuro exam)


Skin:  warm/dry, other (scleroderma skin changes noted to face and extremities)





Results/Procedures:


Labs





Microbiology


3/18/20 Gram Stain - Final, Complete


          


3/18/20 Sputum Culture - Final, Complete


          Usual upper respiratory candice


          Moraxella catarrhalis


          Staphylococcus aureus


3/18/20 Urine Culture - Final, Complete


          NO GROWTH


3/18/20 Blood Culture - Preliminary, Resulted


          No growth








A/P:


Assessment:





Influenza B





Severe sepsis, presentation with septic shock





Questionable staphylococcal septicemia (one out of two blood cultures positive 

for staph aureus and strep pyogenes)





Mental status changes: sepsis vs stroke





Recent vs ac CVA. MRI on 3/20/20: Small punctate foci of acute ischemia 

involving the left corona radiata and right parietal lobe. No mass effect or 

hemorrhage. Given the bilateral distribution and punctate size, there is concern

for embolic source.





Echo on 3/20/30 (Dr Gillespie): LVEF 80-85%, grade 1 mcgraw dysfunction, PASP 55-60 

mmHg. No evidence of endocarditis or intracardiac thrombus on TTE of 3/20/20





Liver enzyme elevation of undetermined etiology, worsening, managed by the Med 

Svce





Extensive cholelithiasis without bile duct dilatation on CT abdomen on 3/21/20, 

managed by the Med Svce





Anemia of undetermined etiology (may be dilutional due to iv fluid admin)





Acute renal failure, likely ATN due to septic shock, improving





Metabolic acidosis, improving





Scleroderma, treated chronically with steroids





No significant disease of either carotid bifurcation per carotid u/s of 3/20/20





Hypertension


Plan:





* I reviewed Dr Garcia's notes


* I discussed her case with patient's . He does not wish to pursue 

  further cardiac or other w/u. Desires comfort care only


* We will sign off. Please call if needed





Clinical Quality Measures


Type of Care:


Type of Care:  Comfort Measures











JEANNIE REYNA MD FACP [facility]   Mar 23, 2020 13:37

## 2020-03-23 NOTE — PROGRESS NOTE
Subjective


Date Seen by a Provider:  Mar 23, 2020


Time Seen by a Provider:  13:02


Subjective/Events-last exam


Fwup influenza B, sepsis, bilateral emboli with ischemic stroke.  Awake today 

and talking but  states unable to drink or eat and unable to move her 

body.





Objective


Exam





Vital Signs








  Date Time  Temp Pulse Resp B/P (MAP) Pulse Ox O2 Delivery O2 Flow Rate FiO2


 


3/23/20 10:21      Room Air  


 


3/23/20 09:00      Room Air  


 


3/22/20 21:00      Room Air  


 


3/22/20 16:00  66  192/98 (129) 99 Room Air  


 


3/22/20 15:38 36.6       


 


3/22/20 15:09      Room Air  


 


3/22/20 15:00  68  191/92 (125) 100 Room Air  


 


3/22/20 14:00  64 13 191/92 (125) 98 Room Air  














I & O 


 


 3/23/20





 07:00


 


Intake Total 0 ml


 


Output Total 900 ml


 


Balance -900 ml





Capillary Refill : NONE


General Appearance:  Mild Distress


Respiratory:  Lungs Clear


Cardiovascular:  Tachycardia


Extremity:  Non Tender, No Calf Tenderness, No Pedal Edema


Neurologic/Psychiatric:  Alert, Motor Weakness





Results


Lab





Microbiology


3/18/20 Gram Stain - Final, Complete


          


3/18/20 Sputum Culture - Final, Complete


          Usual upper respiratory candice


          Moraxella catarrhalis


          Staphylococcus aureus


3/18/20 Urine Culture - Final, Complete


          NO GROWTH


3/18/20 Blood Culture - Preliminary, Resulted


          No growth





Assessment/Plan


Assessment/Plan


Assess & Plan/Chief Complaint


1.  Influenza B--done with Tx


2.  Severe Dehydration with Acute Renal Failure--on comfort care


3.  Sepsis with Staph Aureus--on comfort care


4.  Scleroderma--looking at NH/Hospice as  knows longterm care is needed 

if she survives


5.  Hypotension--improved


6.  Metabolic Acidosis with metabolic encephalopathy--


7.  Bilateral Cerebral Emboli with Ischemia--on comfort care, not able to 

eat/drink or move at this point so proceed with hospice consult and placement





Clinical Quality Measures


Admission Status


Admission Dx


1.  Influenza B--treat with tamiflu


2.  Severe Dehydration with Acute Renal Failure--aggressive IVF rehydration and 

monitor BUN/Cr


3.  Hypotension--aggressive hydration and pressors if needed


4.  Metabolic Acidosis with Electrolyte Imbalance--Hydrate and replace 

electrolytes as needed


5.  Bacturia--culture urine and cover with maxipime until cultures final


6.  Scleroderma with Vasculitis--patient has not been on any immunosuppressive 

therapy or steroids for at least 3-4mos





DVT/VTE Risk/Contraindication:


Risk Factor Score Per Nursin


RFS Level Per Nursing on Admit:  4+=Very High











YENY DINH DO        Mar 23, 2020 13:05

## 2020-03-24 VITALS — SYSTOLIC BLOOD PRESSURE: 159 MMHG | DIASTOLIC BLOOD PRESSURE: 83 MMHG

## 2020-03-24 VITALS — SYSTOLIC BLOOD PRESSURE: 151 MMHG | DIASTOLIC BLOOD PRESSURE: 63 MMHG

## 2020-03-24 VITALS — SYSTOLIC BLOOD PRESSURE: 148 MMHG | DIASTOLIC BLOOD PRESSURE: 86 MMHG

## 2020-03-24 VITALS — DIASTOLIC BLOOD PRESSURE: 90 MMHG | SYSTOLIC BLOOD PRESSURE: 171 MMHG

## 2020-03-24 LAB
ALBUMIN SERPL-MCNC: 2.3 GM/DL (ref 3.2–4.5)
ALP SERPL-CCNC: 218 U/L (ref 40–136)
ALT SERPL-CCNC: 124 U/L (ref 0–55)
BASOPHILS # BLD AUTO: 0 10^3/UL (ref 0–0.1)
BASOPHILS NFR BLD AUTO: 0 % (ref 0–10)
BILIRUB SERPL-MCNC: 1.4 MG/DL (ref 0.1–1)
BUN/CREAT SERPL: 39
CALCIUM SERPL-MCNC: 7.5 MG/DL (ref 8.5–10.1)
CHLORIDE SERPL-SCNC: 113 MMOL/L (ref 98–107)
CO2 SERPL-SCNC: 23 MMOL/L (ref 21–32)
CREAT SERPL-MCNC: 0.51 MG/DL (ref 0.6–1.3)
EOSINOPHIL # BLD AUTO: 0 10^3/UL (ref 0–0.3)
EOSINOPHIL NFR BLD AUTO: 0 % (ref 0–10)
ERYTHROCYTE [DISTWIDTH] IN BLOOD BY AUTOMATED COUNT: 18.1 % (ref 10–14.5)
GFR SERPLBLD BASED ON 1.73 SQ M-ARVRAT: > 60 ML/MIN
GLUCOSE SERPL-MCNC: 99 MG/DL (ref 70–105)
HCT VFR BLD CALC: 29 % (ref 35–52)
HGB BLD-MCNC: 9.1 G/DL (ref 11.5–16)
LYMPHOCYTES # BLD AUTO: 0.9 X 10^3 (ref 1–4)
LYMPHOCYTES NFR BLD AUTO: 10 % (ref 12–44)
MANUAL DIFFERENTIAL PERFORMED BLD QL: NO
MCH RBC QN AUTO: 28 PG (ref 25–34)
MCHC RBC AUTO-ENTMCNC: 32 G/DL (ref 32–36)
MCV RBC AUTO: 90 FL (ref 80–99)
MONOCYTES # BLD AUTO: 0.6 X 10^3 (ref 0–1)
MONOCYTES NFR BLD AUTO: 6 % (ref 0–12)
NEUTROPHILS # BLD AUTO: 7.4 X 10^3 (ref 1.8–7.8)
NEUTROPHILS NFR BLD AUTO: 84 % (ref 42–75)
PLATELET # BLD: 159 10^3/UL (ref 130–400)
PMV BLD AUTO: 10.6 FL (ref 7.4–10.4)
POTASSIUM SERPL-SCNC: 3.5 MMOL/L (ref 3.6–5)
PROT SERPL-MCNC: 5.7 GM/DL (ref 6.4–8.2)
SODIUM SERPL-SCNC: 145 MMOL/L (ref 135–145)
WBC # BLD AUTO: 8.8 10^3/UL (ref 4.3–11)

## 2020-03-24 RX ADMIN — SODIUM CHLORIDE SCH MLS/HR: 900 INJECTION INTRAVENOUS at 08:32

## 2020-03-24 RX ADMIN — MAGNESIUM SULFATE IN DEXTROSE SCH MLS/HR: 10 INJECTION, SOLUTION INTRAVENOUS at 14:31

## 2020-03-24 RX ADMIN — POTASSIUM CHLORIDE, DEXTROSE MONOHYDRATE AND SODIUM CHLORIDE SCH MLS/HR: 75; 5; 450 INJECTION, SOLUTION INTRAVENOUS at 12:07

## 2020-03-24 RX ADMIN — SODIUM CHLORIDE SCH MLS/HR: 900 INJECTION INTRAVENOUS at 20:47

## 2020-03-24 RX ADMIN — MORPHINE SULFATE PRN MG: 4 INJECTION, SOLUTION INTRAMUSCULAR; INTRAVENOUS at 04:51

## 2020-03-24 RX ADMIN — POTASSIUM CHLORIDE, DEXTROSE MONOHYDRATE AND SODIUM CHLORIDE SCH MLS/HR: 75; 5; 450 INJECTION, SOLUTION INTRAVENOUS at 22:00

## 2020-03-24 RX ADMIN — MORPHINE SULFATE PRN MG: 4 INJECTION, SOLUTION INTRAMUSCULAR; INTRAVENOUS at 23:54

## 2020-03-24 RX ADMIN — POTASSIUM CHLORIDE, DEXTROSE MONOHYDRATE AND SODIUM CHLORIDE SCH MLS/HR: 75; 5; 450 INJECTION, SOLUTION INTRAVENOUS at 23:40

## 2020-03-24 RX ADMIN — POTASSIUM CHLORIDE, DEXTROSE MONOHYDRATE AND SODIUM CHLORIDE SCH MLS/HR: 75; 5; 450 INJECTION, SOLUTION INTRAVENOUS at 02:10

## 2020-03-24 RX ADMIN — MAGNESIUM SULFATE IN DEXTROSE SCH MLS/HR: 10 INJECTION, SOLUTION INTRAVENOUS at 15:14

## 2020-03-24 RX ADMIN — MORPHINE SULFATE PRN MG: 4 INJECTION, SOLUTION INTRAMUSCULAR; INTRAVENOUS at 12:07

## 2020-03-24 NOTE — NUR
CM/SS:  Visit with pt and spouse Sudarshan (via phone) as to plan for discharge 



Plan:  [facility] for skilled placement 



Summary:  Pt reports she wants to go home, as she can not stay another night.  Spouse is 
called to discuss what he would like for the discharge plan.  He reports he can not take the 
pt home and that she will need placement.  The phone is taken in the the pt and she is able 
to talk with spouse.  Spouse does report that he can not take care of pt at home.  Pt does 
hear this and still is begging to go home.  Dr is scheduled to round later today.  



Spouse is also informed that the insurance is not in the network.  Spouse would like to look 
somewhere else that can take his insurance.  



[facility] calls and report that they can possibly take the pt but will need some 
physical therapy notes and screenings for consideration, per the insurance. This worker will 
follow up. 




-------------------------------------------------------------------------------

Addendum: 03/24/20 at 1608 by JE YARBROUGH 

-------------------------------------------------------------------------------

Call to spouse Sudarshan - [phone] - put phone on speaker as pt is refusing to have CT 
done, as well as not taking medications.  He does try and talk with pt.  She continues to 
report that she wants to go home and that she is not going to take meds, as she thinks that 
someone is trying to kill her.  Pt is reassured by spouse and this worker.  Pt begins to get 
agitated and spouse is asked to end call, however he does request that this worker call his 
daughter Beth [phone].  



Call to Beth - daughter [phone].  She tries to talk with pt as to her needing to take 
her medications as well as cooperate with staff.  Pt continues to not want hear what pt is 
saying and that she just needs to go home.  Daughter reports that pt was having 
hallucinations at home before her hospital stay.  Physical therapy here to see pt, they are 
asked to return. Pt reports seeing someone in the closet.  This worker talks with pt as to 
there not being anyone else in the room, this worker opens the bathroom door and turns on 
light so that pt can see.  Pt seems reassured.  Encouraged pt to work with physical therapy. 
 Physical Therapy does return.  



Call to spouse Sudarshan, let him know that pt did participate in physical therapy.  He 
thanked this worker for the update.  



Physical therapy report to be sent to [facility].

## 2020-03-24 NOTE — NUR
STAFF FROM CT HERE TO GET PATIENT AT THIS TIME FOR CT ANGIO, PATIENT REFUSED TO LET CHIVO 
TAKE HER FOR THIS PROCEDURE.  DR. DINH NOTIFIED AT THIS TIME NO NEW ORDERS.

## 2020-03-24 NOTE — PROGRESS NOTE
Subjective


Date Seen by a Provider:  Mar 24, 2020


Time Seen by a Provider:  12:30


Subjective/Events-last exam


Fwup influenza B, sepsis, bilateral emboli with ischemic stroke.  Awake today 

and wanting to go home.  Is able to lift arms slightly but unable to assist with

anything.  Nursing and CNAs report gurgling/choking/coughing when takes in 

liquids.





Objective


Exam





Vital Signs








  Date Time  Temp Pulse Resp B/P (MAP) Pulse Ox O2 Delivery O2 Flow Rate FiO2


 


3/24/20 12:00 37.2 121 18 159/83 (108) 98 Room Air  


 


3/24/20 09:00     99 Room Air 2.00 


 


3/24/20 08:00 36.9    95 Room Air  


 


3/24/20 07:33      Room Air  


 


3/24/20 04:09 37.3 114 20 148/86 (106) 99 Room Air  


 


3/23/20 23:50 37.9 111 20 125/83 (97) 94 Room Air  


 


3/23/20 19:17 37.1 109 20 184/104 (130) 97 Room Air  


 


3/23/20 18:30      Room Air  


 


3/23/20 17:02 37.3 110 15 117/95 (102) 96 Room Air  














I & O 


 


 3/24/20





 07:00


 


Intake Total 1010 ml


 


Output Total 2375 ml


 


Balance -1365 ml





Capillary Refill : Greater Than 3 Seconds


General Appearance:  Mild Distress


Respiratory:  Lungs Clear


Cardiovascular:  Tachycardia


Gastrointestinal:  normal bowel sounds, non tender, soft


Neurologic/Psychiatric:  Alert, Motor Weakness (generalized with contractures of

hands)


Skin:  Warm/Dry





Results


Lab


Laboratory Tests


3/24/20 05:17: 


White Blood Count 8.8, Red Blood Count 3.21L, Hemoglobin 9.1L, Hematocrit 29L, 

Mean Corpuscular Volume 90, Mean Corpuscular Hemoglobin 28, Mean Corpuscular 

Hemoglobin Concent 32, Red Cell Distribution Width 18.1H, Platelet Count 159, 

Mean Platelet Volume 10.6H, Neutrophils (%) (Auto) 84H, Lymphocytes (%) (Auto) 

10L, Monocytes (%) (Auto) 6, Eosinophils (%) (Auto) 0, Basophils (%) (Auto) 0, N

eutrophils # (Auto) 7.4, Lymphocytes # (Auto) 0.9L, Monocytes # (Auto) 0.6, 

Eosinophils # (Auto) 0.0, Basophils # (Auto) 0.0, Sodium Level 145, Potassium 

Level 3.5L, Chloride Level 113H, Carbon Dioxide Level 23, Anion Gap 9, Blood 

Urea Nitrogen 20H, Creatinine 0.51L, Estimat Glomerular Filtration Rate > 60, 

BUN/Creatinine Ratio 39, Glucose Level 99, Calcium Level 7.5L, Corrected Calcium

8.9, Magnesium Level 1.5L, Total Bilirubin 1.4H, Aspartate Amino Transf 

(AST/SGOT) 103H, Alanine Aminotransferase (ALT/SGPT) 124H, Alkaline Phosphatase 

218H, Total Protein 5.7L, Albumin 2.3L





Microbiology


3/18/20 Gram Stain - Final, Complete


          


3/18/20 Sputum Culture - Final, Complete


          Usual upper respiratory candice


          Moraxella catarrhalis


          Staphylococcus aureus


3/18/20 Urine Culture - Final, Complete


          NO GROWTH


3/18/20 Blood Culture - Final, Complete


          No growth





Assessment/Plan


Assessment/Plan


Assess & Plan/Chief Complaint


1.  Influenza B--done with Tx


2.  Severe Dehydration with Acute Renal Failure--back on IVF, Cr improved


3.  Sepsis with Staph Aureus--Cefepime restarted


4.  Scleroderma--looking at NH placement, patient unable to participate in 3hrs 

therapy to qualify for inpatient rehab


5.  Hypotension--improved


6.  Metabolic Acidosis with metabolic encephalopathy--improved


7.  Bilateral Cerebral Emboli with Ischemia--likely source is endocarditis but 

will proceed with CT angiogram since was unable to accomplish before due to 

Creatinine


8.  Anxiety/Agitation--add citalopram


9.  Choking--ST will back tomorrow to re evaluate





Clinical Quality Measures


Admission Status


Admission Dx


1.  Influenza B--treat with tamiflu


2.  Severe Dehydration with Acute Renal Failure--aggressive IVF rehydration and 

monitor BUN/Cr


3.  Hypotension--aggressive hydration and pressors if needed


4.  Metabolic Acidosis with Electrolyte Imbalance--Hydrate and replace 

electrolytes as needed


5.  Bacturia--culture urine and cover with maxipime until cultures final


6.  Scleroderma with Vasculitis--patient has not been on any immunosuppressive 

therapy or steroids for at least 3-4mos





DVT/VTE Risk/Contraindication:


Risk Factor Score Per Nursin


RFS Level Per Nursing on Admit:  4+=Very High











YENY DINH DO        Mar 24, 2020 14:08

## 2020-03-24 NOTE — NUR
CM/SS:  Phone Call to [facility] RN, Yulia, notifying that spouse has determined 
that he wants pt to be treated, not comfort care, and to put the Hospice referral on hold at 
this time.

## 2020-03-24 NOTE — DIAGNOSTIC IMAGING REPORT
INDICATION:  Dyspnea.



TECHNIQUE:  Single view chest 3:05 AM.



CORRELATION STUDY:  03/22/2020



FINDINGS: 

Right IJ central line unchanged. Heart size and mediastinum are

stable. Vasculature is slightly prominent.  

Minimal atelectasis suggested about both lung bases.



IMPRESSION: 

1. Generally stable chest with minimal perihilar and bibasilar

atelectasis.



Dictated by: 



  Dictated on workstation # [id number]

## 2020-03-24 NOTE — NUR
PT HAVING AGITATION AT THIS TIME AND SAYING STAFF IS TRYING TO KILL HER, PT WANTED TO SPEAK 
WITH HER . CALLED  AND LET PT TALK ON THE PHONE. PT GIVEN 4 MG MORPHINE AT 
2354. PT CONTINUES TO SPEAK WITH  AND IS GETTING MORE UPSET AND AGITATED. PT GIVEN 1 
MG OF ATIVAN AT 0022. SPOKE WITH  ABOUT CURRENT SITUATION AND IS UNDERSTANDING OF 
SITUATION. WILL CONTINUE TO MONITOR.

## 2020-03-24 NOTE — OCCUPATIONAL THERAPY EVAL
OT Evaluation-General/PLF


Medical Diagnosis


Admission Date


Mar 18, 2020 at 10:55


Medical Diagnosis:  Influenza B, Vlad


Onset Date:  Mar 23, 2020





Therapy Diagnosis


Therapy Diagnosis:  debility





Precautions


Precautions/Isolations:  Standard Precautions


Safety Interventions:  Bed Exit Alarm





Referral


Physician:  Radha





Medical History


Pertinent Medical History:  GERD


Current History


Pt was initially admitted via ED with influenza b and acute renal failure on 

03/18/20.  On 03/19/20 she was found to have (B) embolic ischemia with limited 

use of (B) UEs and aphasia.  Pt had been on hospice and is now off of hospice.





Social History


Home:  Single Level


Current Living Status:  Spouse





ADL-Prior Level of Function


SCALE: Activities may be completed with or without assistive devices.





6-Indepedent-patient completes the activity by him/herself with no assistance 

from a helper.


5-Set-up or Clean-up Assistance-helper sets up or cleans up; patient completes 

activity. [place] assists only prior to or  


    following the activity.


4-Supervision or Touching Assistance-helper provides verbal cues and/or 

touching/steadying and/or contact guard assistance as patient completes 

activity. Assistance may be provided   


    throughout the activity or intermittently.


3-Partial/Moderate Assistance-helper does LESS THAN HALF the effort. [place] 

lifts, holds or supports trunk or limbs, but provides less than half the effort.


2-Substantial/Maximal Assistance-helper does MORE THAN HALF the effort. [place] 

lifts or holds trunk or limbs and provides more than half the effort.


8-Ctyoxubwz-dbgqfz does ALL the effort. Patient does none of the effort to 

complete the activity. Or, the assistance of 2 or more helpers is required for 

the patient to complete the  


    activity.


If activity was not attempted, code reason:


7-Patient Refused.


9-Not Applicable-not attempted and the patient did not perform the activity 

before the current illness, exacerbation or injury.


10-Not Attempted due to Environmental Limitations-(lack of equipment, weather 

restraints, etc.).


88-Not Attempted due to Medical Conditions or Safety Concerns.


ADL PLOF Comments


Pt has some difficulty providing detailed information regarding PLOF. Pt states 

she lives with spouse who works, so she is often home alone. States she spends 

most of her time in bed. Pt states she is usually able to feed herself. Pt 

reports she doesn't usually get dressed every day since she is home alone. S

tates she does not get up to [facility].


Self Care:  Unknown


Functional Cognition:  Unknown





OT Current Status


Subjective


Pt in bed, agrees to therapy.





Mental Status/Objective


Patient Orientation:  Person


Attachments:  Cespedes Catheter, IV





Current


Upper Extremity ROM


Pt has bilateral hand and elbow contractures. Only able to flex elbow to 

~90degrees. Limited bilateral shoulder ROM.


Upper Extremity Coordination


impaired


Upper Extremity Strength


impaired.





ADL-Treatment


ADL-Current


Pt is currently dependent for all ADLs and mobility. Very minimal participation 

with encouragement. Limited UE movement, but contractures present at baseline. 

Pt's conversation difficult to follow. Pt states she would only like to be able 

to feed herself, but RN reports pt is currently unable to eat anything other 

than ice chips secondary to choking. Pt resting in bed with needs met after 

session. Discussed with RN.





OT Education/Plan


Problem List/Assessment


Assessment:  No Skilled OT Needs ID'd





Discharge Recommendations


Plan/Recommendations:  Discontinue OT





Treatment Plan/Plan of Care


Treatment,Training & Education:  No


Plan of Care:  OTHER (evaluation only)


Treatment Duration:  Mar 24, 2020


Frequency:  1 time per week (evaluation only)


Estimated Hrs Per Day:  Other


Rehab Potential:  Poor





Time/GCodes


Start Time:  15:28


Stop Time:  15:42


Total Time Billed (hr/min):  14


Billed Treatment Time


1 visit, SANDRA(14minutes)











NASIM OSBORN OT            Mar 24, 2020 16:12

## 2020-03-24 NOTE — PHYSICAL THERAPY EVALUATION
PT Evaluation-General


Medical Diagnosis


Admission Date


Mar 18, 2020 at 10:55


Medical Diagnosis:  Influenza B, Vlad


Onset Date:  Mar 23, 2020





Therapy Diagnosis


Therapy Diagnosis:  impaired mobility





Precautions


Precautions/Isolations:  Aspiration, Fall Prevention





Weight Bear Status


Right Lower Extremity:  Right


Full Weight Bearing


Left Lower Extremity:  Left


Full Weight Bearing





Referral


Physician:  Radha


Reason for Referral:  Evaluation/Treatment





Medical History


Pertinent Medical History:  GERD


Additional Medical History


scleroderma


Current History


Pt was initially admitted via ED with influenza b and acute renal failure on 

03/18/20.  On 03/19/20 she was found to have (B) embolic ischemia with limited 

use of (B) UEs and aphasia.  Pt had been on hospice and is now off of hospice.


Reviewed History:  Yes





Social History


Home:  Single Level


Current Living Status:  Spouse





Prior


Prior Level of Function


SCALE: Activities may be completed with or without assistive devices.





6-Indepedent-patient completes the activity by him/herself with no assistance 

from a helper.


5-Set-up or Clean-up Assistance-helper sets up or cleans up; patient completes 

activity. [place] assists only prior to or  


    following the activity.


4-Supervision or Touching Assistance-helper provides verbal cues and/or 

touching/steadying and/or contact guard assistance as patient completes 

activity. Assistance may be provided   


    throughout the activity or intermittently.


3-Partial/Moderate Assistance-helper does LESS THAN HALF the effort. [place] 

lifts, holds or supports trunk or limbs, but provides less than half the effort.


2-Substantial/Maximal Assistance-helper does MORE THAN HALF the effort. [place] 

lifts or holds trunk or limbs and provides more than half the effort.


3-Afvchvwnj-rozrws does ALL the effort. Patient does none of the effort to compl

ete the activity. Or, the assistance of 2 or more helpers is required for the 

patient to complete the  


    activity.


If activity was not attempted, code reason:


7-Patient Refused.


9-Not Applicable-not attempted and the patient did not perform the activity 

before the current illness, exacerbation or injury.


10-Not Attempted due to Environmental Limitations-(lack of equipment, weather 

restraints, etc.).


88-Not Attempted due to Medical Conditions or Safety Concerns.


Bed Mobility:  4


Transfers (B,C,W/C):  4


Gait:  1


Stairs:  1


Wheelchair Mobility:  5


Indoor Mobility (Ambulation):  Not [place]


Stairs:  Not [place]


Prior Devices Use:  Manual wheelchair


Pt states that she was able to perform supine to sit, slide transfer from the 

bed to a manual wheelchair and self propel through the home.  She was also able 

to transfer from the wheelchair to/from the commode without assist.





PT Evaluation-Current


Subjective


Pt is alert and able to communicate verbally.  She states that she is very weak.

 Pt does not report pain at this time.





Pt/Family Goals


Unknown at this time.





Objective


Patient Orientation:  Person, Place, Situation


Attachments:  Cespedes Catheter, IV





ROM/Strength


ROM Upper Extremities


Pt has (B) hand flexion contractures. (B) elbow flexion contracture at 90 

degrees.  (B) shoulder flexion/abduction limited to 20 degrees.


ROM Lower Extremities


(B) knee flexion is tolerated to 90 degrees, passively, AROM flexion to 40 

degrees.  Able to passively flex the hips to 60 degrees, AROM to 40 degrees.


Strength Upper Extremities


2/5 (B) shoulder, biceps, and triceps MMT


Strength Lower Extremities


3-/5 (B) hip flexion, hip extension, knee extension, and hamstring MMT.





Sensory


Vision:  Functional


Hearing:  Functional


Sensation Right Upper Extremit:  Intact


Sensation Left Upper Extremity:  Intact


Sensation Up. Extremities


Pt states that she can feel light touch stimuli in (B) UEs.


Sensation Right Lower Extremit:  Intact


Sensation Left Lower Extremity:  Intact





Transfers


Roll Left to Right (QC):  1


Sit to Lying (QC):  1


Lying to Sitting/Side of Bed(Q:  1


Sit to Stand (QC):  88


Chair/Bed-to-Chair Xfer(QC):  88


Toilet Transfer (QC):  88


Car Transfer (QC):  88


Pt required total assist for supine to/from sitting.  She initially required 

total assist to remain sitting upright.  After sitting for 3min she was able to 

steady herself and sit unassisted for 30 seconds.





Gait


Does the Patient Walk?:  No and Walking Goal NOT indicated


Mode of Locomotion:  Wheelchair


Anticipated Mode of Locomotion:  Wheelchair





Wheelchair Training


Does the Pt Use a Wheelchair?:  Yes


Wheel 50 ft with 2 turns (QC):  88


Wheel 150 ft (QC):  88


Type of Wheelchair:  Manual





Balance


Sitting Static:  Poor


Sitting Dynamic:  Poor





Assessment/Needs


Pt requires total assist for (B) LE ROM, bed mobility, and transfers.  She fully

participated with the evaluation but was limited in her ability.


Rehab Potential:  Poor





PT Short Term Goals


Short Term Goals


Time Frame:  Mar 31, 2020


Roll Left & Right:  3


Sit to lying:  3


Lying to sitting on side of be:  3


Chair/bed-to-chair transfer:  3


Toilet transfer:  3


Wheel 50ft w/2 turns:  2





PT Plan


Problem List


Problem List:  Activity Tolerance, Functional Strength, Balance, Transfer, Bed 

Mobility





Treatment/Plan


Treatment Plan:  Continue Plan of Care


Treatment Duration:  Mar 31, 2020


Frequency:  6 times per week


Estimated Hrs Per Day:  .25 hour per day


Patient and/or Family Agrees t:  Yes





Time/GCodes


Time In:  1500


Time Out:  1530


Total Billed Treatment Time:  30


Total Billed Treatment


1, maira 30











ELVA CHANDLER PT            Mar 24, 2020 15:42

## 2020-03-25 VITALS — SYSTOLIC BLOOD PRESSURE: 156 MMHG | DIASTOLIC BLOOD PRESSURE: 88 MMHG

## 2020-03-25 VITALS — SYSTOLIC BLOOD PRESSURE: 148 MMHG | DIASTOLIC BLOOD PRESSURE: 70 MMHG

## 2020-03-25 VITALS — DIASTOLIC BLOOD PRESSURE: 76 MMHG | SYSTOLIC BLOOD PRESSURE: 166 MMHG

## 2020-03-25 VITALS — SYSTOLIC BLOOD PRESSURE: 187 MMHG | DIASTOLIC BLOOD PRESSURE: 86 MMHG

## 2020-03-25 VITALS — SYSTOLIC BLOOD PRESSURE: 155 MMHG | DIASTOLIC BLOOD PRESSURE: 84 MMHG

## 2020-03-25 VITALS — DIASTOLIC BLOOD PRESSURE: 87 MMHG | SYSTOLIC BLOOD PRESSURE: 152 MMHG

## 2020-03-25 VITALS — SYSTOLIC BLOOD PRESSURE: 194 MMHG | DIASTOLIC BLOOD PRESSURE: 95 MMHG

## 2020-03-25 LAB
ALBUMIN SERPL-MCNC: 2.5 GM/DL (ref 3.2–4.5)
ALP SERPL-CCNC: 200 U/L (ref 40–136)
ALT SERPL-CCNC: 92 U/L (ref 0–55)
BASOPHILS # BLD AUTO: 0 10^3/UL (ref 0–0.1)
BASOPHILS NFR BLD AUTO: 0 % (ref 0–10)
BILIRUB SERPL-MCNC: 1.3 MG/DL (ref 0.1–1)
BUN/CREAT SERPL: 12
CALCIUM SERPL-MCNC: 7.3 MG/DL (ref 8.5–10.1)
CHLORIDE SERPL-SCNC: 106 MMOL/L (ref 98–107)
CO2 SERPL-SCNC: 24 MMOL/L (ref 21–32)
CREAT SERPL-MCNC: 0.49 MG/DL (ref 0.6–1.3)
EOSINOPHIL # BLD AUTO: 0 10^3/UL (ref 0–0.3)
EOSINOPHIL NFR BLD AUTO: 0 % (ref 0–10)
ERYTHROCYTE [DISTWIDTH] IN BLOOD BY AUTOMATED COUNT: 17.9 % (ref 10–14.5)
GFR SERPLBLD BASED ON 1.73 SQ M-ARVRAT: > 60 ML/MIN
GLUCOSE SERPL-MCNC: 110 MG/DL (ref 70–105)
HCT VFR BLD CALC: 29 % (ref 35–52)
HGB BLD-MCNC: 9.2 G/DL (ref 11.5–16)
LYMPHOCYTES # BLD AUTO: 1.7 X 10^3 (ref 1–4)
LYMPHOCYTES NFR BLD AUTO: 14 % (ref 12–44)
MANUAL DIFFERENTIAL PERFORMED BLD QL: NO
MCH RBC QN AUTO: 28 PG (ref 25–34)
MCHC RBC AUTO-ENTMCNC: 32 G/DL (ref 32–36)
MCV RBC AUTO: 87 FL (ref 80–99)
MONOCYTES # BLD AUTO: 0.6 X 10^3 (ref 0–1)
MONOCYTES NFR BLD AUTO: 5 % (ref 0–12)
NEUTROPHILS # BLD AUTO: 10.1 X 10^3 (ref 1.8–7.8)
NEUTROPHILS NFR BLD AUTO: 81 % (ref 42–75)
PLATELET # BLD: 210 10^3/UL (ref 130–400)
PMV BLD AUTO: 10.3 FL (ref 7.4–10.4)
POTASSIUM SERPL-SCNC: 3.3 MMOL/L (ref 3.6–5)
PROT SERPL-MCNC: 6.2 GM/DL (ref 6.4–8.2)
SODIUM SERPL-SCNC: 138 MMOL/L (ref 135–145)
WBC # BLD AUTO: 12.4 10^3/UL (ref 4.3–11)

## 2020-03-25 RX ADMIN — Medication PRN EACH: at 09:11

## 2020-03-25 RX ADMIN — SODIUM CHLORIDE SCH MLS/HR: 900 INJECTION INTRAVENOUS at 09:10

## 2020-03-25 RX ADMIN — LORAZEPAM PRN MG: 2 INJECTION INTRAMUSCULAR; INTRAVENOUS at 00:22

## 2020-03-25 RX ADMIN — ATENOLOL SCH MG: 25 TABLET ORAL at 20:02

## 2020-03-25 RX ADMIN — LORAZEPAM PRN MG: 2 INJECTION INTRAMUSCULAR; INTRAVENOUS at 11:47

## 2020-03-25 RX ADMIN — SODIUM CHLORIDE SCH MLS/HR: 900 INJECTION INTRAVENOUS at 14:59

## 2020-03-25 RX ADMIN — SODIUM CHLORIDE SCH MLS/HR: 900 INJECTION INTRAVENOUS at 17:48

## 2020-03-25 RX ADMIN — SCOPALAMINE SCH MG: 1 PATCH, EXTENDED RELEASE TRANSDERMAL at 17:48

## 2020-03-25 RX ADMIN — POTASSIUM CHLORIDE SCH MLS/HR: 200 INJECTION, SOLUTION INTRAVENOUS at 14:57

## 2020-03-25 RX ADMIN — POTASSIUM CHLORIDE SCH MLS/HR: 200 INJECTION, SOLUTION INTRAVENOUS at 12:31

## 2020-03-25 RX ADMIN — FLUCONAZOLE IN SODIUM CHLORIDE SCH MLS/HR: 2 INJECTION, SOLUTION INTRAVENOUS at 12:31

## 2020-03-25 RX ADMIN — MORPHINE SULFATE PRN MG: 4 INJECTION, SOLUTION INTRAMUSCULAR; INTRAVENOUS at 04:32

## 2020-03-25 RX ADMIN — ENOXAPARIN SODIUM SCH MG: 100 INJECTION SUBCUTANEOUS at 17:48

## 2020-03-25 RX ADMIN — ASPIRIN 81 MG CHEWABLE TABLET SCH MG: 81 TABLET CHEWABLE at 09:10

## 2020-03-25 RX ADMIN — POTASSIUM CHLORIDE, DEXTROSE MONOHYDRATE AND SODIUM CHLORIDE SCH MLS/HR: 75; 5; 450 INJECTION, SOLUTION INTRAVENOUS at 19:02

## 2020-03-25 RX ADMIN — MORPHINE SULFATE PRN MG: 4 INJECTION, SOLUTION INTRAMUSCULAR; INTRAVENOUS at 09:11

## 2020-03-25 NOTE — CARDIOLOGY PROGRESS NOTE
Cardiology SOAP Progress Note


Subjective:


Not communicating.





Objective:


I&O/Vital Signs











 3/25/20 3/25/20 3/25/20 3/25/20





 04:20 08:00 09:00 12:00


 


Temp 37.3 37.4  37.1


 


Pulse 119 122  77


 


Resp 18 18  20


 


B/P (MAP) 194/95 (128) 166/76 (106)  155/84 (107)


 


Pulse Ox 97 100 99 93


 


O2 Delivery Room Air Room Air Room Air Room Air














 3/25/20





 00:00


 


Intake Total 450 ml


 


Output Total 2700 ml


 


Balance -2250 ml








Constitutional:  other (cannot determine orientation, pt not verbally 

responsive, well developed, well nourished)


Respiratory:  No accessory muscle use; other (good bilat air entry)


Cardiovascular:  regular rate-rhythm, S1 and S2, systolic murmur (soft LANA at 

card base)


Gastrointestional:  No tender; soft; No guarding, No rebound; audible bowel 

sounds


Extremities:  No clubbing, No cyanosis, No significant edema


Neurologic/Psychiatric:  other (aphasic and essentially unresponsive, cannot 

cooperate with a neuro exam)


Skin:  warm/dry, other (scleroderma skin changes noted to face and extremities)





Results/Procedures:


Labs


Laboratory Tests


3/25/20 04:39: 


White Blood Count 12.4H, Red Blood Count 3.33L, Hemoglobin 9.2L, Hematocrit 29L,

Mean Corpuscular Volume 87, Mean Corpuscular Hemoglobin 28, Mean Corpuscular 

Hemoglobin Concent 32, Red Cell Distribution Width 17.9H, Platelet Count 210, 

Mean Platelet Volume 10.3, Neutrophils (%) (Auto) 81H, Lymphocytes (%) (Auto) 

14, Monocytes (%) (Auto) 5, Eosinophils (%) (Auto) 0, Basophils (%) (Auto) 0, 

Neutrophils # (Auto) 10.1H, Lymphocytes # (Auto) 1.7, Monocytes # (Auto) 0.6, 

Eosinophils # (Auto) 0.0, Basophils # (Auto) 0.0, Sodium Level 138, Potassium 

Level 3.3L, Chloride Level 106, Carbon Dioxide Level 24, Anion Gap 8, Blood Urea

Nitrogen 6L, Creatinine 0.49L, Estimat Glomerular Filtration Rate > 60, 

BUN/Creatinine Ratio 12, Glucose Level 110H, Calcium Level 7.3L, Corrected 

Calcium 8.5, Total Bilirubin 1.3H, Aspartate Amino Transf (AST/SGOT) 59H, 

Alanine Aminotransferase (ALT/SGPT) 92H, Alkaline Phosphatase 200H, Total 

Protein 6.2L, Albumin 2.5L





Microbiology


3/18/20 Gram Stain - Final, Complete


          


3/18/20 Sputum Culture - Final, Complete


          Usual upper respiratory candice


          Moraxella catarrhalis


          Staphylococcus aureus


3/18/20 Urine Culture - Final, Complete


          NO GROWTH


3/18/20 Blood Culture - Final, Complete


          No growth








A/P:


Assessment/Dx:


Influenza B





Severe sepsis, presentation with septic shock





Questionable staphylococcal septicemia (one out of two blood cultures positive 

for staph aureus and strep pyogenes)





Mental status changes: sepsis vs stroke





Recent vs ac CVA. MRI on 3/20/20: Small punctate foci of acute ischemia 

involving the left corona radiata and right parietal lobe. No mass effect or 

hemorrhage. Given the bilateral distribution and punctate size, there is concern

for embolic source.





Echo on 3/20/30 (Dr Gillespie): LVEF 80-85%, grade 1 mcgraw dysfunction, PASP 55-60 

mmHg. No evidence of endocarditis or intracardiac thrombus on TTE of 3/20/20





Liver enzyme elevation of undetermined etiology, worsening, managed by the Med 

Svce





Extensive cholelithiasis without bile duct dilatation on CT abdomen on 3/21/20, 

managed by the Med Svce





Anemia of undetermined etiology (may be dilutional due to iv fluid admin)





Acute renal failure, likely ATN due to septic shock, improving





Metabolic acidosis, improving





Scleroderma, treated chronically with steroids





No significant disease of either carotid bifurcation per carotid u/s of 3/20/20





Hypertension


Plan:


Plan:





* I reviewed Dr Garcia's notes


* Dr. Ortega yesterday discussed the case with patient's . He does not 

  wish to pursue further cardiac or other w/u. Desires comfort care only


* Cardiology will sign off.  Please call if further advise as needed.








Thank you for your consultation. Please call me if you have any questions.








RAY Arreaga MD, FACP, FACC, FSCAI, FHRS, CCDS


Interventional Cardiology


Cardiac Electrophysiology


Vascular Medicine and Endovascular Interventions





Clinical Quality Measures


Type of Care:


Type of Care:  Comfort Measures











ROSAMARIA ARREAGA MD             Mar 25, 2020 13:31

## 2020-03-25 NOTE — DIAGNOSTIC IMAGING REPORT
INDICATION: Dyspnea.



Comparison with 03/24/2020.



FINDINGS: Heart is not enlarged. Lungs are well-aerated. Mild

interstitial prominence of the lungs along the perihilar regions

again noted. No pneumothorax. Could not exclude small left

basilar pleural effusion.



IMPRESSION:

1. Persistent mild perihilar infiltrate with questionable small

left basilar pleural effusion.



Dictated by: 



  Dictated on workstation # [id number]

## 2020-03-25 NOTE — NUR
CM/SS:  Visited with pt as to how she is doing today, as well as allow spouse to speak to pt 
via speaker on cell phone.



Plan: Pt to be dismissed to skilled nursing facility - possible [facility] 



Summary:  Pt seems to be doing better today with her thought process.  Pt is with the aide 
and is eating some, by talking small bites.  Pt did take her CT scan earlier on this date.  
Spouse is given report that pt is doing better today. Spouse is glad to hear that.   Pt is 
requesting her Ipad, cell phone, and glasses. Spouse reports he will bring those items up to 
the pt.  He will drop off  at the  and someone can go down to get them, based on 
the no visitor policy.  He is ok with that.  He does encourage pt to continue to cooperate 
with staff and to take her medication.  Pt shakes her head as spouse is talking with her, as 
to indicate understanding.  This worker will follow up.  



Phone call to [facility] - additional information is faxed to them on pt's progress 
with physical therapy and occupational therapy.  

They are still trying to determine what the out of pocket cost will be for family based on 
the insurance with [facility], as it was initially 10% out of pocket.  Date of 
discharge to be determined.  They verbalize understanding.

## 2020-03-25 NOTE — SPEECH THERAPY PROGRESS NOTE
Therapy Progress Note


 spoke with fourth floor [place] clerk to have patient downgraded to nectar due 

to choking on thin liquids.











RACHID ROSAS            Mar 25, 2020 07:53

## 2020-03-25 NOTE — DIAGNOSTIC IMAGING REPORT
INDICATION: Intracranial emboli.



TECHNIQUE: Post IV contrast-enhanced CT angio neck performed with

2D and 3D reconstructions.



FINDINGS: The visualized top of the thoracic aortic arch is

patent as variant vascular anatomy with bovine arch, the left

common carotid arises off the innominate as a common incidental

variant. Subclavian arteries where visualized are patent

bilaterally. The vertebral arteries are patent, the left

dominant, the right somewhat small diffusely but nonfocal and not

pathologic. We note that the right vertebral artery terminates

intracranially as the PICA, also as a variant. The common

carotids are patent. The carotid bulbs and bifurcations are

patent. Cervical internal carotid arteries are widely patent. No

luminal irregularity, plaque, stenosis, dissection, or occlusion.



This patient is noted to have multiple bilateral thyroid nodules

of varying degrees of density, likely reflecting masses with

varying degrees of intralesional cystic degeneration. Nonemergent

correlative thyroid ultrasound recommended. This patient has an

indwelling central venous catheter extending into the visualized

upper thoracic SVC and going below the field-of-view. There is

disc material partly calcified as well as endplate osteophytes

resulting in multilevel canal stenoses, most severe at C5-C6 but

substantial at the C4-C5 level.



The intracranial ICAs are patent. The bilateral A1 segments

patent. The ACOM is identified. The paired anterior cerebral

arteries are patent, where included in the field-of-view. The

bilateral middle cerebral arterial segments and primary branches

are patent. No large vessel occlusion or identifiable

intraluminal thrombus. The intradural right vertebral terminates

as the PICA. The intradural left vertebral and basilar are widely

patent. PCAs are patent. The right PCA is of fetal origin with

prominent PCOM. No aneurysm.



IMPRESSION:

1. No substantial cervical or intracranial arterial pathology.

2. Cervical spondylosis. Disc protrusion at the midline with

endplate osteophytes with severe stenosis of the canal at C5-C6

and at least moderate stenosis at C4-C5.

3. Indeterminate thyroid nodules, nonemergent outpatient

sonographic correlation recommended.



Dictated by: 



  Dictated on workstation # [id number]

## 2020-03-25 NOTE — PHYSICAL THERAPY DAILY NOTE
PT Daily Note-Current


Subjective


Patient in bed pre tx, agrees to PT, seems to indicate that she doesn't have any

pain, patient mumbles and speech is hard to understand, she seems concerned 

about getting OT.





Appearance


Patient BTB post tx with nurse call, laying on left side with pillow support for

pressure relief, pillows under both legs.





Mental Status


Patient Orientation:  Person, Confused, Mumbles


Attachments:  Cespedes Catheter, IV





Transfers


SCALE: Activities may be completed with or without assistive devices.





6-Indepedent-patient completes the activity by him/herself with no assistance 

from a helper.


5-Set-up or Clean-up Assistance-helper sets up or cleans up; patient completes 

activity. [place] assists only prior to or  


    following the activity.


4-Supervision or Touching Assistance-helper provides verbal cues and/or 

touching/steadying and/or contact guard assistance as patient completes 

activity. Assistance may be provided   


    throughout the activity or intermittently.


3-Partial/Moderate Assistance-helper does LESS THAN HALF the effort. [place] 

lifts, holds or supports trunk or limbs, but provides less than half the effort.


2-Substantial/Maximal Assistance-helper does MORE THAN HALF the effort. [place] 

lifts or holds trunk or limbs and provides more than half the effort.


4-Wswgywhjh-jdqlwz does ALL the effort. Patient does none of the effort to 

complete the activity. Or, the assistance of 2 or more helpers is required for 

the patient to complete the  


    activity.


If activity was not attempted, code reason:


7-Patient Refused.


9-Not Applicable-not attempted and the patient did not perform the activity 

before the current illness, exacerbation or injury.


10-Not Attempted due to Environmental Limitations-(lack of equipment, weather 

restraints, etc.).


88-Not Attempted due to Medical Conditions or Safety Concerns.


Roll Left & Right (QC):  1


Sit to Lying (QC):  1


Lying to Sitting/Side of Bed(Q:  1


Patient was able to sit on her own after a couple of minutes and cues for 

positioning.





Weight Bearing


Right Lower Extremity:  Right


Full Weight Bearing


Left Lower Extremity:  Left


Full Weight Bearing





Exercises


Seated Therapy Exercises:  Kicking activity


Seated Reps:  20





Treatments


sitting, LE exercise





Assessment


Current Status:  Poor Progress


patient has contracted hands, poor general movement





PT Short Term Goals


Short Term Goals


Time Frame:  Mar 31, 2020


Roll Left & Right:  3


Sit to lying:  3


Lying to sitting on side of be:  3


Chair/bed-to-chair transfer:  3


Toilet transfer:  3


Wheel 50ft w/2 turns:  2





PT Plan


Problem List


Problem List:  Activity Tolerance, Functional Strength, Safety, Balance, Gait, 

Transfer, Bed Mobility, ROM





Treatment/Plan


Treatment Plan:  Continue Plan of Care


Treatment Plan:  Bed Mobility, Education, Functional Activity Hillary, Functional 

Strength, Gait, Safety, Therapeutic Exercise, Transfers


Treatment Duration:  Mar 31, 2020


Frequency:  6 times per week


Estimated Hrs Per Day:  .25 hour per day


Patient and/or Family Agrees t:  Yes





Safety Risks/Education


Patient Education:  Correct Positioning, Safety Issues


Teaching Recipient:  Patient


Teaching Methods:  Demonstration, Discussion


Response to Teaching:  Reinforcement Needed





Time/GCodes


Time In:  0850


Time Out:  0905


Total Billed Treatment


1 visit


FA 15'











MAYELIN LAI PT                Mar 25, 2020 09:13

## 2020-03-25 NOTE — PROGRESS NOTE
Subjective


Date Seen by a Provider:  Mar 25, 2020


Time Seen by a Provider:  17:00


Subjective/Events-last exam


Fwup influenza B, sepsis, bilateral emboli with ischemic stroke.  Awake today 

and more cooperative.





Objective


Exam





Vital Signs








  Date Time  Temp Pulse Resp B/P (MAP) Pulse Ox O2 Delivery O2 Flow Rate FiO2


 


3/25/20 16:31 37.1 97 18 156/88 (110) 97 Room Air  


 


3/25/20 12:00 37.1 77 20 155/84 (107) 93 Room Air  


 


3/25/20 09:00     99 Room Air  


 


3/25/20 08:00 37.4 122 18 166/76 (106) 100 Room Air  


 


3/25/20 04:20 37.3 119 18 194/95 (128) 97 Room Air  


 


3/25/20 00:23 37.8 125 20 187/86 (119) 98 Room Air  


 


3/24/20 20:49 37.8 122 20 171/90 (117) 94 Room Air  


 


3/24/20 18:37      Room Air  














I & O 


 


 3/25/20





 07:00


 


Intake Total 450 ml


 


Output Total 4300 ml


 


Balance -3850 ml





Capillary Refill : Greater Than 3 Seconds


General Appearance:  No Apparent Distress


Neck:  Supple


Respiratory:  Lungs Clear


Cardiovascular:  Regular Rate, Rhythm


Gastrointestinal:  normal bowel sounds, non tender, soft


Extremity:  Non Tender, No Calf Tenderness, No Pedal Edema


Neurologic/Psychiatric:  Alert





Results


Lab


Laboratory Tests


3/25/20 04:39: 


White Blood Count 12.4H, Red Blood Count 3.33L, Hemoglobin 9.2L, Hematocrit 29L,

Mean Corpuscular Volume 87, Mean Corpuscular Hemoglobin 28, Mean Corpuscular 

Hemoglobin Concent 32, Red Cell Distribution Width 17.9H, Platelet Count 210, 

Mean Platelet Volume 10.3, Neutrophils (%) (Auto) 81H, Lymphocytes (%) (Auto) 

14, Monocytes (%) (Auto) 5, Eosinophils (%) (Auto) 0, Basophils (%) (Auto) 0, 

Neutrophils # (Auto) 10.1H, Lymphocytes # (Auto) 1.7, Monocytes # (Auto) 0.6, 

Eosinophils # (Auto) 0.0, Basophils # (Auto) 0.0, Sodium Level 138, Potassium 

Level 3.3L, Chloride Level 106, Carbon Dioxide Level 24, Anion Gap 8, Blood Urea

Nitrogen 6L, Creatinine 0.49L, Estimat Glomerular Filtration Rate > 60, 

BUN/Creatinine Ratio 12, Glucose Level 110H, Calcium Level 7.3L, Corrected 

Calcium 8.5, Total Bilirubin 1.3H, Aspartate Amino Transf (AST/SGOT) 59H, 

Alanine Aminotransferase (ALT/SGPT) 92H, Alkaline Phosphatase 200H, Total 

Protein 6.2L, Albumin 2.5L





Microbiology


3/18/20 Gram Stain - Final, Complete


          


3/18/20 Sputum Culture - Final, Complete


          Usual upper respiratory candice


          Moraxella catarrhalis


          Staphylococcus aureus


3/18/20 Urine Culture - Final, Complete


          NO GROWTH


3/18/20 Blood Culture - Final, Complete


          No growth





Assessment/Plan


Assessment/Plan


Assess & Plan/Chief Complaint


1.  Influenza B--done with Tx


2.  Severe Dehydration with Acute Renal Failure--back on IVF, Cr improved


3.  Sepsis with Staph Aureus--change to rocephin, will need 5 weeks IV abx on DC


4.  Scleroderma--looking at SNF for DC


5.  Hypotension--improved


6.  Metabolic Acidosis with metabolic encephalopathy--improved


7.  Bilateral Cerebral Emboli with Ischemia--likely source is endocarditis as CT

angiogram is negative but  has decided on no BLANCHE


8.  Anxiety/Agitation--started citalopram


9.  Choking--ST adjusted to nectar consistency


10. Hypertension/Tachycardia--Atenolol started


11. Hypokalemia--potassium replaced





Clinical Quality Measures


Admission Status


Admission Dx


1.  Influenza B--treat with tamiflu


2.  Severe Dehydration with Acute Renal Failure--aggressive IVF rehydration and 

monitor BUN/Cr


3.  Hypotension--aggressive hydration and pressors if needed


4.  Metabolic Acidosis with Electrolyte Imbalance--Hydrate and replace 

electrolytes as needed


5.  Bacturia--culture urine and cover with maxipime until cultures final


6.  Scleroderma with Vasculitis--patient has not been on any immunosuppressive 

therapy or steroids for at least 3-4mos





DVT/VTE Risk/Contraindication:


Risk Factor Score Per Nursin


RFS Level Per Nursing on Admit:  4+=Very High











YENY DINH DO        Mar 25, 2020 17:02

## 2020-03-26 VITALS — SYSTOLIC BLOOD PRESSURE: 158 MMHG | DIASTOLIC BLOOD PRESSURE: 80 MMHG

## 2020-03-26 VITALS — SYSTOLIC BLOOD PRESSURE: 137 MMHG | DIASTOLIC BLOOD PRESSURE: 66 MMHG

## 2020-03-26 VITALS — DIASTOLIC BLOOD PRESSURE: 88 MMHG | SYSTOLIC BLOOD PRESSURE: 189 MMHG

## 2020-03-26 VITALS — DIASTOLIC BLOOD PRESSURE: 91 MMHG | SYSTOLIC BLOOD PRESSURE: 196 MMHG

## 2020-03-26 VITALS — DIASTOLIC BLOOD PRESSURE: 82 MMHG | SYSTOLIC BLOOD PRESSURE: 185 MMHG

## 2020-03-26 VITALS — DIASTOLIC BLOOD PRESSURE: 91 MMHG | SYSTOLIC BLOOD PRESSURE: 191 MMHG

## 2020-03-26 LAB
ALBUMIN SERPL-MCNC: 2.4 GM/DL (ref 3.2–4.5)
ALP SERPL-CCNC: 214 U/L (ref 40–136)
ALT SERPL-CCNC: 67 U/L (ref 0–55)
BASOPHILS # BLD AUTO: 0 10^3/UL (ref 0–0.1)
BASOPHILS NFR BLD AUTO: 0 % (ref 0–10)
BILIRUB SERPL-MCNC: 0.9 MG/DL (ref 0.1–1)
BUN/CREAT SERPL: 9
CALCIUM SERPL-MCNC: 7.2 MG/DL (ref 8.5–10.1)
CHLORIDE SERPL-SCNC: 105 MMOL/L (ref 98–107)
CO2 SERPL-SCNC: 22 MMOL/L (ref 21–32)
CREAT SERPL-MCNC: 0.45 MG/DL (ref 0.6–1.3)
EOSINOPHIL # BLD AUTO: 0.1 10^3/UL (ref 0–0.3)
EOSINOPHIL NFR BLD AUTO: 1 % (ref 0–10)
ERYTHROCYTE [DISTWIDTH] IN BLOOD BY AUTOMATED COUNT: 17.5 % (ref 10–14.5)
GFR SERPLBLD BASED ON 1.73 SQ M-ARVRAT: > 60 ML/MIN
GLUCOSE SERPL-MCNC: 101 MG/DL (ref 70–105)
HCT VFR BLD CALC: 28 % (ref 35–52)
HGB BLD-MCNC: 8.7 G/DL (ref 11.5–16)
LYMPHOCYTES # BLD AUTO: 1.3 X 10^3 (ref 1–4)
LYMPHOCYTES NFR BLD AUTO: 13 % (ref 12–44)
MAGNESIUM SERPL-MCNC: 1.3 MG/DL (ref 1.6–2.4)
MANUAL DIFFERENTIAL PERFORMED BLD QL: NO
MCH RBC QN AUTO: 28 PG (ref 25–34)
MCHC RBC AUTO-ENTMCNC: 32 G/DL (ref 32–36)
MCV RBC AUTO: 87 FL (ref 80–99)
MONOCYTES # BLD AUTO: 0.5 X 10^3 (ref 0–1)
MONOCYTES NFR BLD AUTO: 6 % (ref 0–12)
NEUTROPHILS # BLD AUTO: 7.8 X 10^3 (ref 1.8–7.8)
NEUTROPHILS NFR BLD AUTO: 81 % (ref 42–75)
PLATELET # BLD: 225 10^3/UL (ref 130–400)
PMV BLD AUTO: 10.2 FL (ref 7.4–10.4)
POTASSIUM SERPL-SCNC: 3.3 MMOL/L (ref 3.6–5)
PROT SERPL-MCNC: 6 GM/DL (ref 6.4–8.2)
SODIUM SERPL-SCNC: 135 MMOL/L (ref 135–145)
WBC # BLD AUTO: 9.6 10^3/UL (ref 4.3–11)

## 2020-03-26 RX ADMIN — Medication SCH MG: at 17:37

## 2020-03-26 RX ADMIN — POTASSIUM CHLORIDE, DEXTROSE MONOHYDRATE AND SODIUM CHLORIDE SCH MLS/HR: 75; 5; 450 INJECTION, SOLUTION INTRAVENOUS at 04:23

## 2020-03-26 RX ADMIN — MAGNESIUM SULFATE IN DEXTROSE SCH MLS/HR: 10 INJECTION, SOLUTION INTRAVENOUS at 12:27

## 2020-03-26 RX ADMIN — ASPIRIN 81 MG CHEWABLE TABLET SCH MG: 81 TABLET CHEWABLE at 08:00

## 2020-03-26 RX ADMIN — POTASSIUM CHLORIDE SCH MLS/HR: 200 INJECTION, SOLUTION INTRAVENOUS at 12:27

## 2020-03-26 RX ADMIN — ATENOLOL SCH MG: 25 TABLET ORAL at 08:00

## 2020-03-26 RX ADMIN — FLUCONAZOLE IN SODIUM CHLORIDE SCH MLS/HR: 2 INJECTION, SOLUTION INTRAVENOUS at 08:01

## 2020-03-26 RX ADMIN — ATENOLOL SCH MG: 50 TABLET ORAL at 20:29

## 2020-03-26 RX ADMIN — MAGNESIUM SULFATE IN DEXTROSE SCH MLS/HR: 10 INJECTION, SOLUTION INTRAVENOUS at 13:23

## 2020-03-26 RX ADMIN — POTASSIUM CHLORIDE SCH MLS/HR: 200 INJECTION, SOLUTION INTRAVENOUS at 14:16

## 2020-03-26 RX ADMIN — Medication PRN EACH: at 20:35

## 2020-03-26 RX ADMIN — POTASSIUM CHLORIDE, DEXTROSE MONOHYDRATE AND SODIUM CHLORIDE SCH MLS/HR: 75; 5; 450 INJECTION, SOLUTION INTRAVENOUS at 17:36

## 2020-03-26 RX ADMIN — POTASSIUM CHLORIDE SCH MLS/HR: 200 INJECTION, SOLUTION INTRAVENOUS at 15:20

## 2020-03-26 RX ADMIN — SODIUM CHLORIDE SCH MLS/HR: 900 INJECTION INTRAVENOUS at 17:37

## 2020-03-26 RX ADMIN — ENOXAPARIN SODIUM SCH MG: 100 INJECTION SUBCUTANEOUS at 17:37

## 2020-03-26 RX ADMIN — POTASSIUM CHLORIDE SCH MLS/HR: 200 INJECTION, SOLUTION INTRAVENOUS at 13:24

## 2020-03-26 NOTE — PROGRESS NOTE
Subjective


Date Seen by a Provider:  Mar 26, 2020


Time Seen by a Provider:  12:10


Subjective/Events-last exam


Fwup influenza B, sepsis, bilateral emboli with ischemic stroke, HTN.  Sitting 

up in bed--ate a little more this morning.





Objective


Exam





Vital Signs








  Date Time  Temp Pulse Resp B/P (MAP) Pulse Ox O2 Delivery O2 Flow Rate FiO2


 


3/26/20 09:00    189/88 (121)    


 


3/26/20 09:00     100 Room Air  


 


3/26/20 08:00 37.1 88 20 196/91 (126) 100 Room Air  


 


3/26/20 04:28 37.1 86 20 137/66 (89) 100 Room Air  


 


3/25/20 23:45 37.6 96 20 148/70 (96) 95 Room Air  


 


3/25/20 20:06 37.1 94 18 152/87 (108) 100 Room Air  


 


3/25/20 16:31 37.1 97 18 156/88 (110) 97 Room Air  














I & O 


 


 3/26/20





 07:00


 


Intake Total 2320 ml


 


Output Total 2550 ml


 


Balance -230 ml





Capillary Refill : Less Than 3 SecondsGreater Than 3 Seconds


General Appearance:  No Apparent Distress


Neck:  Supple


Respiratory:  Lungs Clear


Cardiovascular:  Regular Rate, Rhythm


Gastrointestinal:  normal bowel sounds, non tender, soft


Extremity:  Non Tender, No Calf Tenderness, No Pedal Edema


Neurologic/Psychiatric:  Alert, Oriented x3


Skin:  Other (left ankle with dry dressing in place)





Results


Lab


Laboratory Tests


3/26/20 04:33: 


White Blood Count 9.6, Red Blood Count 3.16L, Hemoglobin 8.7L, Hematocrit 28L, 

Mean Corpuscular Volume 87, Mean Corpuscular Hemoglobin 28, Mean Corpuscular 

Hemoglobin Concent 32, Red Cell Distribution Width 17.5H, Platelet Count 225, 

Mean Platelet Volume 10.2, Neutrophils (%) (Auto) 81H, Lymphocytes (%) (Auto) 

13, Monocytes (%) (Auto) 6, Eosinophils (%) (Auto) 1, Basophils (%) (Auto) 0, 

Neutrophils # (Auto) 7.8, Lymphocytes # (Auto) 1.3, Monocytes # (Auto) 0.5, 

Eosinophils # (Auto) 0.1, Basophils # (Auto) 0.0, Sodium Level 135, Potassium 

Level 3.3L, Chloride Level 105, Carbon Dioxide Level 22, Anion Gap 8, Blood Urea

Nitrogen 4L, Creatinine 0.45L, Estimat Glomerular Filtration Rate > 60, 

BUN/Creatinine Ratio 9, Glucose Level 101, Calcium Level 7.2L, Corrected Calcium

8.5, Magnesium Level 1.3L, Total Bilirubin 0.9, Aspartate Amino Transf 

(AST/SGOT) 39H, Alanine Aminotransferase (ALT/SGPT) 67H, Alkaline Phosphatase 

214H, Total Protein 6.0L, Albumin 2.4L





Microbiology


3/18/20 Gram Stain - Final, Complete


          


3/18/20 Sputum Culture - Final, Complete


          Usual upper respiratory candice


          Moraxella catarrhalis


          Staphylococcus aureus


3/18/20 Urine Culture - Final, Complete


          NO GROWTH


3/18/20 Blood Culture - Final, Complete


          No growth





Assessment/Plan


Assessment/Plan


Assess & Plan/Chief Complaint


1.  Influenza B--done with Tx


2.  Severe Dehydration with Acute Renal Failure--back on IVF, Cr improved


3.  Sepsis with Staph Aureus--on rocephin, will need 5 weeks IV abx on DC


4.  Scleroderma--looking at SNF for DC tomorrow--[facility]


5.  Hypotension--improved


6.  Metabolic Acidosis with metabolic encephalopathy--improved


7.  Bilateral Cerebral Emboli with Ischemia--likely source is endocarditis as CT

angiogram is negative but  has decided on no BLANCHE


8.  Anxiety/Agitation--started citalopram


9.  Choking--ST adjusted to nectar consistency


10. Hypertension/Tachycardia--Increase atenolol dose


11. Hypokalemia--potassium IV and oral


12. Hypomagnesemia--Magnesium IV and oral





Clinical Quality Measures


Admission Status


Admission Dx


1.  Influenza B--treat with tamiflu


2.  Severe Dehydration with Acute Renal Failure--aggressive IVF rehydration and 

monitor BUN/Cr


3.  Hypotension--aggressive hydration and pressors if needed


4.  Metabolic Acidosis with Electrolyte Imbalance--Hydrate and replace 

electrolytes as needed


5.  Bacturia--culture urine and cover with maxipime until cultures final


6.  Scleroderma with Vasculitis--patient has not been on any immunosuppressive 

therapy or steroids for at least 3-4mos





DVT/VTE Risk/Contraindication:


Risk Factor Score Per Nursin


RFS Level Per Nursing on Admit:  4+=Very High











YENY DINH DO        Mar 26, 2020 12:13

## 2020-03-26 NOTE — PHYSICAL THERAPY DAILY NOTE
PT Daily Note-Current


Subjective


Pt laying Supine in bed with B LE up on pillows.  Pt agrees to PT.





Pain





   Location:  No Pain Reported





Mental Status


Patient Orientation:  Person, Place, Mumbles


Attachments:  Cespedes Catheter





Transfers


SCALE: Activities may be completed with or without assistive devices.





6-Indepedent-patient completes the activity by him/herself with no assistance 

from a helper.


5-Set-up or Clean-up Assistance-helper sets up or cleans up; patient completes 

activity. [place] assists only prior to or  


    following the activity.


4-Supervision or Touching Assistance-helper provides verbal cues and/or 

touching/steadying and/or contact guard assistance as patient completes 

activity. Assistance may be provided   


    throughout the activity or intermittently.


3-Partial/Moderate Assistance-helper does LESS THAN HALF the effort. Maxie lift

s, holds or supports trunk or limbs, but provides less than half the effort.


2-Substantial/Maximal Assistance-helper does MORE THAN HALF the effort. Maxie 

lifts or holds trunk or limbs and provides more than half the effort.


1-Cgeguczhf-gadmzg does ALL the effort. Patient does none of the effort to 

complete the activity. Or, the assistance of 2 or more helpers is required for 

the patient to complete the  


    activity.


If activity was not attempted, code reason:


7-Patient Refused.


9-Not Applicable-not attempted and the patient did not perform the activity 

before the current illness, exacerbation or injury.


10-Not Attempted due to Environmental Limitations-(lack of equipment, weather 

restraints, etc.).


88-Not Attempted due to Medical Conditions or Safety Concerns.





Weight Bearing


Right Lower Extremity:  Right


Full Weight Bearing


Left Lower Extremity:  Left


Full Weight Bearing





Exercises


Supine Ex:  Ankle pumps, Quad Set, Heel Slides, Straight leg raise, Hip abd/add


Supine Reps:  15





Treatments


PTA assists pt with dry mouth by wetting sponge & moving it as needed.  Pt 

completes Supine Ex in bed.  SW visit with pt during Rx about possible D/C 

depending on what Dr Garcia thinks after visiting pt in a little while.  Pt 

resting in bed with all needs met, call light in lap.





Assessment


Current Status:  Good Progress


Pt demonstrates improved strength and ROM.





PT Short Term Goals


Short Term Goals


Time Frame:  Mar 31, 2020


Roll Left & Right:  3


Sit to lying:  3


Lying to sitting on side of be:  3


Chair/bed-to-chair transfer:  3


Toilet transfer:  3


Wheel 50ft w/2 turns:  2





PT Plan


Problem List


Problem List:  Activity Tolerance, Functional Strength





Treatment/Plan


Treatment Plan:  Continue Plan of Care


Treatment Plan:  Bed Mobility, Education, Functional Activity Hillary, Functional 

Strength, Gait, Safety, Therapeutic Exercise, Transfers


Treatment Duration:  Mar 31, 2020


Frequency:  6 times per week


Estimated Hrs Per Day:  .25 hour per day


Patient and/or Family Agrees t:  Yes





Safety Risks/Education


Patient Education:  Correct Positioning, Safety Issues


Teaching Recipient:  Patient


Teaching Methods:  Discussion


Response to Teaching:  Verbalize Understanding





Time/GCodes


Time In:  1115


Time Out:  1140


Total Billed Treatment Time:  25


Total Billed Treatment


1, FA (10m) & EX (15m)











BEL BLACKWELL PTA              Mar 26, 2020 12:11

## 2020-03-26 NOTE — DISCHARGE INST-SKILLED NURSING
Discharge Inst-Skilled NF


Reconcile Patient Problems


Problems Reviewed?:  Yes





Patient Instructions


Patient Problems:  


Bacterial Endocarditis


Bilateral Ischemic Stroke


Recent Sepsis


Hypertension


Scleroderma with Chronic Vasculitis


Goal:  


To return home





Consult/Follow Up/Orders


Follow Up Appt.:  


Will determine as COVID-19 pandemic progresses


Skilled NF Admit to:  [facility]


Certification (SNF)


I certify that SNF services are required to be given on an inpatient basis 

because of the above named patient's need for skilled nursing care on a 

continuing basis for the conditions(s) for which he/she was receiving inpatient 

hospital services prior to his/her transfer to the SNF.


Skilled Nursing Facility Order:  Nursing Services, Occupational Ther-Evaluate & 

Treat, Physical Therapy-Evaluate & Treat, Speech Language-Evaluate & Treat, 

Wound Care-Eval/Treat


Oxygen Delivery Method:  Room Air


Discharge Diet:  Other Diet (Nectar consistency)


Daily Activity as Tolerated:  Yes


Resuscitation Status:  Do Not Resuscitate





New & Resume Previous Orders


New & Resume Previous Orders


CBC and CMP in 2 weeks


Blood Pressure checks twice a week


Faye Garcia 


Mar 26, 2020 


16:17











FAYE GARCIA DO        Mar 26, 2020 16:21

## 2020-03-26 NOTE — DIAGNOSTIC IMAGING REPORT
INDICATION: Dyspnea



Single PA view of the chest is obtained with comparison made to

study of one day earlier. Heart size and pulmonary vascularity

are within normal limits.  There is no pneumothorax or

consolidation. Right jugular central venous catheter remains in

stable position. There is no definite pleural fluid.



IMPRESSION: No acute abnormality.



Dictated by: 



  Dictated on workstation # [id number]

## 2020-03-26 NOTE — NUR
CM/SS:  Visited with pt at to plan for discharge 



Plan:  Pt will be discharged to [facility] on tomorrow. 



Summary:  Additional records are faxed to [facility].  [facility] the pt tomorrow at 10am.  



Telephone call to spouse Sudarshan, [phone], to notify him of the Admission acceptance 
and that they will  pt at 10am. in the morning.  He is aware that the facility will 
call him for some additional information.  



Patient is notified that she will be able to go to [facility] in the morning, at 
10am.  She is ok with the plan.  Pt does not have any clothes here.  This worker is able to 
get pt some clothes from the clothes closet so that she can have some and spouse does not 
have to make a trip to the hospital to drop them off.  A pair of pants and a shirt are given 
to pt for her to wear on tomorrow.  



Physician Radha notified of the  time and is requested to have orders in by that 
time.  Physician will ensure orders are in for pt to be discharged at 10am .

## 2020-03-27 VITALS — DIASTOLIC BLOOD PRESSURE: 63 MMHG | SYSTOLIC BLOOD PRESSURE: 148 MMHG

## 2020-03-27 VITALS — SYSTOLIC BLOOD PRESSURE: 222 MMHG | DIASTOLIC BLOOD PRESSURE: 97 MMHG

## 2020-03-27 VITALS — DIASTOLIC BLOOD PRESSURE: 95 MMHG | SYSTOLIC BLOOD PRESSURE: 179 MMHG

## 2020-03-27 LAB
APTT PPP: YELLOW S
BACTERIA #/AREA URNS HPF: NEGATIVE /HPF
BASOPHILS # BLD AUTO: 0 10^3/UL (ref 0–0.1)
BASOPHILS NFR BLD AUTO: 0 % (ref 0–10)
BILIRUB UR QL STRIP: NEGATIVE
EOSINOPHIL # BLD AUTO: 0.1 10^3/UL (ref 0–0.3)
EOSINOPHIL NFR BLD AUTO: 1 % (ref 0–10)
ERYTHROCYTE [DISTWIDTH] IN BLOOD BY AUTOMATED COUNT: 17.4 % (ref 10–14.5)
FIBRINOGEN PPP-MCNC: CLEAR MG/DL
GLUCOSE UR STRIP-MCNC: (no result) MG/DL
HCT VFR BLD CALC: 25 % (ref 35–52)
HGB BLD-MCNC: 7.9 G/DL (ref 11.5–16)
KETONES UR QL STRIP: NEGATIVE
LEUKOCYTE ESTERASE UR QL STRIP: NEGATIVE
LYMPHOCYTES # BLD AUTO: 0.9 X 10^3 (ref 1–4)
LYMPHOCYTES NFR BLD AUTO: 13 % (ref 12–44)
MANUAL DIFFERENTIAL PERFORMED BLD QL: NO
MCH RBC QN AUTO: 28 PG (ref 25–34)
MCHC RBC AUTO-ENTMCNC: 31 G/DL (ref 32–36)
MCV RBC AUTO: 88 FL (ref 80–99)
MONOCYTES # BLD AUTO: 0.5 X 10^3 (ref 0–1)
MONOCYTES NFR BLD AUTO: 7 % (ref 0–12)
NEUTROPHILS # BLD AUTO: 5.2 X 10^3 (ref 1.8–7.8)
NEUTROPHILS NFR BLD AUTO: 79 % (ref 42–75)
NITRITE UR QL STRIP: NEGATIVE
PH UR STRIP: 7.5 [PH] (ref 5–9)
PLATELET # BLD: 254 10^3/UL (ref 130–400)
PMV BLD AUTO: 10.1 FL (ref 7.4–10.4)
PROT UR QL STRIP: (no result)
RBC #/AREA URNS HPF: (no result) /HPF
SP GR UR STRIP: 1.01 (ref 1.02–1.02)
SQUAMOUS #/AREA URNS HPF: (no result) /HPF
WBC # BLD AUTO: 6.6 10^3/UL (ref 4.3–11)
WBC #/AREA URNS HPF: (no result) /HPF

## 2020-03-27 RX ADMIN — POTASSIUM CHLORIDE, DEXTROSE MONOHYDRATE AND SODIUM CHLORIDE SCH MLS/HR: 75; 5; 450 INJECTION, SOLUTION INTRAVENOUS at 11:52

## 2020-03-27 RX ADMIN — Medication SCH MG: at 08:17

## 2020-03-27 RX ADMIN — FLUCONAZOLE IN SODIUM CHLORIDE SCH MLS/HR: 2 INJECTION, SOLUTION INTRAVENOUS at 11:52

## 2020-03-27 RX ADMIN — POTASSIUM CHLORIDE, DEXTROSE MONOHYDRATE AND SODIUM CHLORIDE SCH MLS/HR: 75; 5; 450 INJECTION, SOLUTION INTRAVENOUS at 03:42

## 2020-03-27 RX ADMIN — ASPIRIN 81 MG CHEWABLE TABLET SCH MG: 81 TABLET CHEWABLE at 08:17

## 2020-03-27 RX ADMIN — ATENOLOL SCH MG: 50 TABLET ORAL at 08:17

## 2020-03-27 NOTE — DIAGNOSTIC IMAGING REPORT
INDICATION: Fever.



Comparison made with prior examination of 03/26/2020.



FINDINGS:  Heart size is normal. Lungs are clear. No pleural

effusion or pneumothorax. Mediastinum is unremarkable.



IMPRESSION: Stable chest x-ray



Dictated by: 



  Dictated on workstation # [id number]

## 2020-03-27 NOTE — DISCHARGE SUMMARY
Diagnosis/Chief Complaint


Date of Admission


Mar 18, 2020 at 10:55


Date of Discharge





Discharge Date:  Mar 27, 2020


Discharge Diagnosis


1.  Influenza B--done with Tx


2.  Severe Dehydration with Acute Renal Failure--improved


3.  Sepsis with Staph Aureus--likely Bacterial Endocarditis--5more weeks of IV 

antibiotics--will do Rocephin since she is allergic to PCN and the Staph is MSSA


4.  Scleroderma--chronic


5.  Hypotension--resolved


6.  Metabolic Acidosis with metabolic encephalopathy--improved


7.  Bilateral Cerebral Emboli with Ischemia--likely source is endocarditis as CT

angiogram is negative but  has decided on no BLANCHE, aspirin for now as 

hemoglobin dropped with addition of plavix


8.  Anxiety/Agitation--improved with citalopram


9.  Choking--ST adjusted to nectar consistency diet, ST at NH


10. Hypertension/Tachycardia--on atenolol


11. Hypokalemia--oral potassium


12. Hypomagnesemia--oral magnesium


13. Weakness and Debility--to SNF for OT, PT


14. Acute Anemia--ferrous sulfate and repeat CBC in 2 weeks





Reason Hospital Visit


1This is a 59 year old female with scleroderma who was brought to the emergency 

room with weakness and worsening shortness of breath.  She states she had been 

sick for about a week.  She was found to have Influenza B and to be severely 

dehydrated with a creatinine of 2.95 and was hypotensive.  It was decided to 

admit her to the ICU for IVFs and possible pressors if needed.  She was given 

tamiflu in the ER.





Discharge Summary


Hospital Course


Was the Problem List Reviewed?:  Yes


Hospital Course


This is a 59 year old female with scleroderma who was brought to the emergency 

room with weakness and worsening shortness of breath.  She states she had been 

sick for about a week.  She was found to have Influenza B and to be severely 

dehydrated with a creatinine of 2.95 and was hypotensive.  It was decided to 

admit her to the ICU for IVFs and possible pressors if needed.  She was given 

tamiflu in the ER.  She was admitted to the ICU and placed on sepsis protocol 

with IV maxipime as well as aggressive IVFs.  She required levaphed the first 

24hrs for BP support.  The second hospital day she was lethargic but appeared to

be in distress so hydrocortisone was added for adrenal support.  The 3rd 

hospital day, her labs were showing much improvement with her creatinine down to

1.63 and her electrolytes and acidosis were improving and she was awake but 

could not speak and could not track or follow commands.  A CT scan of the brain 

was ordered which showed an old lacunar infarct but was otherwise normal so a 

MRI of the brain was obtained.  This showed bilateral emboli with ischemia.  

This was discussed at length with both the  as well as over the phone 

with both daughters.  They were made aware of her poor prognosis and converted 

her to a DNR/DNI.  Her blood cultures had came back positive for MSSA so she was

continued on Maxipime and there was concern for bacterial endocarditis being the

source of her cerebral emboli due to no noted atrial fibrillation and no plaque 

noted in her carotid arteries on dopplers however the right carotid was limited 

due to an IJ in that area and we were unable to perform a CT angiogram due to 

her creatinine.  Cardiology was consulted and an ECHO was done--there was no 

obvious vegetation on her heart valves but she was too ill to do a BLANCHE.  It was 

decided to continue with aggressive treatment for at least another 24-48hrs to 

see if she was going to start responding or following any commands.  Vancomycin 

was added for bacterial endocarditis coverage as well.  She continued to be 

unresponsive and decline over the weekend so her  decided on comfort 

care.  All antibiotics, fluids, etc were stopped and she was placed on comfort 

care orders.  She remained on comfort care but overnight she started speaking 

but she was unable to move her body.  Arrangements were started for a hospice 

consult and admission to a NH.  The  was agreeable to continuing with the

hospice as the patient lives in chronic pain and debility from her scleroderma. 

However, once he talked with his daughters the family decided to rescind the 

comfort care orders and proceed with treatment again.  She was restarted on 

IVFs, maxipime and labs were reassessed.  By the time of discharge her BUN/Cr 

were back to normal and her WBC count was back to the normal range.  Speech 

therapy was ordered to do a swallow evaluation and it was decided on a soft 

diet.  However, she continued to choke with orals so speech therapy was called 

back and she was changed to nectar consistency and did better with this.  She 

was slowly starting to  her arms and legs so PT and OT were started and 

plans were made for skilled nursing placement.  She originally declined the CT 

angiogram of her neck but then agreed to the test which showed no stenosis in 

her carotid or vertebral arteries.  The  declined a BLANCHE due to her 

history of esophageal stricture from her scleroderma.  Her blood pressure and 

pulse were elevated and she was started on atenolol for both blood pressure and 

heart rate control.  She was started on both aspirin and plavix but her 

hemoglobin dropped so she will be discharged just on aspirin with a repeat CBC 

in 2 weeks.  She did require both IV and oral replacement of potassium and 

magnesium as well.  It was decided she could be switched to once daily IV 

rocephin and be discharged on this at the NH to continue for 5 weeks.  All plans

were in place for the patient to be discharged to the [facility]

on the morning of 3/27/20 but before I was able to finalize her discharge plans,

the nurse pulled her central line so the Midline/PICC line nurse was to come and

place a line before discharge as the patient has to have IV antibiotics for 5 

weeks.


Labs





Procedures


None.


Consultations


Dr. Rosetta Gillespie/Gibson





Discharge Physical Examination


Allergies:  


Coded Allergies:  


     Penicillins (Verified  Allergy, Unknown, 3/18/20)


Vitals & I&Os





Vital Signs








  Date Time  Temp Pulse Resp B/P (MAP) Pulse Ox O2 Delivery O2 Flow Rate FiO2


 


3/27/20 13:10 37.0 69 18 148/63 93 Room Air 2.00 








General Appearance:  Alert, Oriented X3, Cooperative, No Acute Distress


Respiratory:  Clear to Auscultation


Cardiovascular:  Regular Rate


Abdominal:  Normal Bowel Sounds, Soft, No Tenderness


Extremities:  Other (tenseness)


Skin:  Other (chronic sores to hands and feet)


Psych/Mental Status:  Mental Status NL, Mood NL





Discharge


Home Medications


Reviewed and agree with Discharge Medication list on patient's Discharge 

Instruction sheet


Instructions to Patient/Family


Please see electronic discharge instructions given to patient.





Clinical Quality Measures


DVT/VTE Risk/Contraindication:


Risk Factor Score Per Nursin


RFS Level Per Nursing on Admit:  4+=Very High











YENY DINH DO        Mar 27, 2020 12:28

## 2020-03-27 NOTE — NUR
CM/SS:  Discharge summary/information  faxed to [facility]

## 2020-03-27 NOTE — NUR
report called to [facility] nurse. patient will need midline prior to DC. day surg notified and 
will come insert when available

## 2020-03-27 NOTE — DIAGNOSTIC IMAGING REPORT
INDICATION: Central venous catheter evaluation.



Since examination of earlier in the day, there has been

discontinuation of right jugular central venous catheter and

placement of right upper extremity PICC with catheter tip

reaching the lower superior vena cava. There is no evidence of

pneumothorax. No definite pleural fluid is identified. Otherwise,

no adverse change is identified.



IMPRESSION: No evidence of complication post central venous

catheter exchange as described.



Dictated by: 



  Dictated on workstation # [id number]

## 2020-03-27 NOTE — PHYSICAL THERAPY DAILY NOTE
PT Daily Note-Current


Subjective


Patient in bed pre tx, agrees to PT, has unrated pain, unsure of where, 

patient's speech is very hard to understand.





Appearance


Patient in recliner post tx with nurse call, phone, tray, all needs met, patient

instructed to call nursing when she needs to get back to bed, susanne sling under 

patient .





Mental Status


Patient Orientation:  Person, Unable to Assess, Mumbles


Attachments:  Cespedes Catheter, IV





Transfers


SCALE: Activities may be completed with or without assistive devices.





6-Indepedent-patient completes the activity by him/herself with no assistance 

from a helper.


5-Set-up or Clean-up Assistance-helper sets up or cleans up; patient completes 

activity. [place] assists only prior to or  


    following the activity.


4-Supervision or Touching Assistance-helper provides verbal cues and/or 

touching/steadying and/or contact guard assistance as patient completes 

activity. Assistance may be provided   


    throughout the activity or intermittently.


3-Partial/Moderate Assistance-helper does LESS THAN HALF the effort. [place]on 

lifts, holds or supports trunk or limbs, but provides less than half the effort.


2-Substantial/Maximal Assistance-helper does MORE THAN HALF the effort. [place] 

lifts or holds trunk or limbs and provides more than half the effort.


7-Jpwtpzajv-yoobjk does ALL the effort. Patient does none of the effort to 

complete the activity. Or, the assistance of 2 or more helpers is required for 

the patient to complete the  


    activity.


If activity was not attempted, code reason:


7-Patient Refused.


9-Not Applicable-not attempted and the patient did not perform the activity 

before the current illness, exacerbation or injury.


10-Not Attempted due to Environmental Limitations-(lack of equipment, weather 

restraints, etc.).


88-Not Attempted due to Medical Conditions or Safety Concerns.


Roll Left & Right (QC):  1


Lying to Sitting/Side of Bed(Q:  1


Sit to Stand (QC):  1


Chair/Bed-to-Chair Xfer(QC):  1


Patient had a BM and was cleaned by PT tech during the transfer, patient did not

seem to be able to assist standing during the transfer.





Weight Bearing


Right Lower Extremity:  Right


Full Weight Bearing


Left Lower Extremity:  Left


Full Weight Bearing





Exercises


Seated Therapy Exercises:  Long arc quads


Seated Reps:  20





Treatments


toileting, bed mobility and transfer, LE exercise





Assessment


Current Status:  Poor Progress


dependent for stand pivot transfer





PT Short Term Goals


Short Term Goals


Time Frame:  Mar 31, 2020


Roll Left & Right:  3


Sit to lying:  3


Lying to sitting on side of be:  3


Chair/bed-to-chair transfer:  3


Toilet transfer:  3


Wheel 50ft w/2 turns:  2





PT Plan


Problem List


Problem List:  Activity Tolerance, Functional Strength, Safety, Balance, Gait, 

Transfer, Bed Mobility, ROM





Treatment/Plan


Treatment Plan:  Continue Plan of Care


Treatment Plan:  Bed Mobility, Education, Functional Activity Hillary, Functional 

Strength, Gait, Safety, Therapeutic Exercise, Transfers


Treatment Duration:  Mar 31, 2020


Frequency:  6 times per week


Estimated Hrs Per Day:  .25 hour per day


Patient and/or Family Agrees t:  Yes





Safety Risks/Education


Patient Education:  Transfer Techniques, Correct Positioning, Safety Issues


Teaching Recipient:  Patient


Teaching Methods:  Demonstration, Discussion


Response to Teaching:  Reinforcement Needed





Time/GCodes


Time In:  0831


Time Out:  0847


Total Billed Treatment Time:  16


Total Billed Treatment


1 visit


FA MAYELIN CRESPO PT                Mar 27, 2020 08:53

## 2020-03-27 NOTE — NUR
Notified Dr. Garcia that pt blood pressure was 222/79mmhg and her temperature was 37.9. 
Received order for Clonidine 0.1mg PO once; Tylenol 650 PO q6h PRN for fever; Chest X-ray; 
Urinalysis; and CBC. Will follow orders.

## 2024-09-29 NOTE — PROGRESS NOTE - CARDIOLOGY
Cardiology SOAP Progress Note


Subjective:


Pt aphasic and non-communicative





Objective:


I&O/Vital Signs











 3/20/20 3/20/20 3/20/20 3/21/20





 22:53 23:00 23:59 00:00


 


Pulse  112  116


 


Resp  12  14


 


B/P (MAP)  148/55 (86)  129/71 (90)


 


Pulse Ox 93 93  93


 


O2 Delivery Room Air Room Air Room Air Room Air





 3/21/20 3/21/20 3/21/20 3/21/20





 00:10 01:00 01:00 02:00


 


Temp 37.0   


 


Pulse  112 112 107


 


Resp  12  12


 


B/P (MAP)  134/61 (85)  125/57 (79)


 


Pulse Ox  92  94


 


O2 Delivery  Room Air  Room Air


 


    





 3/21/20 3/21/20 3/21/20 3/21/20





 02:07 03:00 04:00 04:21


 


Temp    36.6


 


Pulse  114 114 


 


Resp  12 13 


 


B/P (MAP)  143/71 (95) 150/75 (100) 


 


Pulse Ox 93 94 93 


 


O2 Delivery Room Air Room Air Room Air 


 


    





 3/21/20 3/21/20 3/21/20 3/21/20





 04:36 05:00 06:00 06:37


 


Pulse  108 113 


 


Resp  12 12 


 


B/P (MAP)  148/67 (94) 161/72 (101) 


 


Pulse Ox  93 92 92


 


O2 Delivery Room Air Room Air Room Air Room Air





 3/21/20 3/21/20 3/21/20 3/21/20





 07:00 07:00 08:00 08:00


 


Pulse 122 116 120 


 


Resp  7 10 


 


B/P (MAP)  155/76 (102) 162/76 (104) 


 


Pulse Ox  91 92 


 


O2 Delivery  Room Air Room Air Room Air





 3/21/20   





 09:00   


 


Pulse 117   


 


Resp 10   


 


B/P (MAP)    


 


Pulse Ox 92   


 


O2 Delivery Room Air   














 3/21/20





 00:00


 


Intake Total 1510 ml


 


Output Total 450 ml


 


Balance 1060 ml








Constitutional:  other (cannot determine orientation, pt not verbally 

responsive, well developed, well nourished)


Respiratory:  No accessory muscle use; other (good bilat air entry)


Cardiovascular:  regular rate-rhythm, S1 and S2, systolic murmur (soft LANA at 

card base)


Gastrointestional:  No tender; soft; No guarding, No rebound; audible bowel 

sounds


Extremities:  No clubbing, No cyanosis, No significant edema


Neurologic/Psychiatric:  other (aphasic and essentially unresponsive, cannot 

cooperate with a neuro exam)


Skin:  warm/dry, other (scleroderma skin changes noted to face and extremities)





Results/Procedures:


Labs


Laboratory Tests


3/20/20 16:18: Ammonia 21


3/21/20 03:05: 


White Blood Count 9.4, Red Blood Count 3.43L, Hemoglobin 9.6L, Hematocrit 30L, 

Mean Corpuscular Volume 87, Mean Corpuscular Hemoglobin 28, Mean Corpuscular 

Hemoglobin Concent 32, Red Cell Distribution Width 18.0H, Platelet Count 139, 

Mean Platelet Volume 10.5H, Neutrophils (%) (Auto) 83H, Lymphocytes (%) (Auto) 

12, Monocytes (%) (Auto) 4, Eosinophils (%) (Auto) 0, Basophils (%) (Auto) 0, 

Neutrophils # (Auto) 7.8, Lymphocytes # (Auto) 1.2, Monocytes # (Auto) 0.4, 

Eosinophils # (Auto) 0.0, Basophils # (Auto) 0.0, Sodium Level 137, Potassium 

Level 4.0, Chloride Level 107, Carbon Dioxide Level 17L, Anion Gap 13, Blood 

Urea Nitrogen 52H, Creatinine 1.23, Estimat Glomerular Filtration Rate 45, 

BUN/Creatinine Ratio 42, Glucose Level 127H, Calcium Level 8.1L, Corrected 

Calcium 9.5, Phosphorus Level 3.7, Magnesium Level 2.1, Total Bilirubin 1.1H, 

Aspartate Amino Transf (AST/SGOT) 228H, Alanine Aminotransferase (ALT/SGPT) 139H

, Alkaline Phosphatase 268H, Total Protein 5.9L, Albumin 2.3L





Microbiology


3/18/20 Gram Stain - Final, Resulted


          


3/18/20 Sputum Culture - Preliminary, Resulted


          Usual upper respiratory candice


          Moraxella catarrhalis


          Staphylococcus aureus


3/18/20 Urine Culture - Final, Complete


          NO GROWTH


3/18/20 Blood Culture - Preliminary, Resulted


          No growth








A/P:


Assessment:





Influenza B





Severe sepsis, presentation with septic shock





Mental status changes: sepsis vs stroke





Recent vs ac CVA. MRI on 3/20/20: Small punctate foci of acute ischemia 

involving the left corona radiata and right parietal lobe. No mass effect or 

hemorrhage. Given the bilateral distribution and punctate size, there is concern

for embolic source.





Echo on 3/20/30 (Dr Gillespie): LVEF 80-85%, grade 1 mcgraw dysfunction, PASP 55-60 

mmHg. No evidence of endocarditis or intracardiac thrombus on TTE of 3/20/20





Liver enzyme elevation of undetermined etiology, worsening





Anemia of undetermined etiology, worsening (may be dilutional due to iv fluid 

admin)





Acute renal failure, likely ATN due to septic shock, improving





Metabolic acidosis, likely due to ac renal failure, unchanged





Scleroderma, treated chronically with steroids





No significant disease of either carotid bifurcation per carotid u/s of 3/20/20


Plan:





* I reviewed her records and examined the patient. Management is complex and 

  prognosis is guarded, given multiple, concomitant medical problems


* Continue iv fluids and add bicarb to address acidosis


* Currently exhibiting htn and sinus tach. Beta-blockers appear reasonable, if 

  the Med Svce does not wish to keep bp high to treat stroke


* Given evidence of embolic stroke, occult A Fib is a consideration. Appears 

  reasonable to treat with stroke-prophylaxis dose of enoxaparin


* Management of stroke, sepsis, anemia and renal & hepatic failure/injury is 

  with the Med Svce


* Monitor labs closely











JEANNIE REYNA MD FACP FACC CCDS   Mar 21, 2020 10:18 Negative